# Patient Record
Sex: MALE | Race: WHITE | NOT HISPANIC OR LATINO | Employment: FULL TIME | URBAN - METROPOLITAN AREA
[De-identification: names, ages, dates, MRNs, and addresses within clinical notes are randomized per-mention and may not be internally consistent; named-entity substitution may affect disease eponyms.]

---

## 2017-01-10 ENCOUNTER — OFFICE VISIT (OUTPATIENT)
Dept: ORTHOPEDICS | Facility: CLINIC | Age: 47
End: 2017-01-10
Payer: COMMERCIAL

## 2017-01-10 VITALS
HEIGHT: 71 IN | SYSTOLIC BLOOD PRESSURE: 125 MMHG | BODY MASS INDEX: 35 KG/M2 | WEIGHT: 250 LBS | DIASTOLIC BLOOD PRESSURE: 85 MMHG

## 2017-01-10 DIAGNOSIS — M22.2X9 PATELLOFEMORAL PAIN SYNDROME, UNSPECIFIED LATERALITY: Primary | ICD-10-CM

## 2017-01-10 PROCEDURE — 99203 OFFICE O/P NEW LOW 30 MIN: CPT | Performed by: FAMILY MEDICINE

## 2017-01-10 NOTE — PROGRESS NOTES
"Alen Gibbs  :  1970  DOS: 1/10/2017  MRN: [id number]    Sports Medicine Clinic Visit    PCP: Nito Pruitt    Alen Gibbs is a 46 year old male who is seen as ER referral presenting with right knee pain.    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  Pain improved over last 1 week after resting from work.  Pain located over right medial, anterior kne, nonradiating.  Additional Features:  Positive: swelling.  Symptoms are better with Rest.  Symptoms are worse with: prolonged walking, kneeling.  Other evaluation and/or treatments so far consists of: Ice, Ibuprofen and Rest.  Recent imaging completed: X-rays completed 16.  Prior History of related problems: none    Social History: works as [employer]    Review of Systems  Musculoskeletal: as above  Remainder of review of systems is negative including constitutional, CV, pulmonary, GI, Skin and Neurologic except as noted in HPI or medical history.    Past Medical History   Diagnosis Date     Hyperlipidemia      Syncope and collapse      Past Surgical History   Procedure Laterality Date     Bone marrow aspiration only       Arthrotomy wrist Right 6/10/2016     Procedure: ARTHROTOMY WRIST;  Surgeon: Toni Ewing MD;  Location: [facility] OR       Objective  /85 mmHg  Ht 5' 11\" (1.803 m)  Wt 250 lb (113.399 kg)  BMI 34.88 kg/m2    General: healthy, alert and in no distress    HEENT: no scleral icterus or conjunctival erythema   Skin: no suspicious lesions or rash. No jaundice.   CV: regular rhythm by palpation, 2+ distal pulses, no pedal edema    Resp: normal respiratory effort without conversational dyspnea   Psych: normal mood and affect    Gait: nonantalgic, appropriate coordination and balance   Neuro: normal light touch sensory exam of the extremities. Motor strength as noted below     Right Knee exam    ROM:        Flexion 140 " degrees       Extension 0 degrees       Range of motion limited by minimal pain in terminal flexion    Inspection:       no visible ecchymosis       no effusion noted     Skin:       no visible deformities       well perfused       capillary refill brisk    Patellar Motion:        Crepitus noted in the patellofemoral joint, painless    Tender:        lateral patellar border minimal        medial joint line minimal    Non Tender:         remainder of knee area        medial patellar border        lateral joint line        along MCL        distal IT Band        infrapatellar tendon        tibial tubercle       pes anserine bursa    Special Tests:        neg (-) Chip       neg (-) Lachmans       neg (-) anterior drawer       neg (-) posterior drawer       neg (-) pivot shift       neg (-) varus at 0 deg and 30 deg       neg (-) valgus at 0 deg and 30 deg    Evaluation of ipsilateral kinetic chain       normal strength with hip extension and abduction, but somewhat deconditioned b/l       Decreased strength with single leg squat b/l, R>L        Radiology  RIGHT KNEE ONE TO TWO VIEWS  12/31/2016 9:04 PM       HISTORY:  Two weeks of atraumatic pain.     COMPARISON: None.     FINDINGS: Mild loss of joint space in the medial compartment. Mild  dorsal patellar spurring. No suprapatellar effusion. There is no acute  fracture. No dislocation.  There are no worrisome bony lesions.                                                                       IMPRESSION:  No acute osseous abnormality demonstrated.      Assessment:  1. Patellofemoral pain syndrome, unspecified laterality        Plan:  Discussed the assessment with the patient.  Follow up: 6-8 wks prn  Offered PT, signs of PFS, can order any time prn  cannot r/o very mild degenerative change, but this was a flare due to overuse  Apply HEP to both legs  Discussed how to work on hip and knee stabilization reviewed, training principles reviewed  Use of short 1-2 wk course  of NSAIDs reviewed, dosing and precautions reviewed if utilized  Otherwise try to use tylenol prn  RICE reviewed  Painfree activity ok, limit squatting, lunging, jumping, stairs  We discussed modified progressive pain-free activity as tolerated  Home handouts provided and supportive care reviewed  All questions were answered today  Contact us with additional questions or concerns  Signs and sx of concern reviewed      Asad Godfrey DO, MARYAM  Primary Care Sports Medicine  [facility]           Disclaimer: This note consists of symbols derived from keyboarding, dictation and/or voice recognition software. As a result, there may be errors in the script that have gone undetected. Please consider this when interpreting information found in this chart.

## 2017-01-10 NOTE — NURSING NOTE
"Initial /85 mmHg  Ht 5' 11\" (1.803 m)  Wt 250 lb (113.399 kg)  BMI 34.88 kg/m2 Estimated body mass index is 34.88 kg/(m^2) as calculated from the following:    Height as of this encounter: 5' 11\" (1.803 m).    Weight as of this encounter: 250 lb (113.399 kg). .    Marcelo Payan ATC  "

## 2017-02-05 ENCOUNTER — HOSPITAL ENCOUNTER (EMERGENCY)
Facility: CLINIC | Age: 47
Discharge: HOME | End: 2017-02-05
Attending: NURSE PRACTITIONER | Admitting: NURSE PRACTITIONER
Payer: COMMERCIAL

## 2017-02-05 ENCOUNTER — APPOINTMENT (OUTPATIENT)
Dept: GENERAL RADIOLOGY | Facility: CLINIC | Age: 47
End: 2017-02-05
Attending: NURSE PRACTITIONER
Payer: COMMERCIAL

## 2017-02-05 VITALS — OXYGEN SATURATION: 98 % | SYSTOLIC BLOOD PRESSURE: 136 MMHG | TEMPERATURE: 97.7 F | DIASTOLIC BLOOD PRESSURE: 76 MMHG

## 2017-02-05 DIAGNOSIS — L02.619 CELLULITIS AND ABSCESS OF FOOT EXCLUDING TOE: ICD-10-CM

## 2017-02-05 DIAGNOSIS — L03.119 CELLULITIS AND ABSCESS OF FOOT EXCLUDING TOE: ICD-10-CM

## 2017-02-05 PROCEDURE — 40000940 XR FOOT RT G/E 3 VW: Mod: RT

## 2017-02-05 PROCEDURE — 99213 OFFICE O/P EST LOW 20 MIN: CPT

## 2017-02-05 PROCEDURE — 99214 OFFICE O/P EST MOD 30 MIN: CPT | Performed by: NURSE PRACTITIONER

## 2017-02-05 RX ORDER — CEPHALEXIN 500 MG/1
500 CAPSULE ORAL 2 TIMES DAILY
Qty: 20 CAPSULE | Refills: 0 | Status: SHIPPED | OUTPATIENT
Start: 2017-02-05 | End: 2017-02-07

## 2017-02-05 ASSESSMENT — ENCOUNTER SYMPTOMS
JOINT SWELLING: 1
RESPIRATORY NEGATIVE: 1
WOUND: 0
GASTROINTESTINAL NEGATIVE: 1
ENDOCRINE NEGATIVE: 1
EYES NEGATIVE: 1
NEUROLOGICAL NEGATIVE: 1

## 2017-02-05 NOTE — ED AVS SNAPSHOT
[facility] Emergency Department    [address] [address]    Phone:  [phone]    Fax:  [phone]                                       Alen Gibbs   MRN: [id number]    Department:  [facility] Emergency Department   Date of Visit:  2/5/2017           Patient Information     Date Of Birth          1970        Your diagnoses for this visit were:     Cellulitis and abscess of foot excluding toe        You were seen by Yoshi Delatorre, MACY CNP.      Follow-up Information     Follow up with Nito Pruitt MD.    Specialty:  Family Practice    Why:  As needed, If symptoms worsen    Contact information:    [facility]  [address] [address]  [phone]          Discharge Instructions         Discharge Instructions for Cellulitis  You have been diagnosed with cellulitis. This is an infection in the deepest layer of the skin. In some cases, the infection also affects the muscle. Cellulitis is caused by bacteria. The bacteria can enter the body through broken skin. This can happen with a cut, scratch, animal bite, or an insect bite that has been scratched. You may have been treated in the hospital with antibiotics and fluids. You will likely be given a prescription for antibiotics to take at home. This sheet will help you take care of yourself at home.  Home Care  When you are home:    Take the prescribed antibiotic medication you are given as directed until it is gone. Take it even if you feel better. It treats the infection and stops it from returning. Not taking all of the medication can make future infections hard to treat.    Keep the infected area clean.    When possible, raise the infected area above the level of your heart. This helps keep swelling down.    Talk to your doctor if you are in pain. Ask what kind of over-the-counter medication you can take for pain.    Apply clean bandages as advised.    Take your temperature  once a day for a week.    Wash your hands often to prevent spreading the infection.  In the future, wash your hands before and after you touch cuts, scratches, or bandages. This will help prevent infection.   When to Call Your Doctor  Call your doctor immediately if you have any of the following:    Vomiting    Fever of100.4 F (38 C) or higher, or as directed by your health care provider    Shaking chills    Redness that gets worse in or around the infected area    Swelling of the infected area    Pain that gets worse in or around the infected area    Difficulty or pain when moving the joints above or below the infected area    Discharge or pus draining from the area     9021-0934 [employer]. [address] [place] [address]. All rights reserved. This information is not intended as a substitute for professional medical care. Always follow your healthcare professional's instructions.          24 Hour Appointment Hotline       To make an appointment at any [facility], call [phone] ([phone]). If you don't have a family doctor or clinic, we will help you find one. [facility] clinics are conveniently located to serve the needs of you and your family.             Review of your medicines      START taking        Dose / Directions Last dose taken    cephALEXin 500 MG capsule   Commonly known as:  KEFLEX   Dose:  500 mg   Quantity:  20 capsule        Take 1 capsule (500 mg) by mouth 2 times daily   Refills:  0          Our records show that you are taking the medicines listed below. If these are incorrect, please call your family doctor or clinic.        Dose / Directions Last dose taken    fluticasone 50 MCG/ACT spray   Commonly known as:  FLONASE   Dose:  2 spray   Quantity:  16 g        Spray 2 sprays into both nostrils daily   Refills:  11        hydrOXYzine 25 MG tablet   Commonly known as:  ATARAX   Dose:  25 mg   Quantity:  30 tablet        Take 1 tablet (25 mg) by mouth every  6 hours as needed for itching (and nausea)   Refills:  0        lisinopril 10 MG tablet   Commonly known as:  PRINIVIL/ZESTRIL   Dose:  10 mg   Quantity:  90 tablet        Take 1 tablet (10 mg) by mouth daily   Refills:  3        vitamin D 2000 UNITS tablet   Dose:  1 tablet        Take 1 tablet by mouth daily.   Refills:  0                Prescriptions were sent or printed at these locations (1 Prescription)                   [facility] [place], [place] - [address]   [address] [address]    Telephone:  [phone]   Fax:  [phone]   Hours:                  E-Prescribed (1 of 1)         cephALEXin (KEFLEX) 500 MG capsule                Procedures and tests performed during your visit     Foot  XR, G/E 3 views, right      Orders Needing Specimen Collection     None      Pending Results     Date and Time Order Name Status Description    2/5/2017 1919 Foot  XR, G/E 3 views, right Preliminary             Pending Culture Results     No orders found from 2/4/2017 to 2/6/2017.       Test Results from your hospital stay           2/5/2017  7:39 PM - Interface, Radiant Ib      Narrative     RIGHT FOOT THREE OR MORE VIEWS 2/5/2017 7:29 PM     COMPARISON: None.    HISTORY: Pain.    FINDINGS: There is a plantar calcaneal spur. The visualized bones and  joint spaces are within normal limits.        Impression     IMPRESSION: No evidence for fracture, dislocation or significant  degenerative change of the right foot.                 Thank you for choosing [facility]       Thank you for choosing [facility] for your care. Our goal is always to provide you with excellent care. Hearing back from our patients is one way we can continue to improve our services. Please take a few minutes to complete the written survey that you may receive in the mail after you visit with us. Thank you!        [employer] Information     [employer] lets you send messages to your doctor, view your test results, renew your  "prescriptions, schedule appointments and more. To sign up, go to [email or web address] . Click on \"Log in\" on the left side of the screen, which will take you to the Welcome page. Then click on \"Sign up Now\" on the right side of the page.     You will be asked to enter the access code listed below, as well as some personal information. Please follow the directions to create your username and password.     Your access code is: [email or web address]  Expires: 3/31/2017  9:49 PM     Your access code will  in 90 days. If you need help or a new code, please call your [facility] clinic or [phone].        Care EveryWhere ID     This is your Care EveryWhere ID. This could be used by other organizations to access your [facility] medical records  [id number]        After Visit Summary       This is your record. Keep this with you and show to your community pharmacist(s) and doctor(s) at your next visit.                  "

## 2017-02-05 NOTE — ED AVS SNAPSHOT
[facility] Emergency Department    [address] [address]    Phone:  [phone]    Fax:  [phone]                                       Alen Gibbs   MRN: [id number]    Department:  [facility] Emergency Department   Date of Visit:  2/5/2017           After Visit Summary Signature Page     I have received my discharge instructions, and my questions have been answered. I have discussed any challenges I see with this plan with the nurse or doctor.    ..........................................................................................................................................  Patient/Patient Representative Signature      ..........................................................................................................................................  Patient Representative Print Name and Relationship to Patient    ..................................................               ................................................  Date                                            Time    ..........................................................................................................................................  Reviewed by Signature/Title    ...................................................              ..............................................  Date                                                            Time

## 2017-02-06 NOTE — ED PROVIDER NOTES
History     Chief Complaint   Patient presents with     Foot Pain     x 2 wks      HPI  Alen Gibbs is a 46 year old male who presents with 2 weeks of gradual increase in foot pain in the right foot. He does not recall injuring the foot/ankle but has noticed worsening pain, redness and swelling over the dorsal 5th metacarpal/cuboidal aspect of the foot. He does not recall an insect bite. He does have a history of plantar fasciitis. He denies fever.     I have reviewed the Medications, Allergies, Past Medical and Surgical History, and Social History in the Epic system.    Review of Systems   HENT: Negative.    Eyes: Negative.    Respiratory: Negative.    Gastrointestinal: Negative.    Endocrine: Negative.    Genitourinary: Negative.    Musculoskeletal: Positive for joint swelling (top of foot) and gait problem.   Skin: Negative for rash (mild athletes foot to sole of right foot) and wound.   Neurological: Negative.    All other systems reviewed and are negative.      Physical Exam   BP: 136/76 mmHg  Heart Rate: 110  Temp: 97.7  F (36.5  C)  SpO2: 98 %  Physical Exam   Constitutional: He is oriented to person, place, and time. He appears well-developed and well-nourished. No distress.   Eyes: Conjunctivae and EOM are normal. Right eye exhibits no discharge. Left eye exhibits no discharge.   Pulmonary/Chest: Effort normal.   Musculoskeletal: He exhibits edema (mild edema to dorsal aspect of foot over the 5th metatarsal/3rd cuboid) and tenderness (tender to same area).   Neurological: He is alert and oriented to person, place, and time. No cranial nerve deficit.   Skin: Skin is warm. Rash (mild athletes foot to sole of foot) noted.       ED Course   Procedures             Labs Ordered and Resulted from Time of ED Arrival Up to the Time of Departure from the ED - No data to display    Assessments & Plan (with Medical Decision Making)   Likely cellulitis, possibly gout but this seems less likely    I have reviewed  the nursing notes.    I have reviewed the findings, diagnosis, plan and need for follow up with the patient.    Discharge Medication List as of 2/5/2017  7:49 PM      START taking these medications    Details   cephALEXin (KEFLEX) 500 MG capsule Take 1 capsule (500 mg) by mouth 2 times daily, Disp-20 capsule, R-0, E-Prescribe             Final diagnoses:   Cellulitis and abscess of foot excluding toe       2/5/2017   [facility] EMERGENCY DEPARTMENT      Yoshi Delatorre, MACY CNP  02/07/17 0741

## 2017-02-06 NOTE — DISCHARGE INSTRUCTIONS
Discharge Instructions for Cellulitis  You have been diagnosed with cellulitis. This is an infection in the deepest layer of the skin. In some cases, the infection also affects the muscle. Cellulitis is caused by bacteria. The bacteria can enter the body through broken skin. This can happen with a cut, scratch, animal bite, or an insect bite that has been scratched. You may have been treated in the hospital with antibiotics and fluids. You will likely be given a prescription for antibiotics to take at home. This sheet will help you take care of yourself at home.  Home Care  When you are home:    Take the prescribed antibiotic medication you are given as directed until it is gone. Take it even if you feel better. It treats the infection and stops it from returning. Not taking all of the medication can make future infections hard to treat.    Keep the infected area clean.    When possible, raise the infected area above the level of your heart. This helps keep swelling down.    Talk to your doctor if you are in pain. Ask what kind of over-the-counter medication you can take for pain.    Apply clean bandages as advised.    Take your temperature once a day for a week.    Wash your hands often to prevent spreading the infection.  In the future, wash your hands before and after you touch cuts, scratches, or bandages. This will help prevent infection.   When to Call Your Doctor  Call your doctor immediately if you have any of the following:    Vomiting    Fever of100.4 F (38 C) or higher, or as directed by your health care provider    Shaking chills    Redness that gets worse in or around the infected area    Swelling of the infected area    Pain that gets worse in or around the infected area    Difficulty or pain when moving the joints above or below the infected area    Discharge or pus draining from the area     4645-4826 [employer]. [address] [place] [address], [place] [address]. All rights reserved. This  information is not intended as a substitute for professional medical care. Always follow your healthcare professional's instructions.

## 2017-02-07 ENCOUNTER — OFFICE VISIT (OUTPATIENT)
Dept: FAMILY MEDICINE | Facility: CLINIC | Age: 47
End: 2017-02-07
Payer: COMMERCIAL

## 2017-02-07 VITALS
WEIGHT: 275.6 LBS | HEIGHT: 71 IN | OXYGEN SATURATION: 96 % | DIASTOLIC BLOOD PRESSURE: 83 MMHG | TEMPERATURE: 97.3 F | BODY MASS INDEX: 38.58 KG/M2 | HEART RATE: 98 BPM | SYSTOLIC BLOOD PRESSURE: 128 MMHG

## 2017-02-07 DIAGNOSIS — M10.071 ACUTE IDIOPATHIC GOUT OF RIGHT FOOT: Primary | ICD-10-CM

## 2017-02-07 PROCEDURE — 99214 OFFICE O/P EST MOD 30 MIN: CPT | Performed by: FAMILY MEDICINE

## 2017-02-07 RX ORDER — OXYCODONE AND ACETAMINOPHEN 5; 325 MG/1; MG/1
1-2 TABLET ORAL EVERY 4 HOURS PRN
Qty: 20 TABLET | Refills: 0 | Status: SHIPPED | OUTPATIENT
Start: 2017-02-07 | End: 2017-02-22

## 2017-02-07 RX ORDER — COLCHICINE 0.6 MG/1
TABLET ORAL
Qty: 30 TABLET | Refills: 0 | Status: SHIPPED
Start: 2017-02-07 | End: 2017-05-10

## 2017-02-07 NOTE — PATIENT INSTRUCTIONS
Thank you for choosing [facility].  You may be receiving a survey in the mail from Kieran George regarding your visit today.  Please take a few minutes to complete and return the survey to let us know how we are doing.      If you have questions or concerns, please contact us via [email or web address] or you can contact your care team at [phone].    Our Clinic hours are:  Monday 6:40 am  to 7:00 pm  Tuesday -Friday 6:40 am to 5:00 pm    The [facility] outpatient lab hours are:  Monday - Friday 6:10 am to 4:45 pm  Saturdays 7:00 am to 11:00 am  Appointments are required, call [phone]    If you have clinical questions after hours or would like to schedule an appointment,  call the clinic at [phone].  Gout    Gout or is an inflammation of a joint due to a build-up of gout crystals in the joint fluid. This occurs when there is an excess of uric acid (a normal waste product) in the body. Uric acid builds up in the body when the kidneys are unable to filter enough of it from the blood. This may occur with age. It is also associated with kidney disease. Gout occurs more often in persons with obesity, diabetes, hypertension, or high levels of fats in the blood. It may be run in families. Gout tends to come and go. A flare up of gout is called an attack. Drinking alcohol or eating certain foods (such as shellfish or foods with additives such as high-fructose corn syrup) may increase uric acid levels in the blood and cause a gout attack.  During a gout attack, the affected joint may become a hot, red, swollen and painful. If you have had one attack of gout, you are likely to have another. An attack of gout can be treated with medicine. If these attacks become frequent, a daily medicine may be prescribed to help the kidneys remove uric acid from the body.  Home care  During a gout attack:    Rest painful joints. If gout affects the joints of your foot or leg, you may want to use crutches for the first few days to keep  from bearing weight on the affected joint.    When sitting or lying down, raise the painful joint to a level higher than your heart.    Apply an ice pack (ice cubes in a plastic bag wrapped in a thin towel) over the injured area for 20 minutes every 1-2 hours the first day for pain relief. Continue this 3-4 times a day for swelling and pain.    Avoid alcohol and foods listed below (see Preventing attacks) during a gout attack. Drink extra fluid to help flush the uric acid through your kidneys.    If you were prescribed a medication to treat gout, take it as your healthcare provider has instructed. Don't skip doses.    Take anti-inflammatory medicine as directed.     If pain medicines have been prescribed, take them exactly as directed.    Preventing attacks    Minimize or avoid alcohol use. Excess alcohol intake can cause a gout attack.    Limit these foods and beverages:    Organ meats, such as kidneys and liver    Certain seafoods (anchovies, sardines, shrimp, scallops, herring, mackerel)    Wild game, meat extracts and meat gravies    Foods and beverages sweetened with high-fructose corn syrup, such as sodas    Eat a healthy diet including low-fat and nonfat dairy, whole grains, and vegetables.    If you are overweight, talk to your healthcare provider about a weight reduction plan. Avoid fasting or extreme low calorie diets (less than 900 calories per day). This will increase uric acid levels in the body.    If you have diabetes or high blood pressure, work with your doctor to manage these conditions.    Protect the joint from injury. Trauma can trigger a gout attack.  Follow-up care  Follow up with your healthcare provider or as advised.   When to seek medical advice  Call your healthcare provider if you have any of the following:    Fever over 100.4 F (38. C) with worsening joint pain    Increasing redness around the joint    Pain developing in another joint    Repeated vomiting, abdominal pain, or blood in  the vomit or stool (black or red color)    9919-8521 [employer]. [address] [place] [address], [place] [address]. All rights reserved. This information is not intended as a substitute for professional medical care. Always follow your healthcare professional's instructions.

## 2017-02-07 NOTE — NURSING NOTE
"Chief Complaint   Patient presents with     ER F/U     patient was seen in ER on 2/5/17 with DX of cellulitis in right foot; states pain is getting worse       Initial /83 mmHg  Pulse 98  Temp(Src) 97.3  F (36.3  C) (Tympanic)  Ht 5' 11\" (1.803 m)  Wt 275 lb 9.6 oz (125.011 kg)  BMI 38.46 kg/m2  SpO2 96% Estimated body mass index is 38.46 kg/(m^2) as calculated from the following:    Height as of this encounter: 5' 11\" (1.803 m).    Weight as of this encounter: 275 lb 9.6 oz (125.011 kg).  Medication Reconciliation: complete  "

## 2017-02-07 NOTE — PROGRESS NOTES
"  SUBJECTIVE:                                                    Alen Gibbs is a 46 year old male who presents to clinic today for the following health issues:      ED/UC Followup:    Facility:  [facility]  Date of visit: 2/5/17  Reason for visit: swelling, redness, pain in right foot  Current Status: pain has gotten worse, redness   Started Keflex Sunday for cellulitis and has seen no improvement in the redness or pain.  Not worsening either.  Has history of right foot injury in the past now with degenerative changes.  Ten years ago had an episode of either gout or plantar fascitis and was treated for gout at that time with rapid improvement.  No other acute gout flares.    Problem list and histories reviewed & adjusted, as indicated.  Additional history: as documented    Problem list, Medication list, Allergies, and Medical/Social/Surgical histories reviewed in [facility] and updated as appropriate.    ROS:  C: NEGATIVE for fever, chills, change in weight  CV: NEGATIVE for chest pain, palpitations or peripheral edema  MUSCULOSKELETAL: no other muscle/joint swelling    OBJECTIVE:                                                    /83 mmHg  Pulse 98  Temp(Src) 97.3  F (36.3  C) (Tympanic)  Ht 5' 11\" (1.803 m)  Wt 275 lb 9.6 oz (125.011 kg)  BMI 38.46 kg/m2  SpO2 96%  Body mass index is 38.46 kg/(m^2).  GENERAL: healthy, alert and no distress  MS: right foot most tender and red point is at the base of the 5th metatarsal.  Some pink on the top of the foot and mild swelling there and in toes.  Moderate effusion of the ankle joint, but not red or hot with only mildly decrease Range of motion of ankle.    Diagnostic Test Results:  none      ASSESSMENT/PLAN:                                                        BMI:   Estimated body mass index is 38.46 kg/(m^2) as calculated from the following:    Height as of this encounter: 5' 11\" (1.803 m).    Weight as of this encounter: 275 lb 9.6 oz (125.011 kg). "   Weight management plan: Discussed healthy diet and exercise guidelines and patient will follow up in 12 months in clinic to re-evaluate.      Alen was seen today for er f/u.    Diagnoses and all orders for this visit:    Acute idiopathic gout of right foot: of 5th metatarsal base, significant swelling in the right ankle joint, but not red or hot there.  -Plan to treat for gout with colchicine and pain medication, continue aleve  -ice, rest, discussed hydration and foods to avoid  -ok to stop Keflex as does not seem like cellulitis  -if not improving then see sports medication/ortho for joint aspiration  --discussed risks, benefits, and side effects of this new medication. Patient understands and is in agreement.    -     oxyCODONE-acetaminophen (PERCOCET) 5-325 MG per tablet; Take 1-2 tablets by mouth every 4 hours as needed for moderate to severe pain maximum 6 tablet(s) per day  -     colchicine (COLCRYS) 0.6 MG tablet; 2 tabs at the onset of flare, then 1 tab every 2 hrs until pain is better or diarrhea occurs, up to 10 doses. Wait 3 days until taking again          Axel Rocha MD  [facility]

## 2017-02-07 NOTE — MR AVS SNAPSHOT
After Visit Summary   2/7/2017    Alen Gibbs    MRN: [id number]           Patient Information     Date Of Birth          1970        Visit Information        Provider Department      2/7/2017 9:20 AM Axel Rocha MD [facility]        Today's Diagnoses     Acute idiopathic gout of right foot    -  1       Care Instructions          Thank you for choosing [facility].  You may be receiving a survey in the mail from [facility] regarding your visit today.  Please take a few minutes to complete and return the survey to let us know how we are doing.      If you have questions or concerns, please contact us via i-Ne[employer]os or you can contact your care team at [phone].    Our Clinic hours are:  Monday 6:40 am  to 7:00 pm  Tuesday -Friday 6:40 am to 5:00 pm    The [place] outpatient lab hours are:  Monday - Friday 6:10 am to 4:45 pm  Saturdays 7:00 am to 11:00 am  Appointments are required, call [phone]    If you have clinical questions after hours or would like to schedule an appointment,  call the clinic at [phone].  Gout    Gout or is an inflammation of a joint due to a build-up of gout crystals in the joint fluid. This occurs when there is an excess of uric acid (a normal waste product) in the body. Uric acid builds up in the body when the kidneys are unable to filter enough of it from the blood. This may occur with age. It is also associated with kidney disease. Gout occurs more often in persons with obesity, diabetes, hypertension, or high levels of fats in the blood. It may be run in families. Gout tends to come and go. A flare up of gout is called an attack. Drinking alcohol or eating certain foods (such as shellfish or foods with additives such as high-fructose corn syrup) may increase uric acid levels in the blood and cause a gout attack.  During a gout attack, the affected joint may become a hot, red, swollen and painful. If you have had one attack of  gout, you are likely to have another. An attack of gout can be treated with medicine. If these attacks become frequent, a daily medicine may be prescribed to help the kidneys remove uric acid from the body.  Home care  During a gout attack:    Rest painful joints. If gout affects the joints of your foot or leg, you may want to use crutches for the first few days to keep from bearing weight on the affected joint.    When sitting or lying down, raise the painful joint to a level higher than your heart.    Apply an ice pack (ice cubes in a plastic bag wrapped in a thin towel) over the injured area for 20 minutes every 1-2 hours the first day for pain relief. Continue this 3-4 times a day for swelling and pain.    Avoid alcohol and foods listed below (see Preventing attacks) during a gout attack. Drink extra fluid to help flush the uric acid through your kidneys.    If you were prescribed a medication to treat gout, take it as your healthcare provider has instructed. Don't skip doses.    Take anti-inflammatory medicine as directed.     If pain medicines have been prescribed, take them exactly as directed.    Preventing attacks    Minimize or avoid alcohol use. Excess alcohol intake can cause a gout attack.    Limit these foods and beverages:    Organ meats, such as kidneys and liver    Certain seafoods (anchovies, sardines, shrimp, scallops, herring, mackerel)    Wild game, meat extracts and meat gravies    Foods and beverages sweetened with high-fructose corn syrup, such as sodas    Eat a healthy diet including low-fat and nonfat dairy, whole grains, and vegetables.    If you are overweight, talk to your healthcare provider about a weight reduction plan. Avoid fasting or extreme low calorie diets (less than 900 calories per day). This will increase uric acid levels in the body.    If you have diabetes or high blood pressure, work with your doctor to manage these conditions.    Protect the joint from injury. Trauma can  "trigger a gout attack.  Follow-up care  Follow up with your healthcare provider or as advised.   When to seek medical advice  Call your healthcare provider if you have any of the following:    Fever over 100.4 F (38. C) with worsening joint pain    Increasing redness around the joint    Pain developing in another joint    Repeated vomiting, abdominal pain, or blood in the vomit or stool (black or red color)    1379-6346 [employer]. [address] [place] [address], [place] [address]. All rights reserved. This information is not intended as a substitute for professional medical care. Always follow your healthcare professional's instructions.              Follow-ups after your visit        Who to contact     If you have questions or need follow up information about today's clinic visit or your schedule please contact [facility] directly at [phone].  Normal or non-critical lab and imaging results will be communicated to you by [employer], letter or phone within 4 business days after the clinic has received the results. If you do not hear from us within 7 days, please contact the clinic through [employer] or phone. If you have a critical or abnormal lab result, we will notify you by phone as soon as possible.  Submit refill requests through Box[employer] or call your pharmacy and they will forward the refill request to us. Please allow 3 business days for your refill to be completed.          Additional Information About Your Visit        Boxbe Information     [employer]be lets you send messages to your doctor, view your test results, renew your prescriptions, schedule appointments and more. To sign up, go to [email or web address] . Click on \"Log in\" on the left side of the screen, which will take you to the Welcome page. Then click on \"Sign up Now\" on the right side of the page.     You will be asked to enter the access code listed below, as well as some personal information. Please follow the directions to " "create your username and password.     Your access code is: [email or web address]  Expires: 3/31/2017  9:49 PM     Your access code will  in 90 days. If you need help or a new code, please call your [place] clinic or [phone].        Care EveryWhere ID     This is your Care EveryWhere ID. This could be used by other organizations to access your [place] medical records  [id number]        Your Vitals Were     Pulse Temperature Height BMI (Body Mass Index) Pulse Oximetry       98 97.3  F (36.3  C) (Tympanic) 5' 11\" (1.803 m) 38.46 kg/m2 96%        Blood Pressure from Last 3 Encounters:   17 128/83   17 136/76   01/10/17 125/85    Weight from Last 3 Encounters:   17 275 lb 9.6 oz (125.011 kg)   01/10/17 250 lb (113.399 kg)   06/10/16 264 lb (119.75 kg)              Today, you had the following     No orders found for display         Today's Medication Changes          These changes are accurate as of: 17  9:44 AM.  If you have any questions, ask your nurse or doctor.               Start taking these medicines.        Dose/Directions    colchicine 0.6 MG tablet   Commonly known as:  COLCRYS   Used for:  Acute idiopathic gout of right foot   Started by:  Axel Rocha MD        2 tabs at the onset of flare, then 1 tab every 2 hrs until pain is better or diarrhea occurs, up to 10 doses. Wait 3 days until taking again   Quantity:  30 tablet   Refills:  0       oxyCODONE-acetaminophen 5-325 MG per tablet   Commonly known as:  PERCOCET   Used for:  Acute idiopathic gout of right foot   Started by:  Axel Rocha MD        Dose:  1-2 tablet   Take 1-2 tablets by mouth every 4 hours as needed for moderate to severe pain maximum 6 tablet(s) per day   Quantity:  20 tablet   Refills:  0            Where to get your medicines      These medications were sent to [facility] [address]  [address] [address]     Phone:  [phone]    - " colchicine 0.6 MG tablet      Some of these will need a paper prescription and others can be bought over the counter.  Ask your nurse if you have questions.     Bring a paper prescription for each of these medications    - oxyCODONE-acetaminophen 5-325 MG per tablet             Primary Care Provider Office Phone # Fax #    Nito Pruitt [phone]       [facility] [address] [address]        Thank you!     Thank you for choosing [facility]  for your care. Our goal is always to provide you with excellent care. Hearing back from our patients is one way we can continue to improve our services. Please take a few minutes to complete the written survey that you may receive in the mail after your visit with us. Thank you!             Your Updated Medication List - Protect others around you: Learn how to safely use, store and throw away your medicines at www.disposemymeds.org.          This list is accurate as of: 2/7/17  9:44 AM.  Always use your most recent med list.                   Brand Name Dispense Instructions for use    cephALEXin 500 MG capsule    KEFLEX    20 capsule    Take 1 capsule (500 mg) by mouth 2 times daily       colchicine 0.6 MG tablet    COLCRYS    30 tablet    2 tabs at the onset of flare, then 1 tab every 2 hrs until pain is better or diarrhea occurs, up to 10 doses. Wait 3 days until taking again       lisinopril 10 MG tablet    PRINIVIL/ZESTRIL    90 tablet    Take 1 tablet (10 mg) by mouth daily       oxyCODONE-acetaminophen 5-325 MG per tablet    PERCOCET    20 tablet    Take 1-2 tablets by mouth every 4 hours as needed for moderate to severe pain maximum 6 tablet(s) per day       vitamin D 2000 UNITS tablet      Take 1 tablet by mouth daily.

## 2017-02-22 ENCOUNTER — HOSPITAL ENCOUNTER (EMERGENCY)
Facility: CLINIC | Age: 47
Discharge: HOME | End: 2017-02-22
Attending: PHYSICIAN ASSISTANT | Admitting: PHYSICIAN ASSISTANT
Payer: COMMERCIAL

## 2017-02-22 VITALS — OXYGEN SATURATION: 95 % | DIASTOLIC BLOOD PRESSURE: 87 MMHG | TEMPERATURE: 98.4 F | SYSTOLIC BLOOD PRESSURE: 152 MMHG

## 2017-02-22 DIAGNOSIS — J02.0 STREP THROAT: ICD-10-CM

## 2017-02-22 LAB
INTERNAL QC OK POCT: YES
S PYO AG THROAT QL IA.RAPID: POSITIVE

## 2017-02-22 PROCEDURE — 99214 OFFICE O/P EST MOD 30 MIN: CPT | Performed by: PHYSICIAN ASSISTANT

## 2017-02-22 PROCEDURE — 99213 OFFICE O/P EST LOW 20 MIN: CPT

## 2017-02-22 PROCEDURE — 87880 STREP A ASSAY W/OPTIC: CPT | Performed by: PHYSICIAN ASSISTANT

## 2017-02-22 NOTE — ED AVS SNAPSHOT
[facility] Emergency Department    [address] [address]    Phone:  [phone]    Fax:  [phone]                                       Alen Gibbs   MRN: [id number]    Department:  [facility] Emergency Department   Date of Visit:  2/22/2017           Patient Information     Date Of Birth          1970        Your diagnoses for this visit were:     Strep throat        You were seen by Kaylene Reynoso PA-C.      Follow-up Information     Follow up with Nito Pruitt MD In 3 days.    Specialty:  Family Practice    Why:  if no improvement or sooner if new or worsening symptoms    Contact information:    [facility]  [address] [id number] [phone]          Discharge Instructions         Pharyngitis: Strep (Confirmed)     You have had a positive test for strep throat. Strep throat is a contagious illness. It is spread by coughing, kissing or by touching others after touching your mouth or nose. Symptoms include throat pain which is worse with swallowing, aching all over, headache and fever. It is treated with antibiotic medication. This should help you start to feel better within 1-2 days.  Home care    Rest at home. Drink plenty of fluids to avoid dehydration.    No work or school for the first 2 days of taking the antibiotics. After this time, you will not be contagious. You can then return to school or work if you are feeling better.     The antibiotic medication must be taken for the full 10 days, even if you feel better. This is very important to ensure the infection is treated. It is also important to prevent drug-resistent organisms from developing. If you were given an antibiotic shot, no more antibiotics are needed.    You may use acetaminophen (Tylenol) or ibuprofen (Motrin, Advil) to control pain or fever, unless another medicine was prescribed for this. (NOTE: If you have chronic liver or kidney disease or ever had a  stomach ulcer or GI bleeding, talk with your doctor before using these medicines.)    Throat lozenges or sprays (such as Chloraseptic) help reduce pain. Gargling with warm salt water will also reduce throat pain. Dissolve 1/2 teaspoon of salt in 1 glass of warm water. This may be useful just before meals.     Soft foods are okay. Avoid salty or spicy foods.  Follow-up care  Follow up with your healthcare provider or our staff if you are not improving over the next week.  When to seek medical advice  Call your healthcare provider right away if any of these occur:    Fever as directed by your doctor     New or worsening ear pain, sinus pain, or headache    Painful lumps in the back of neck    Stiff neck    Lymph nodes are getting larger or becoming soft in the middle    Inability to swallow liquids, excessive drooling, or inability to open mouth wide due to throat pain    Signs of dehydration (very dark urine or no urine, sunken eyes, dizziness)    Trouble breathing or noisy breathing    Muffled voice    New rash    2417-5272 [employer]. [address] [place] [address]. All rights reserved. This information is not intended as a substitute for professional medical care. Always follow your healthcare professional's instructions.          24 Hour Appointment Hotline       To make an appointment at any [facility] clinic, call [phone] ([phone]). If you don't have a family doctor or clinic, we will help you find one. [facility] clinics are conveniently located to serve the needs of you and your family.             Review of your medicines      START taking        Dose / Directions Last dose taken    amoxicillin-clavulanate 875-125 MG per tablet   Commonly known as:  AUGMENTIN   Dose:  1 tablet   Quantity:  20 tablet        Take 1 tablet by mouth 2 times daily for 10 days   Refills:  0          Our records show that you are taking the medicines listed below. If these are incorrect, please  call your family doctor or clinic.        Dose / Directions Last dose taken    colchicine 0.6 MG tablet   Commonly known as:  COLCRYS   Quantity:  30 tablet        2 tabs at the onset of flare, then 1 tab every 2 hrs until pain is better or diarrhea occurs, up to 10 doses. Wait 3 days until taking again   Refills:  0        KEFLEX PO        Refills:  0        lisinopril 10 MG tablet   Commonly known as:  PRINIVIL/ZESTRIL   Dose:  10 mg   Quantity:  90 tablet        Take 1 tablet (10 mg) by mouth daily   Refills:  3        vitamin D 2000 UNITS tablet   Dose:  1 tablet        Take 1 tablet by mouth daily.   Refills:  0                Prescriptions were sent or printed at these locations (1 Prescription)                   [facility] [place], [place] - [address]   [address] [address]    Telephone:  [phone]   Fax:  [phone]   Hours:                  E-Prescribed (1 of 1)         amoxicillin-clavulanate (AUGMENTIN) 875-125 MG per tablet                Procedures and tests performed during your visit     Rapid strep group A screen POCT      Orders Needing Specimen Collection     None      Pending Results     No orders found from 2/20/2017 to 2/23/2017.            Pending Culture Results     No orders found from 2/20/2017 to 2/23/2017.             Test Results from your hospital stay     2/22/2017  6:49 PM - Jennifer Boudreaux LPN      Component Results     Component Value Ref Range & Units Status    Rapid Strep A Screen POSITIVE neg Final    Internal QC OK Yes  Final                Thank you for choosing [facility]       Thank you for choosing [facility] for your care. Our goal is always to provide you with excellent care. Hearing back from our patients is one way we can continue to improve our services. Please take a few minutes to complete the written survey that you may receive in the mail after you visit with us. Thank you!        [employer] Information     [employer] lets you send  "messages to your doctor, view your test results, renew your prescriptions, schedule appointments and more. To sign up, go to [email or web address] . Click on \"Log in\" on the left side of the screen, which will take you to the Welcome page. Then click on \"Sign up Now\" on the right side of the page.     You will be asked to enter the access code listed below, as well as some personal information. Please follow the directions to create your username and password.     Your access code is: [email or web address]  Expires: 3/31/2017  9:49 PM     Your access code will  in 90 days. If you need help or a new code, please call your [place] clinic or [phone].        Care EveryWhere ID     This is your Care EveryWhere ID. This could be used by other organizations to access your [facility] medical records  [id number]        After Visit Summary       This is your record. Keep this with you and show to your community pharmacist(s) and doctor(s) at your next visit.                  "

## 2017-02-22 NOTE — ED AVS SNAPSHOT
[facility] Emergency Department    [address] [address]    Phone:  [phone]    Fax:  [phone]                                       Alen Gibbs   MRN: [id number]    Department:  [facility] Emergency Department   Date of Visit:  2/22/2017           After Visit Summary Signature Page     I have received my discharge instructions, and my questions have been answered. I have discussed any challenges I see with this plan with the nurse or doctor.    ..........................................................................................................................................  Patient/Patient Representative Signature      ..........................................................................................................................................  Patient Representative Print Name and Relationship to Patient    ..................................................               ................................................  Date                                            Time    ..........................................................................................................................................  Reviewed by Signature/Title    ...................................................              ..............................................  Date                                                            Time

## 2017-02-22 NOTE — LETTER
[facility] EMERGENCY DEPARTMENT  [address] [address]  Phone: [phone]  Fax: [phone]    February 22, 2017        Alen Gibbs  [address] [facility] [address]          To whom it may concern:    RE: Alensrini Guzmanr    Mr. Gibbs was evaluated in the  for an illness 2/22/17.  He is contagious for the next 24 hours and should not participate in activity during that time period.      Please contact me for questions or concerns.      Sincerely,      Kaylene Reynoso PA-C

## 2017-02-23 NOTE — ED PROVIDER NOTES
History     Chief Complaint   Patient presents with     Pharyngitis     sore throat for several days and getting worse, pt on keflex for diff issue.     HPI  Alen Gibbs is a 46 year old male with a chief complaint of sore throat for the last 8 days.  He usually complains of nasal congestion, sinus pressure, cough, shortness of breath and wheezing.  He has also had chills, myalgias.  He denies any objective fever, nausea, vomiting, diarrhea or abdominal pain.  Incidentally he states he was on 24-hour history of Keflex last week due to concerns over possible foot infection, however his medication was discontinued after 24 hours when he was diagnosed with gout by another provider.  He has been attempting to treat with Mucinex D without significant improvement.  He denies any prior history of asthma, COPD, other bleeding related disorders however states he has been diagnosed with pneumonia previously.     Past Medical History   Diagnosis Date     Hyperlipidemia      Syncope and collapse      Current Outpatient Prescriptions   Medication Sig Dispense Refill     Cephalexin (KEFLEX PO)        colchicine (COLCRYS) 0.6 MG tablet 2 tabs at the onset of flare, then 1 tab every 2 hrs until pain is better or diarrhea occurs, up to 10 doses. Wait 3 days until taking again 30 tablet 0     lisinopril (PRINIVIL,ZESTRIL) 10 MG tablet Take 1 tablet (10 mg) by mouth daily 90 tablet 3     Cholecalciferol (VITAMIN D) 2000 UNIT tablet Take 1 tablet by mouth daily.       Social History   Substance Use Topics     Smoking status: Never Smoker     Smokeless tobacco: Never Used     Alcohol use Yes      Comment: occ     I have reviewed the Medications, Allergies, Past Medical and Surgical History, and Social History in the Epic system.    Review of Systems  CONSTITUTIONAL:POSITIVE  for chills and myalgias and NEGATIVE  for objective fever  INTEGUMENTARY/SKIN: NEGATIVE for worrisome rashes, moles or lesions  EYES: NEGATIVE for vision  changes or irritation  ENT/MOUTH: POSITIVE for sore throat, nasal congestion and sinus pressure NEGATIVE for ear pain   RESP:POSITIVE for cough, shortness of breath and wheezing  GI: NEGATIVE for abdominal pain, diarrhea, nausea and vomiting  Physical Exam   /87  Temp 98.4  F (36.9  C) (Oral)  SpO2 95%  Physical Exam  GENERAL APPEARANCE: healthy, alert and no distress  EYES: EOMI,  PERRL, conjunctiva clear  HENT: ear canals and TM's normal.  Nasal mucosa edema.  There is some dried blood present in the left nares.  Pharynx erythematous, edematous without exudate, uvula midline.    NECK: supple bilateral tender tonsillar lymphadenopathy, left greater than right  RESP: He has crackles present at both lung bases.  No wheezing.  CV: tachycardia, regular rhythm, normal S1 S2, no murmur noted  SKIN: no suspicious lesions or rashes  ED Course     ED Course     Procedures        Critical Care time:  none            Results for orders placed or performed during the hospital encounter of 02/22/17   Rapid strep group A screen POCT   Result Value Ref Range    Rapid Strep A Screen POSITIVE neg    Internal QC OK Yes      Assessments & Plan (with Medical Decision Making)     I have reviewed the nursing notes.    I have reviewed the findings, diagnosis, plan and need for follow up with the patient.  Discharge Medication List as of 2/22/2017  7:05 PM      START taking these medications    Details   amoxicillin-clavulanate (AUGMENTIN) 875-125 MG per tablet Take 1 tablet by mouth 2 times daily for 10 days, Disp-20 tablet, R-0, E-Prescribe           Final diagnoses:   Strep throat     RST positive.  Patient will be initiated on antibiotics.  Given presence of nasal congestion and cough there is also likely overlapping viral infection.  I have low suspicion for pneumonia and as patient will be placed on antibiotics for alternate diagnosis will defer chest X-ray at this time.  Differential versus symptoms also include allergic  rhinitis, bacterial sinusitis.  There was no evidence of peritonsillar or retropharyngeal abscess.  He was instructed to  Follow up with PCP if no improvement in three days.  Worrisome reasons to seek care sooner discussed.      Disclaimer: This note consists of symbols derived from keyboarding, dictation, and/or voice recognition software. As a result, there may be errors in the script that have gone undetected.  Please consider this when interpreting information found in the chart.    2/22/2017   [facility] EMERGENCY DEPARTMENT     Kaylene Reynoso PA-C  02/22/17 [id number]

## 2017-02-23 NOTE — DISCHARGE INSTRUCTIONS
Pharyngitis: Strep (Confirmed)     You have had a positive test for strep throat. Strep throat is a contagious illness. It is spread by coughing, kissing or by touching others after touching your mouth or nose. Symptoms include throat pain which is worse with swallowing, aching all over, headache and fever. It is treated with antibiotic medication. This should help you start to feel better within 1-2 days.  Home care    Rest at home. Drink plenty of fluids to avoid dehydration.    No work or school for the first 2 days of taking the antibiotics. After this time, you will not be contagious. You can then return to school or work if you are feeling better.     The antibiotic medication must be taken for the full 10 days, even if you feel better. This is very important to ensure the infection is treated. It is also important to prevent drug-resistent organisms from developing. If you were given an antibiotic shot, no more antibiotics are needed.    You may use acetaminophen (Tylenol) or ibuprofen (Motrin, Advil) to control pain or fever, unless another medicine was prescribed for this. (NOTE: If you have chronic liver or kidney disease or ever had a stomach ulcer or GI bleeding, talk with your doctor before using these medicines.)    Throat lozenges or sprays (such as Chloraseptic) help reduce pain. Gargling with warm salt water will also reduce throat pain. Dissolve 1/2 teaspoon of salt in 1 glass of warm water. This may be useful just before meals.     Soft foods are okay. Avoid salty or spicy foods.  Follow-up care  Follow up with your healthcare provider or our staff if you are not improving over the next week.  When to seek medical advice  Call your healthcare provider right away if any of these occur:    Fever as directed by your doctor     New or worsening ear pain, sinus pain, or headache    Painful lumps in the back of neck    Stiff neck    Lymph nodes are getting larger or becoming soft in the  middle    Inability to swallow liquids, excessive drooling, or inability to open mouth wide due to throat pain    Signs of dehydration (very dark urine or no urine, sunken eyes, dizziness)    Trouble breathing or noisy breathing    Muffled voice    New rash    0366-7736 [employer]. [address] [place] [address]. All rights reserved. This information is not intended as a substitute for professional medical care. Always follow your healthcare professional's instructions.

## 2017-02-24 ENCOUNTER — TELEPHONE (OUTPATIENT)
Dept: NURSING | Facility: CLINIC | Age: 47
End: 2017-02-24

## 2017-02-25 ENCOUNTER — HOSPITAL ENCOUNTER (EMERGENCY)
Facility: CLINIC | Age: 47
Discharge: HOME | End: 2017-02-25
Attending: NURSE PRACTITIONER | Admitting: NURSE PRACTITIONER
Payer: COMMERCIAL

## 2017-02-25 VITALS
OXYGEN SATURATION: 98 % | DIASTOLIC BLOOD PRESSURE: 98 MMHG | HEART RATE: 90 BPM | SYSTOLIC BLOOD PRESSURE: 170 MMHG | RESPIRATION RATE: 20 BRPM | TEMPERATURE: 98.4 F

## 2017-02-25 DIAGNOSIS — M10.072 ACUTE IDIOPATHIC GOUT OF LEFT ANKLE: ICD-10-CM

## 2017-02-25 PROCEDURE — 99213 OFFICE O/P EST LOW 20 MIN: CPT

## 2017-02-25 PROCEDURE — 99213 OFFICE O/P EST LOW 20 MIN: CPT | Performed by: NURSE PRACTITIONER

## 2017-02-25 RX ORDER — INDOMETHACIN 50 MG/1
50 CAPSULE ORAL
Qty: 30 CAPSULE | Refills: 0 | Status: SHIPPED
Start: 2017-02-25 | End: 2017-03-08

## 2017-02-25 RX ORDER — OXYCODONE AND ACETAMINOPHEN 5; 325 MG/1; MG/1
1-2 TABLET ORAL EVERY 4 HOURS PRN
Qty: 20 TABLET | Refills: 0 | Status: SHIPPED | OUTPATIENT
Start: 2017-02-25 | End: 2017-05-10

## 2017-02-25 ASSESSMENT — ENCOUNTER SYMPTOMS
NEUROLOGICAL NEGATIVE: 1
CHILLS: 0
CARDIOVASCULAR NEGATIVE: 1
FEVER: 0
RESPIRATORY NEGATIVE: 1
ACTIVITY CHANGE: 0

## 2017-02-25 NOTE — ED PROVIDER NOTES
History     Chief Complaint   Patient presents with     Ankle Pain     possible gout, left ankle/foot     HPI  Alen Gibbs is a 46 year old male with history of hypertension who presents to urgent care for evaluation of acute onset of left ankle and foot pain.  Describes pain as severe and even light touch is unbearable.  Previous history of gout in right foot 2 weeks ago that he received immediate relief after starting colchicine.  Incidentally he is currently being treated for strep pharyngitis with Augmentin.      Patient Active Problem List   Diagnosis     CARDIOVASCULAR SCREENING; LDL GOAL LESS THAN 130     Tinea versicolor     Essential hypertension with goal blood pressure less than 130/80       No current facility-administered medications on file prior to encounter.   Current Outpatient Prescriptions on File Prior to Encounter:  amoxicillin-clavulanate (AUGMENTIN) 875-125 MG per tablet Take 1 tablet by mouth 2 times daily for 10 days   colchicine (COLCRYS) 0.6 MG tablet 2 tabs at the onset of flare, then 1 tab every 2 hrs until pain is better or diarrhea occurs, up to 10 doses. Wait 3 days until taking again   lisinopril (PRINIVIL,ZESTRIL) 10 MG tablet Take 1 tablet (10 mg) by mouth daily   Cholecalciferol (VITAMIN D) 2000 UNIT tablet Take 1 tablet by mouth daily.         I have reviewed the Medications, Allergies, Past Medical and Surgical History, and Social History in the Epic system.    Review of Systems   Constitutional: Negative for activity change, chills and fever.   HENT: Negative.    Respiratory: Negative.    Cardiovascular: Negative.    Genitourinary: Negative.    Musculoskeletal:        Painful to weightbear on left foot. Pain with light touching of his left foot. Left ankle swelling.   Neurological: Negative.        Physical Exam   BP: (!) 170/98  Pulse: 90  Temp: 98.4  F (36.9  C)  Resp: 20  SpO2: 98 %  Physical Exam   Constitutional: He appears well-developed and well-nourished. He  appears distressed.   Cardiovascular: Normal rate, regular rhythm and normal heart sounds.    Musculoskeletal:        Left ankle: He exhibits swelling. Tenderness. Lateral malleolus tenderness found.        Left foot: There is tenderness.        Feet:        ED Course     ED Course     Procedures             Labs Ordered and Resulted from Time of ED Arrival Up to the Time of Departure from the ED - No data to display    Assessments & Plan (with Medical Decision Making)   Alen Gibbs is a 46 year old male with history of hypertension who presents to urgent care for evaluation of acute onset of left ankle and foot pain.  Describes pain as severe and even light touch is unbearable.  Previous history of gout in right foot 2 weeks ago that he received immediate relief after starting colchicine.  Incidentally he is currently being treated for strep pharyngitis with Augmentin. VSS. Afebrile. History and exam findings are consistent with gouty attack.  Low concern for septic joint given he is feeling well (improved since starting treatment for strep), appears well, and afebrile.  We discussed colchicine vs indomethacin and I would recommend indomethacin since it is recommended as first line treatment over colchicine. Colchicine worked for him but it is of concern that he had another attack in less than 2 weeks in another joint. I also discussed interactions with indomethacin and ACE inhibitors with the pharmacy.  It is reasonable to take this short term and he BP has been stable other than today when he is having severe pain. He is consistent in taking his BP medication.   Patient was sent with a prescription for indomethacin and Percocet.  He was instructed to follow-up with his primary care provider to discuss recommendations for starting allopurinol after this episode, given this is his 2nd attack in less than a year.  Patient is agreeable to the plan.    I have reviewed the nursing notes.    I have reviewed the  findings, diagnosis, plan and need for follow up with the patient.    Discharge Medication List as of 2/25/2017  5:08 PM      START taking these medications    Details   indomethacin (INDOCIN) 50 MG capsule Take 1 capsule (50 mg) by mouth 3 times daily (with meals) for 10 days Start at 50mg three times a day for 3 days, then decrease to 25 mg three times a day for 7 day., Disp-30 capsule, R-0, Fax      oxyCODONE-acetaminophen (PERCOCET) 5-325 MG per tablet Take 1-2 tablets by mouth every 4 hours as needed for moderate to severe pain, Disp-20 tablet, R-0, Local Print             Final diagnoses:   Acute idiopathic gout of left ankle       2/25/2017   [facility] EMERGENCY DEPARTMENT     Claudia, MACY Quispe CNP  02/25/17 8861

## 2017-02-25 NOTE — DISCHARGE INSTRUCTIONS
Treating Gout Attacks  Gout is a disease that affects the joints. Left untreated, it can lead to painful foot deformity and even kidney problems. But, by treating gout early, you can relieve pain and help prevent future problems. Gout can usually be treated with medication and proper diet. In severe cases, surgery may be needed.  Gout attacks are painful and often happen more than once. Taking medications may reduce pain and prevent attacks in the future. There are also some things you can do at home to relieve symptoms.    Medications for Gout  Your health care provider may prescribe a daily medication to reduce levels of uric acid. This may help prevent gout attacks. Other medications can help relieve pain and swelling during an attack. Be sure to take your medication as directed.  What You Can Do  Below are some things you can do at home to relieve gout symptoms. Your health care provider may have other tips.    Rest the painful joint as much as you can.    Raise the painful joint so it is at a level higher than your heart.  How Can I Prevent Gout?  With a little effort, you may be able to prevent gout attacks in the future. Here are some things you can do:    Avoid alcohol and foods that trigger gout.    Avoid foods high in purines    Certain meats (red meat, processed meat, turkey)    Organ meats (kidney, liver, sweetbread)    Shellfish (lobster, crab, shrimp, scallop, mussel)    Certain fish (anchovy, sardine, herring, mackerel)    Take any medications prescribed by your health care provider.    Lose weight if you need to.    Control blood pressure and cholesterol.    Drink plenty of water to help flush uric acid from your body.    1181-6572 [employer]. [address] [place] [address], [place] [address]. All rights reserved. This information is not intended as a substitute for professional medical care. Always follow your healthcare professional's instructions.

## 2017-02-25 NOTE — TELEPHONE ENCOUNTER
Call Type: Triage Call    Presenting Problem: On 2/5/17 pt was seen in ED for cellulitis and  abcess of his right foot.  He was then seen in clinic and instead of  the celllulits was diagnosed with gout in the right foot.  Then on  2/22/17 he was diagnosed with strep throat and today his left foot  became sore similar to what happened on 2/5/17 to his right foot, he  tried the colcrys but it is not helping with the pain.  Pt does not  want to go to ER this evening states he will wait until tomorrow and  go to [facility].  Triage Note:  Guideline Title: Foot Non-Injury  Recommended Disposition: See ED Immediately  Original Inclination: Would have called clinic  Override Disposition:  Intended Action: Patient does not know  Physician Contacted: No  New onset of unbearable pain within last 24 hours ?  YES  Gradual onset or worsening numbness/tingling ? NO  New onset of severe pain AND change in sensation (numb, tingling, or no feeling),  change in color (pale or blue), feels cool to touch compared to other extremity.  ? NO  Any foot problems AND has diabetes ? NO  New unexplained weakness/paralysis, change in sensation (numbness or tingling) or  inability to purposely move, especially when one side of body is involved  occurring now or within last 4 hours ? NO  Physician Instructions:  Care Advice: Protect the patient from falling or other harm.  Avoid moving affected part.  Protect from any injury.  DO NOT attempt to place any weight on the affected extremity until  evaluated by provider.  Write down provider's name. List or place the following in a bag for  transport with the patient: current prescription and/or nonprescription  medications  alternative treatments, therapies and medications  and street drugs.

## 2017-02-25 NOTE — ED AVS SNAPSHOT
[facility] Emergency Department    [address] [address]    Phone:  [phone]    Fax:  [phone]                                       Alen Gibbs   MRN: [id number]    Department:  [facility] Emergency Department   Date of Visit:  2/25/2017           Patient Information     Date Of Birth          1970        Your diagnoses for this visit were:     Acute idiopathic gout of left ankle        You were seen by Melanie Taylor APRN CNP.      Follow-up Information     Follow up with Nito Pruitt MD In 1 week.    Specialty:  Family Practice    Contact information:    [facility]  [address] [place] [id number] [phone]          Discharge Instructions         Treating Gout Attacks  Gout is a disease that affects the joints. Left untreated, it can lead to painful foot deformity and even kidney problems. But, by treating gout early, you can relieve pain and help prevent future problems. Gout can usually be treated with medication and proper diet. In severe cases, surgery may be needed.  Gout attacks are painful and often happen more than once. Taking medications may reduce pain and prevent attacks in the future. There are also some things you can do at home to relieve symptoms.    Medications for Gout  Your health care provider may prescribe a daily medication to reduce levels of uric acid. This may help prevent gout attacks. Other medications can help relieve pain and swelling during an attack. Be sure to take your medication as directed.  What You Can Do  Below are some things you can do at home to relieve gout symptoms. Your health care provider may have other tips.    Rest the painful joint as much as you can.    Raise the painful joint so it is at a level higher than your heart.  How Can I Prevent Gout?  With a little effort, you may be able to prevent gout attacks in the future. Here are some things you can do:    Avoid alcohol and  foods that trigger gout.    Avoid foods high in purines    Certain meats (red meat, processed meat, turkey)    Organ meats (kidney, liver, sweetbread)    Shellfish (lobster, crab, shrimp, scallop, mussel)    Certain fish (anchovy, sardine, herring, mackerel)    Take any medications prescribed by your health care provider.    Lose weight if you need to.    Control blood pressure and cholesterol.    Drink plenty of water to help flush uric acid from your body.    7690-2313 [employer]. [address] [place] [address]. All rights reserved. This information is not intended as a substitute for professional medical care. Always follow your healthcare professional's instructions.          24 Hour Appointment Hotline       To make an appointment at any [facility], call [phone] ([phone]). If you don't have a family doctor or clinic, we will help you find one. [facility] clinics are conveniently located to serve the needs of you and your family.             Review of your medicines      START taking        Dose / Directions Last dose taken    indomethacin 50 MG capsule   Commonly known as:  INDOCIN   Dose:  50 mg   Quantity:  30 capsule        Take 1 capsule (50 mg) by mouth 3 times daily (with meals) for 10 days Start at 50mg three times a day for 3 days, then decrease to 25 mg three times a day for 7 day.   Refills:  0        oxyCODONE-acetaminophen 5-325 MG per tablet   Commonly known as:  PERCOCET   Dose:  1-2 tablet   Quantity:  20 tablet        Take 1-2 tablets by mouth every 4 hours as needed for moderate to severe pain   Refills:  0          Our records show that you are taking the medicines listed below. If these are incorrect, please call your family doctor or clinic.        Dose / Directions Last dose taken    amoxicillin-clavulanate 875-125 MG per tablet   Commonly known as:  AUGMENTIN   Dose:  1 tablet   Quantity:  20 tablet        Take 1 tablet by mouth 2 times daily for  "10 days   Refills:  0        colchicine 0.6 MG tablet   Commonly known as:  COLCRYS   Quantity:  30 tablet        2 tabs at the onset of flare, then 1 tab every 2 hrs until pain is better or diarrhea occurs, up to 10 doses. Wait 3 days until taking again   Refills:  0        lisinopril 10 MG tablet   Commonly known as:  PRINIVIL/ZESTRIL   Dose:  10 mg   Quantity:  90 tablet        Take 1 tablet (10 mg) by mouth daily   Refills:  3        vitamin D 2000 UNITS tablet   Dose:  1 tablet        Take 1 tablet by mouth daily.   Refills:  0                Prescriptions were sent or printed at these locations (2 Prescriptions)                   [facility] [place], [place] - [address]   [address] [address]    Telephone:  [phone]   Fax:  [phone]   Hours:                  Faxed (1 of 2)         indomethacin (INDOCIN) 50 MG capsule                 Printed at Department/Unit printer (1 of 2)         oxyCODONE-acetaminophen (PERCOCET) 5-325 MG per tablet                Orders Needing Specimen Collection     None      Pending Results     No orders found from 2/23/2017 to 2/26/2017.            Pending Culture Results     No orders found from 2/23/2017 to 2/26/2017.             Test Results from your hospital stay            Thank you for choosing [facility]       Thank you for choosing [facility] for your care. Our goal is always to provide you with excellent care. Hearing back from our patients is one way we can continue to improve our services. Please take a few minutes to complete the written survey that you may receive in the mail after you visit with us. Thank you!        [employer] Information     [employer] lets you send messages to your doctor, view your test results, renew your prescriptions, schedule appointments and more. To sign up, go to [email or web address] Click on \"Log in\" on the left side of the screen, which will take you to the Welcome page. Then click on \"Sign up Now\" " on the right side of the page.     You will be asked to enter the access code listed below, as well as some personal information. Please follow the directions to create your username and password.     Your access code is: [email or web address]  Expires: 3/31/2017  9:49 PM     Your access code will  in 90 days. If you need help or a new code, please call your [facility] clinic or [phone].        Care EveryWhere ID     This is your Care EveryWhere ID. This could be used by other organizations to access your [facility] medical records  [id number]        After Visit Summary       This is your record. Keep this with you and show to your community pharmacist(s) and doctor(s) at your next visit.

## 2017-02-25 NOTE — ED AVS SNAPSHOT
[facility] Emergency Department    [address] [address]    Phone:  [phone]    Fax:  [phone]                                       Alen Gibbs   MRN: [id number]    Department:  [facility] Emergency Department   Date of Visit:  2/25/2017           After Visit Summary Signature Page     I have received my discharge instructions, and my questions have been answered. I have discussed any challenges I see with this plan with the nurse or doctor.    ..........................................................................................................................................  Patient/Patient Representative Signature      ..........................................................................................................................................  Patient Representative Print Name and Relationship to Patient    ..................................................               ................................................  Date                                            Time    ..........................................................................................................................................  Reviewed by Signature/Title    ...................................................              ..............................................  Date                                                            Time

## 2017-03-08 ENCOUNTER — TELEPHONE (OUTPATIENT)
Dept: FAMILY MEDICINE | Facility: CLINIC | Age: 47
End: 2017-03-08

## 2017-03-08 DIAGNOSIS — M10.9 ACUTE GOUTY ARTHRITIS: Primary | ICD-10-CM

## 2017-03-08 RX ORDER — INDOMETHACIN 50 MG/1
50 CAPSULE ORAL
Qty: 30 CAPSULE | Refills: 0 | Status: SHIPPED
Start: 2017-03-08 | End: 2017-05-10

## 2017-03-08 NOTE — TELEPHONE ENCOUNTER
Please notify prescription sent to pharmacy for the Indocin.   He should not start Allopurinol until several weeks after the gout symptoms are resolved.   He should follow up in clinic to discuss the use of these medications, and I can give him refills.   Nito Pruitt

## 2017-03-08 NOTE — TELEPHONE ENCOUNTER
"Notified him , \"ok, thanks, \"  He understands the indocin is at the pharmacy and he is to be seen to discuss further meds for prevention and/ or refills.   Shawanda Land RNC    "

## 2017-03-08 NOTE — TELEPHONE ENCOUNTER
Reason for call:  Patient reporting a symptom    Symptom or request: Pt was treated for gout, by Dr. Rocha, a few weeks ago and the pain got better.  However, recently it is coming back again.  Pt is wanting a a refill of the gout med and possibly be put on Allopurinol daily to prevent future flare-ups - [facility]    Duration (how long have symptoms been present): ongoing    Have you been treated for this before? Yes    Additional comments:     Phone Number patient can be reached at:  Home number on file [phone] (home)    Best Time:  any    Can we leave a detailed message on this number:  YES    Call taken on 3/8/2017 at 10:28 AM by Denia Walter

## 2017-03-08 NOTE — TELEPHONE ENCOUNTER
Dr Rocha is not in today.     Per Urgent care note 2/25/17:   Assessments & Plan (with Medical Decision Making)   Alen Gibbs is a 46 year old male with history of hypertension who presents to urgent care for evaluation of acute onset of left ankle and foot pain. Describes pain as severe and even light touch is unbearable. Previous history of gout in right foot 2 weeks ago that he received immediate relief after starting colchicine. Incidentally he is currently being treated for strep pharyngitis with Augmentin. VSS. Afebrile. History and exam findings are consistent with gouty attack. Low concern for septic joint given he is feeling well (improved since starting treatment for strep), appears well, and afebrile. We discussed colchicine vs indomethacin and I would recommend indomethacin since it is recommended as first line treatment over colchicine. Colchicine worked for him but it is of concern that he had another attack in less than 2 weeks in another joint. I also discussed interactions with indomethacin and ACE inhibitors with the pharmacy. It is reasonable to take this short term and he BP has been stable other than today when he is having severe pain. He is consistent in taking his BP medication. Patient was sent with a prescription for indomethacin and Percocet.He was instructed to follow-up with his primary care provider to discuss recommendations for starting allopurinol after this episode, given this is his 2nd attack in less than a year. Patient is agreeable to the plan.    Patient now calling with gout flare again,   What to recommend?   Shawanda Land RNC

## 2017-05-10 ENCOUNTER — OFFICE VISIT (OUTPATIENT)
Dept: FAMILY MEDICINE | Facility: CLINIC | Age: 47
End: 2017-05-10
Payer: COMMERCIAL

## 2017-05-10 VITALS
TEMPERATURE: 98.3 F | HEART RATE: 71 BPM | WEIGHT: 270 LBS | SYSTOLIC BLOOD PRESSURE: 122 MMHG | BODY MASS INDEX: 37.8 KG/M2 | HEIGHT: 71 IN | DIASTOLIC BLOOD PRESSURE: 74 MMHG

## 2017-05-10 DIAGNOSIS — M10.9 ACUTE GOUTY ARTHRITIS: Primary | ICD-10-CM

## 2017-05-10 LAB — URATE SERPL-MCNC: 8 MG/DL (ref 3.5–7.2)

## 2017-05-10 PROCEDURE — 99213 OFFICE O/P EST LOW 20 MIN: CPT | Performed by: FAMILY MEDICINE

## 2017-05-10 PROCEDURE — 84550 ASSAY OF BLOOD/URIC ACID: CPT | Performed by: FAMILY MEDICINE

## 2017-05-10 PROCEDURE — 36415 COLL VENOUS BLD VENIPUNCTURE: CPT | Performed by: FAMILY MEDICINE

## 2017-05-10 RX ORDER — ALLOPURINOL 300 MG/1
300 TABLET ORAL DAILY
Qty: 30 TABLET | Refills: 11 | Status: SHIPPED | OUTPATIENT
Start: 2017-05-10 | End: 2018-07-10

## 2017-05-10 RX ORDER — INDOMETHACIN 50 MG/1
50 CAPSULE ORAL
Qty: 42 CAPSULE | Refills: 5 | Status: SHIPPED | OUTPATIENT
Start: 2017-05-10 | End: 2017-12-18 | Stop reason: ALTCHOICE

## 2017-05-10 NOTE — MR AVS SNAPSHOT
After Visit Summary   5/10/2017    Alen Gibbs    MRN: [id number]           Patient Information     Date Of Birth          1970        Visit Information        Provider Department      5/10/2017 11:20 AM Nito Pruitt MD [facility]        Today's Diagnoses     Acute gouty arthritis    -  1      Care Instructions          Thank you for choosing [facility].  You may be receiving a survey in the mail from Kier[employer] Dudle[employer]avia regarding your visit today.  Please take a few minutes to complete and return the survey to let us know how we are doing.      If you have questions or concerns, please contact us via [employer] or you can contact your care team at [phone].    Our Clinic hours are:  Monday 6:40 am  to 7:00 pm  Tuesday -Friday 6:40 am to 5:00 pm    The [place] outpatient lab hours are:  Monday - Friday 6:10 am to 4:45 pm  Saturdays 7:00 am to 11:00 am  Appointments are required, call [phone]    If you have clinical questions after hours or would like to schedule an appointment,  call the clinic at [phone].      (M10.9) Acute gouty arthritis  (primary encounter diagnosis)  Comment:   Plan: Uric acid, allopurinol (ZYLOPRIM) 300 MG         tablet, indomethacin (INDOCIN) 50 MG capsule        We discussed the factors including the lower protein diet, at 2 ounces daily. Stay well hydrated and the urine should be dilute.   Do the lab today for the uric and annually. The last in 2010 was 9.1, very high. The lower the better and 3.0 is very low.   For the inflammation use Indomethacin at 50 mg with non spicy food, three times daily til better. When better you may switch to Advil.   Take only one NSAID at a time. Ice may be used but avoid heat. 3-4 weeks after all the symptoms are gone, then start Allopurinol to prevent the   Production of the uric acid in the body. This will be a lifelong medication. Recheck in the clinic annually if doing well.         Follow-ups after  "your visit        Who to contact     If you have questions or need follow up information about today's clinic visit or your schedule please contact [facility] directly at [phone].  Normal or non-critical lab and imaging results will be communicated to you by MyChart, letter or phone within 4 business days after the clinic has received the results. If you do not hear from us within 7 days, please contact the clinic through MyChart or phone. If you have a critical or abnormal lab result, we will notify you by phone as soon as possible.  Submit refill requests through [employer] or call your pharmacy and they will forward the refill request to us. Please allow 3 business days for your refill to be completed.          Additional Information About Your Visit        [facility] Information     Pick1 lets you send messages to your doctor, view your test results, renew your prescriptions, schedule appointments and more. To sign up, go to [email or web address] . Click on \"Log in\" on the left side of the screen, which will take you to the Welcome page. Then click on \"Sign up Now\" on the right side of the page.     You will be asked to enter the access code listed below, as well as some personal information. Please follow the directions to create your username and password.     Your access code is: [email or web address]  Expires: 2017 12:36 PM     Your access code will  in 90 days. If you need help or a new code, please call your [place] clinic or [phone].        Care EveryWhere ID     This is your Care EveryWhere ID. This could be used by other organizations to access your [place] medical records  [id number]        Your Vitals Were     Pulse Temperature Height BMI (Body Mass Index)          71 98.3  F (36.8  C) (Tympanic) 5' 11\" (1.803 m) 37.66 kg/m2         Blood Pressure from Last 3 Encounters:   05/10/17 122/74   17 (!) 170/98   17 152/87    Weight from Last 3 Encounters:   05/10/17 " 270 lb (122.5 kg)   02/07/17 275 lb 9.6 oz (125 kg)   01/10/17 250 lb (113.4 kg)              We Performed the Following     Uric acid          Today's Medication Changes          These changes are accurate as of: 5/10/17 12:36 PM.  If you have any questions, ask your nurse or doctor.               Start taking these medicines.        Dose/Directions    allopurinol 300 MG tablet   Commonly known as:  ZYLOPRIM   Used for:  Acute gouty arthritis   Started by:  Nito Pruitt MD        Dose:  300 mg   Take 1 tablet (300 mg) by mouth daily   Quantity:  30 tablet   Refills:  11       indomethacin 50 MG capsule   Commonly known as:  INDOCIN   Used for:  Acute gouty arthritis   Started by:  Nito Pruitt MD        Dose:  50 mg   Take 1 capsule (50 mg) by mouth 3 times daily (with meals)   Quantity:  42 capsule   Refills:  5            Where to get your medicines      These medications were sent to [facility] [address]  [address] [address]     Phone:  [phone]     allopurinol 300 MG tablet    indomethacin 50 MG capsule                Primary Care Provider Office Phone # Fax #    Nito Pruitt [phone]       [facility] [address] [address]        Thank you!     Thank you for choosing [facility]  for your care. Our goal is always to provide you with excellent care. Hearing back from our patients is one way we can continue to improve our services. Please take a few minutes to complete the written survey that you may receive in the mail after your visit with us. Thank you!             Your Updated Medication List - Protect others around you: Learn how to safely use, store and throw away your medicines at www.disposemymeds.org.          This list is accurate as of: 5/10/17 12:37 PM.  Always use your most recent med list.                   Brand Name Dispense Instructions for use     allopurinol 300 MG tablet    ZYLOPRIM    30 tablet    Take 1 tablet (300 mg) by mouth daily       indomethacin 50 MG capsule    INDOCIN    42 capsule    Take 1 capsule (50 mg) by mouth 3 times daily (with meals)       lisinopril 10 MG tablet    PRINIVIL/ZESTRIL    90 tablet    Take 1 tablet (10 mg) by mouth daily       vitamin D 2000 UNITS tablet      Take 1 tablet by mouth daily.

## 2017-05-10 NOTE — PROGRESS NOTES
"  SUBJECTIVE:                                                    Alen Gibbs is a 46 year old male who presents to clinic today for the following health issues:      Concern - Gout     Onset: Sunday (3 days ago)    Description:   Pt states that the pain in his left foot began last Sunday and has progressively gotten worse. He is concerned it is gout.    Intensity: 6-7/10    Progression of Symptoms:  worsening and constant    Accompanying Signs & Symptoms:  Swelling, Tenderness, pain and redness.       Previous history of similar problem:   Yes, previously diagnosed with Gout in the same location.    Precipitating factors:   Worsened by: Walking    Alleviating factors:  Improved by: None       Therapies Tried and outcome: Naproxen helped minimally.        Current Outpatient Prescriptions:      lisinopril (PRINIVIL,ZESTRIL) 10 MG tablet, Take 1 tablet (10 mg) by mouth daily, Disp: 90 tablet, Rfl: 3     Cholecalciferol (VITAMIN D) 2000 UNIT tablet, Take 1 tablet by mouth daily., Disp: , Rfl:     Patient Active Problem List   Diagnosis     CARDIOVASCULAR SCREENING; LDL GOAL LESS THAN 130     Tinea versicolor     Essential hypertension with goal blood pressure less than 130/80     Blood pressure 122/74, pulse 71, temperature 98.3  F (36.8  C), temperature source Tympanic, height 5' 11\" (1.803 m), weight 270 lb (122.5 kg).    Exam:  GENERAL APPEARANCE: healthy, alert and no distress  MS: arthritis of the left midfoot. The toes and MTP joints, and the ankle and heel are normal.   SKIN: no suspicious lesions or rashes  PSYCH: mentation appears normal and affect normal/bright    (M10.9) Acute gouty arthritis  (primary encounter diagnosis)  Comment:   Plan: Uric acid, allopurinol (ZYLOPRIM) 300 MG         tablet, indomethacin (INDOCIN) 50 MG capsule        We discussed the factors including the lower protein diet, at 2 ounces daily. Stay well hydrated and the urine should be dilute.   Do the lab today for the uric and " annually. The last in 2010 was 9.1, very high. The lower the better and 3.0 is very low.   For the inflammation use Indomethacin at 50 mg with non spicy food, three times daily til better. When better you may switch to Advil.   Take only one NSAID at a time. Ice may be used but avoid heat. 3-4 weeks after all the symptoms are gone, then start Allopurinol to prevent the   Production of the uric acid in the body. This will be a lifelong medication. Recheck in the clinic annually if doing well.     Nito Pruitt

## 2017-05-10 NOTE — NURSING NOTE
"Chief Complaint   Patient presents with     Gout       Initial /74 (BP Location: Left arm, Patient Position: Chair, Cuff Size: Adult Large)  Pulse 71  Temp 98.3  F (36.8  C) (Tympanic)  Ht 5' 11\" (1.803 m)  Wt 270 lb (122.5 kg)  BMI 37.66 kg/m2 Estimated body mass index is 37.66 kg/(m^2) as calculated from the following:    Height as of this encounter: 5' 11\" (1.803 m).    Weight as of this encounter: 270 lb (122.5 kg).  Medication Reconciliation: complete    "

## 2017-05-10 NOTE — PATIENT INSTRUCTIONS
Thank you for choosing [facility].  You may be receiving a survey in the mail from Kieran George regarding your visit today.  Please take a few minutes to complete and return the survey to let us know how we are doing.      If you have questions or concerns, please contact us via [employer] or you can contact your care team at [phone].    Our Clinic hours are:  Monday 6:40 am  to 7:00 pm  Tuesday -Friday 6:40 am to 5:00 pm    The [place] outpatient lab hours are:  Monday - Friday 6:10 am to 4:45 pm  Saturdays 7:00 am to 11:00 am  Appointments are required, call [phone]    If you have clinical questions after hours or would like to schedule an appointment,  call the clinic at [phone].      (M10.9) Acute gouty arthritis  (primary encounter diagnosis)  Comment:   Plan: Uric acid, allopurinol (ZYLOPRIM) 300 MG         tablet, indomethacin (INDOCIN) 50 MG capsule        We discussed the factors including the lower protein diet, at 2 ounces daily. Stay well hydrated and the urine should be dilute.   Do the lab today for the uric and annually. The last in 2010 was 9.1, very high. The lower the better and 3.0 is very low.   For the inflammation use Indomethacin at 50 mg with non spicy food, three times daily til better. When better you may switch to Advil.   Take only one NSAID at a time. Ice may be used but avoid heat. 3-4 weeks after all the symptoms are gone, then start Allopurinol to prevent the   Production of the uric acid in the body. This will be a lifelong medication. Recheck in the clinic annually if doing well.

## 2017-05-15 DIAGNOSIS — B36.0 TINEA VERSICOLOR: ICD-10-CM

## 2017-05-15 NOTE — TELEPHONE ENCOUNTER
Reason for Call:  Other prescription    Detailed comments: pt is calling regarding medication diflucan, no other information was given at this time     Phone Number Patient can be reached at: Home number on file [phone] (home)    Best Time: any     Can we leave a detailed message on this number? YES    Call taken on 5/15/2017 at 7:52 AM by Jocelyn Guido

## 2017-05-16 NOTE — TELEPHONE ENCOUNTER
S-(situation): Patient wanting refill of diflucan.    B-(background): Patient has taken in the past for Tinea versicolor.  Patient states that he has had this in the past and his current rash is the same.      A-(assessment): Patient asking for refill of diflucan.  Patient states that he was told by PCP to call if needing refills.    R-(recommendations): Order pended as last prescribed.    Ashley Diallo RN

## 2017-05-17 RX ORDER — FLUCONAZOLE 150 MG/1
150 TABLET ORAL DAILY
Qty: 5 TABLET | Refills: 5 | Status: SHIPPED | OUTPATIENT
Start: 2017-05-17 | End: 2018-07-12

## 2017-08-02 DIAGNOSIS — I10 ESSENTIAL HYPERTENSION WITH GOAL BLOOD PRESSURE LESS THAN 130/80: ICD-10-CM

## 2017-08-02 NOTE — TELEPHONE ENCOUNTER
Lisinopril      Last Written Prescription Date: 05/20/16  Last Fill Quantity: 90, # refills: 3  Last Office Visit with G, P or [facility] prescribing provider: 05/10/17       Potassium   Date Value Ref Range Status   05/20/2016 4.0 3.4 - 5.3 mmol/L Final     Creatinine   Date Value Ref Range Status   05/20/2016 1.09 0.66 - 1.25 mg/dL Final     BP Readings from Last 3 Encounters:   05/10/17 122/74   02/25/17 (!) 170/98   02/22/17 152/87

## 2017-08-07 RX ORDER — LISINOPRIL 10 MG/1
TABLET ORAL
Qty: 90 TABLET | Refills: 3 | Status: SHIPPED | OUTPATIENT
Start: 2017-08-07 | End: 2018-10-17

## 2017-12-18 ENCOUNTER — OFFICE VISIT (OUTPATIENT)
Dept: FAMILY MEDICINE | Facility: CLINIC | Age: 47
End: 2017-12-18
Payer: COMMERCIAL

## 2017-12-18 VITALS
DIASTOLIC BLOOD PRESSURE: 79 MMHG | HEIGHT: 71 IN | TEMPERATURE: 97.9 F | SYSTOLIC BLOOD PRESSURE: 137 MMHG | WEIGHT: 269 LBS | HEART RATE: 98 BPM | BODY MASS INDEX: 37.66 KG/M2

## 2017-12-18 DIAGNOSIS — G89.29 CHRONIC PAIN OF RIGHT KNEE: Primary | ICD-10-CM

## 2017-12-18 DIAGNOSIS — M25.561 CHRONIC PAIN OF RIGHT KNEE: Primary | ICD-10-CM

## 2017-12-18 PROCEDURE — 99213 OFFICE O/P EST LOW 20 MIN: CPT | Performed by: FAMILY MEDICINE

## 2017-12-18 NOTE — PATIENT INSTRUCTIONS
Thank you for choosing [facility].  You may be receiving a survey in the mail from Kieran George regarding your visit today.  Please take a few minutes to complete and return the survey to let us know how we are doing.      If you have questions or concerns, please contact us via [employer] or you can contact your care team at [phone].    Our Clinic hours are:  Monday 6:40 am  to 7:00 pm  Tuesday -Friday 6:40 am to 5:00 pm    The [place] outpatient lab hours are:  Monday - Friday 6:10 am to 4:45 pm  Saturdays 7:00 am to 11:00 am  Appointments are required, call [phone]    If you have clinical questions after hours or would like to schedule an appointment,  call the clinic at [phone].    (M25.561,  G89.29) Chronic pain of right knee  (primary encounter diagnosis)  Comment:   Plan: MR Knee Right w/o Contrast        We discussed the modification of activities and avoid repetitive squatting and kneeling and kicking. Use ice and advil and Tylenol as needed.   The MRI of the right knee without contrast is ordered and go there now or call [phone]. We will call the results. If these are normal and the symptoms   Then a referral to Ortho would be done.

## 2017-12-18 NOTE — MR AVS SNAPSHOT
After Visit Summary   12/18/2017    Alen Gibbs    MRN: [id number]           Patient Information     Date Of Birth          1970        Visit Information        Provider Department      12/18/2017 1:40 PM Nito Pruitt MD [facility]        Today's Diagnoses     Chronic pain of right knee    -  1      Care Instructions          Thank you for choosing [facility].  You may be receiving a survey in the mail from [facility] regarding your visit today.  Please take a few minutes to complete and return the survey to let us know how we are doing.      If you have questions or concerns, please contact us via [employer] or you can contact your care team at [phone].    Our Clinic hours are:  Monday 6:40 am  to 7:00 pm  Tuesday -Friday 6:40 am to 5:00 pm    The [place] outpatient lab hours are:  Monday - Friday 6:10 am to 4:45 pm  Saturdays 7:00 am to 11:00 am  Appointments are required, call [phone]    If you have clinical questions after hours or would like to schedule an appointment,  call the clinic at [phone].    (M25.561,  G89.29) Chronic pain of right knee  (primary encounter diagnosis)  Comment:   Plan: MR Knee Right w/o Contrast        We discussed the modification of activities and avoid repetitive squatting and kneeling and kicking. Use ice and advil and Tylenol as needed.   The MRI of the right knee without contrast is ordered and go there now or call [phone]. We will call the results. If these are normal and the symptoms   Then a referral to Ortho would be done.             Follow-ups after your visit        Future tests that were ordered for you today     Open Future Orders        Priority Expected Expires Ordered    MR Knee Right w/o Contrast Routine  12/18/2018 12/18/2017            Who to contact     If you have questions or need follow up information about today's clinic visit or your schedule please contact [facility] directly at  "[phone].  Normal or non-critical lab and imaging results will be communicated to you by MyChart, letter or phone within 4 business days after the clinic has received the results. If you do not hear from us within 7 days, please contact the clinic through [employer] or phone. If you have a critical or abnormal lab result, we will notify you by phone as soon as possible.  Submit refill requests through [email or web address] or call your pharmacy and they will forward the refill request to us. Please allow 3 business days for your refill to be completed.          Additional Information About Your Visit        [employer] Information     [email or web address] lets you send messages to your doctor, view your test results, renew your prescriptions, schedule appointments and more. To sign up, go to [email or web address] . Click on \"Log in\" on the left side of the screen, which will take you to the Welcome page. Then click on \"Sign up Now\" on the right side of the page.     You will be asked to enter the access code listed below, as well as some personal information. Please follow the directions to create your username and password.     Your access code is: [email or web address]  Expires: 3/18/2018  2:12 PM     Your access code will  in 90 days. If you need help or a new code, please call your [place] clinic or [phone].        Care EveryWhere ID     This is your Care EveryWhere ID. This could be used by other organizations to access your [place] medical records  [id number]        Your Vitals Were     Pulse Temperature Height BMI (Body Mass Index)          98 97.9  F (36.6  C) (Tympanic) 5' 11\" (1.803 m) 37.52 kg/m2         Blood Pressure from Last 3 Encounters:   17 137/79   05/10/17 122/74   17 (!) 170/98    Weight from Last 3 Encounters:   17 269 lb (122 kg)   05/10/17 270 lb (122.5 kg)   17 275 lb 9.6 oz (125 kg)                 Today's Medication Changes          These changes are accurate as of: 17  2:12 PM.  " If you have any questions, ask your nurse or doctor.               Stop taking these medicines if you haven't already. Please contact your care team if you have questions.     indomethacin 50 MG capsule   Commonly known as:  INDOCIN   Stopped by:  Nito Pruitt MD                    Primary Care Provider Office Phone # Fax #    Nito Pruitt [phone] [address] [address]        Equal Access to Services     ROMANA RODRIGES : Hadii aad ku hadasho Soomaali, waaxda luqadaha, qaybta kaalmada adeegyada, waxay idiin hayaan adeeg kharash la'parker . So [facility] [phone].    ATENCIÓN: Si habla español, tiene a garcia disposición servicios gratuitos de asistencia lingüística. Llame al [phone].    We comply with applicable federal civil rights laws and [place] laws. We do not discriminate on the basis of race, color, national origin, age, disability, sex, sexual orientation, or gender identity.            Thank you!     Thank you for choosing [facility]  for your care. Our goal is always to provide you with excellent care. Hearing back from our patients is one way we can continue to improve our services. Please take a few minutes to complete the written survey that you may receive in the mail after your visit with us. Thank you!             Your Updated Medication List - Protect others around you: Learn how to safely use, store and throw away your medicines at www.disposemymeds.org.          This list is accurate as of: 12/18/17  2:12 PM.  Always use your most recent med list.                   Brand Name Dispense Instructions for use Diagnosis    allopurinol 300 MG tablet    ZYLOPRIM    30 tablet    Take 1 tablet (300 mg) by mouth daily    Acute gouty arthritis       fluconazole 150 MG tablet    DIFLUCAN    5 tablet    Take 1 tablet (150 mg) by mouth daily    Tinea versicolor       lisinopril 10 MG tablet    PRINIVIL/ZESTRIL    90 tablet    TAKE ONE TABLET  BY MOUTH EVERY DAY    Essential hypertension with goal blood pressure less than 130/80       vitamin D 2000 UNITS tablet      Take 1 tablet by mouth daily.

## 2017-12-18 NOTE — PROGRESS NOTES
"  SUBJECTIVE:   Alen Gibbs is a 46 year old male who presents to clinic today for the following health issues:      Musculoskeletal problem/pain      Duration: Worse the past few months.    Description  Location: Right knee pain.    Intensity:  Moderate; more than 50% of the time. It can hurt with just sitting.     Driving long distances in a car worsens it.     Accompanying signs and symptoms: numbness in the right middle toe.     History  Previous similar problem: no   Previous evaluation:  none    Precipitating or alleviating factors:  Trauma or overuse: YES- Kayaking and fell on a rock.   Aggravating factors include: sitting, walking and bending, and squatting.     Therapies tried and outcome: Ibuprofen as needed-not helping at times.      Current Outpatient Prescriptions:      lisinopril (PRINIVIL/ZESTRIL) 10 MG tablet, TAKE ONE TABLET BY MOUTH EVERY DAY, Disp: 90 tablet, Rfl: 3     fluconazole (DIFLUCAN) 150 MG tablet, Take 1 tablet (150 mg) by mouth daily, Disp: 5 tablet, Rfl: 5     allopurinol (ZYLOPRIM) 300 MG tablet, Take 1 tablet (300 mg) by mouth daily, Disp: 30 tablet, Rfl: 11     Cholecalciferol (VITAMIN D) 2000 UNIT tablet, Take 1 tablet by mouth daily., Disp: , Rfl:     Patient Active Problem List   Diagnosis     CARDIOVASCULAR SCREENING; LDL GOAL LESS THAN 130     Tinea versicolor     Essential hypertension with goal blood pressure less than 130/80       Blood pressure 137/79, pulse 98, temperature 97.9  F (36.6  C), temperature source Tympanic, height 5' 11\" (1.803 m), weight 269 lb (122 kg).    Exam:  GENERAL APPEARANCE: healthy, alert and no distress  MS: tender to palpation mildly on the right medial femoral condyle and on the right tibial tubercle. The rest of the knees are not tender and there are no effusions.   The ROM is full bilaterally and he can squat. No popliteal tenderness or redness of swelling. Apley's test and the collateral and cruciate ligaments are intact.   SKIN: no " suspicious lesions or rashes  PSYCH: mentation appears normal and affect normal/bright      (M25.561,  G89.29) Chronic pain of right knee  (primary encounter diagnosis)  Comment:   Plan: MR Knee Right w/o Contrast        We discussed the modification of activities and avoid repetitive squatting and kneeling and kicking. Use ice and advil and Tylenol as needed.   The MRI of the right knee without contrast is ordered and go there now or call [phone]. We will call the results. If these are normal and the symptoms   Then a referral to Ortho would be done.     Nito Pruitt

## 2017-12-18 NOTE — NURSING NOTE
"Chief Complaint   Patient presents with     Knee Pain     RIght side knee pain, worse over the past few months.       Initial /79  Pulse 98  Temp 97.9  F (36.6  C) (Tympanic)  Ht 5' 11\" (1.803 m)  Wt 269 lb (122 kg)  BMI 37.52 kg/m2 Estimated body mass index is 37.52 kg/(m^2) as calculated from the following:    Height as of this encounter: 5' 11\" (1.803 m).    Weight as of this encounter: 269 lb (122 kg).  Medication Reconciliation: complete  "

## 2017-12-20 ENCOUNTER — HOSPITAL ENCOUNTER (OUTPATIENT)
Dept: MRI IMAGING | Facility: CLINIC | Age: 47
Discharge: HOME | End: 2017-12-20
Attending: FAMILY MEDICINE | Admitting: FAMILY MEDICINE
Payer: COMMERCIAL

## 2017-12-20 DIAGNOSIS — M25.561 CHRONIC PAIN OF RIGHT KNEE: ICD-10-CM

## 2017-12-20 DIAGNOSIS — G89.29 CHRONIC PAIN OF RIGHT KNEE: ICD-10-CM

## 2017-12-20 PROCEDURE — 73721 MRI JNT OF LWR EXTRE W/O DYE: CPT | Mod: RT

## 2018-01-08 ENCOUNTER — OFFICE VISIT (OUTPATIENT)
Dept: FAMILY MEDICINE | Facility: CLINIC | Age: 48
End: 2018-01-08
Payer: COMMERCIAL

## 2018-01-08 VITALS
TEMPERATURE: 97.5 F | HEIGHT: 71 IN | DIASTOLIC BLOOD PRESSURE: 82 MMHG | SYSTOLIC BLOOD PRESSURE: 110 MMHG | WEIGHT: 274 LBS | BODY MASS INDEX: 38.36 KG/M2 | HEART RATE: 73 BPM

## 2018-01-08 DIAGNOSIS — M22.41 CHONDROMALACIA OF PATELLA, RIGHT: Primary | ICD-10-CM

## 2018-01-08 PROCEDURE — 99213 OFFICE O/P EST LOW 20 MIN: CPT | Performed by: FAMILY MEDICINE

## 2018-01-08 NOTE — PROGRESS NOTES
"  SUBJECTIVE:   Alen Gibbs is a 47 year old male who presents to clinic today for the following health issues:      KNEE PAIN:  Here for a follow up on right knee pain.  He was seen in clinic on 12-18-17.  Had a MRI done on 12-20-17.  Here to discuss about having an injection done.    CLINIC PLAN FROM THE 12-18-17 VISIT IS COPIED BELOW:  Plan: MR Knee Right w/o Contrast        We discussed the modification of activities and avoid repetitive squatting and kneeling and kicking. Use ice and advil and Tylenol as needed.   The MRI of the right knee without contrast is ordered and go there now or call [phone]. We will call the results. If these are normal and the symptoms   Then a referral to Ortho would be done.     The MRI of the knee on 12-26-17 showed: IMPRESSION: Moderate to marked chondromalacia patella with mild  chondromalacia elsewhere in the knee as described above.         Current Outpatient Prescriptions:      lisinopril (PRINIVIL/ZESTRIL) 10 MG tablet, TAKE ONE TABLET BY MOUTH EVERY DAY, Disp: 90 tablet, Rfl: 3     fluconazole (DIFLUCAN) 150 MG tablet, Take 1 tablet (150 mg) by mouth daily, Disp: 5 tablet, Rfl: 5     allopurinol (ZYLOPRIM) 300 MG tablet, Take 1 tablet (300 mg) by mouth daily, Disp: 30 tablet, Rfl: 11     Cholecalciferol (VITAMIN D) 2000 UNIT tablet, Take 1 tablet by mouth daily., Disp: , Rfl:     Patient Active Problem List   Diagnosis     CARDIOVASCULAR SCREENING; LDL GOAL LESS THAN 130     Tinea versicolor     Essential hypertension with goal blood pressure less than 130/80       Blood pressure 110/82, pulse 73, temperature 97.5  F (36.4  C), temperature source Tympanic, height 5' 11\" (1.803 m), weight 274 lb (124.3 kg).    Exam:  GENERAL APPEARANCE: healthy, alert and no distress  MS: tender to palpation on the right patella to compression.   PSYCH: mentation appears normal and affect normal/bright      (M22.41) Chondromalacia of patella, right  (primary encounter diagnosis)  Comment: "   Plan: ORTHO  REFERRAL        We discussed the condition and to minimize the squatting and kneeling. Use the knee pads.   Do the straight leg raising exercises as discussed. Use the ice and Tylenol and   Advil as discussed. If not better then consider the Ortho appt.     Nito Pruitt

## 2018-01-08 NOTE — NURSING NOTE
"Chief Complaint   Patient presents with     Knee Pain     Follow up on knee pain.       Initial /82  Pulse 73  Temp 97.5  F (36.4  C) (Tympanic)  Ht 5' 11\" (1.803 m)  Wt 274 lb (124.3 kg)  BMI 38.22 kg/m2 Estimated body mass index is 38.22 kg/(m^2) as calculated from the following:    Height as of this encounter: 5' 11\" (1.803 m).    Weight as of this encounter: 274 lb (124.3 kg).  Medication Reconciliation: complete  "

## 2018-01-08 NOTE — PATIENT INSTRUCTIONS
Thank you for choosing [facility].  You may be receiving a survey in the mail from Kieran George regarding your visit today.  Please take a few minutes to complete and return the survey to let us know how we are doing.      If you have questions or concerns, please contact us via [facility] or you can contact your care team at [phone].    Our Clinic hours are:  Monday 6:40 am  to 7:00 pm  Tuesday -Friday 6:40 am to 5:00 pm    The [place] outpatient lab hours are:  Monday - Friday 6:10 am to 4:45 pm  Saturdays 7:00 am to 11:00 am  Appointments are required, call [phone]    If you have clinical questions after hours or would like to schedule an appointment,  call the clinic at [phone].    (M22.41) Chondromalacia of patella, right  (primary encounter diagnosis)  Comment:   Plan: ORTHO  REFERRAL        We discussed the condition and to minimize the squatting and kneeling. Use the knee pads.   Do the straight leg raising exercises as discussed. Use the ice and Tylenol and   Advil as discussed. If not better then consider the Ortho appt.

## 2018-01-08 NOTE — MR AVS SNAPSHOT
After Visit Summary   1/8/2018    Alen Gibbs    MRN: [id number]           Patient Information     Date Of Birth          1970        Visit Information        Provider Department      1/8/2018 6:40 AM Nito Pruitt MD [facility]        Today's Diagnoses     Chondromalacia of patella, right    -  1      Care Instructions          Thank you for choosing [facility]  You may be receiving a survey in the mail from Kieran Hypecaltavia regarding your visit today.  Please take a few minutes to complete and return the survey to let us know how we are doing.      If you have questions or concerns, please contact us via [facility] or you can contact your care team at [phone].    Our Clinic hours are:  Monday 6:40 am  to 7:00 pm  Tuesday -Friday 6:40 am to 5:00 pm    The [place] outpatient lab hours are:  Monday - Friday 6:10 am to 4:45 pm  Saturdays 7:00 am to 11:00 am  Appointments are required, call [phone]    If you have clinical questions after hours or would like to schedule an appointment,  call the clinic at [phone].    (M22.41) Chondromalacia of patella, right  (primary encounter diagnosis)  Comment:   Plan: ORTHO  REFERRAL        We discussed the condition and to minimize the squatting and kneeling. Use the knee pads.   Do the straight leg raising exercises as discussed. Use the ice and Tylenol and   Advil as discussed. If not better then consider the Ortho appt.             Follow-ups after your visit        Additional Services     ORTHO  REFERRAL       [facility] is referring you to the Orthopedic  Services at [facility].       The  Representative will assist you in the coordination of your Orthopedic and Musculoskeletal Care as prescribed by your physician.    The  Representative will call you within 1 business day to help schedule your appointment, or you may contact the  "[facility] Representative at:    All areas ~ [phone]     Type of Referral : Surgical / Specialist       Timeframe requested: Routine    Coverage of these services is subject to the terms and limitations of your health insurance plan.  Please call member services at your health plan with any benefit or coverage questions.      If X-rays, CT or MRI's have been performed, please contact the facility where they were done to arrange for , prior to your scheduled appointment.  Please bring this referral request to your appointment and present it to your specialist.                  Who to contact     If you have questions or need follow up information about today's clinic visit or your schedule please contact [facility] directly at [phone].  Normal or non-critical lab and imaging results will be communicated to you by [employer], letter or phone within 4 business days after the clinic has received the results. If you do not hear from us within 7 days, please contact the clinic through [employer] or phone. If you have a critical or abnormal lab result, we will notify you by phone as soon as possible.  Submit refill requests through [employer] or call your pharmacy and they will forward the refill request to us. Please allow 3 business days for your refill to be completed.          Additional Information About Your Visit        [employer] Information     [employer] lets you send messages to your doctor, view your test results, renew your prescriptions, schedule appointments and more. To sign up, go to [email or web address] . Click on \"Log in\" on the left side of the screen, which will take you to the Welcome page. Then click on \"Sign up Now\" on the right side of the page.     You will be asked to enter the access code listed below, as well as some personal information. Please follow the directions to create your username and password.     Your access code is: [email or web address]  Expires: 3/18/2018  2:12 PM   " "  Your access code will  in 90 days. If you need help or a new code, please call your [facility] clinic or [phone].        Care EveryWhere ID     This is your Care EveryWhere ID. This could be used by other organizations to access your [facility] medical records  [id number]        Your Vitals Were     Pulse Temperature Height BMI (Body Mass Index)          73 97.5  F (36.4  C) (Tympanic) 5' 11\" (1.803 m) 38.22 kg/m2         Blood Pressure from Last 3 Encounters:   18 110/82   17 137/79   05/10/17 122/74    Weight from Last 3 Encounters:   18 274 lb (124.3 kg)   17 269 lb (122 kg)   05/10/17 270 lb (122.5 kg)              We Performed the Following     ORTHO  REFERRAL        Primary Care Provider Office Phone # Fax #    Nito Pruitt [phone] [address] [facility] [address]        Equal Access to Services     [facility] : Hadii tyler ku hadasho Soomaali, waaxda luqadaha, qaybta kaalmada adeegyajoyce, von talbert . So [facility] [phone].    ATENCIÓN: Si habla español, tiene a garcia disposición servicios gratuitos de asistencia lingüística. Llame al [phone].    We comply with applicable federal civil rights laws and [place] laws. We do not discriminate on the basis of race, color, national origin, age, disability, sex, sexual orientation, or gender identity.            Thank you!     Thank you for choosing [facility]  for your care. Our goal is always to provide you with excellent care. Hearing back from our patients is one way we can continue to improve our services. Please take a few minutes to complete the written survey that you may receive in the mail after your visit with us. Thank you!             Your Updated Medication List - Protect others around you: Learn how to safely use, store and throw away your medicines at www.disposemymeds.org.          This list is accurate as of: 18  7:19 " AM.  Always use your most recent med list.                   Brand Name Dispense Instructions for use Diagnosis    allopurinol 300 MG tablet    ZYLOPRIM    30 tablet    Take 1 tablet (300 mg) by mouth daily    Acute gouty arthritis       fluconazole 150 MG tablet    DIFLUCAN    5 tablet    Take 1 tablet (150 mg) by mouth daily    Tinea versicolor       lisinopril 10 MG tablet    PRINIVIL/ZESTRIL    90 tablet    TAKE ONE TABLET BY MOUTH EVERY DAY    Essential hypertension with goal blood pressure less than 130/80       vitamin D 2000 UNITS tablet      Take 1 tablet by mouth daily.

## 2018-07-10 ENCOUNTER — TELEPHONE (OUTPATIENT)
Dept: FAMILY MEDICINE | Facility: CLINIC | Age: 48
End: 2018-07-10

## 2018-07-10 DIAGNOSIS — M10.9 ACUTE GOUTY ARTHRITIS: ICD-10-CM

## 2018-07-10 DIAGNOSIS — B36.0 TINEA VERSICOLOR: ICD-10-CM

## 2018-07-11 NOTE — TELEPHONE ENCOUNTER
"Requested Prescriptions   Pending Prescriptions Disp Refills     allopurinol (ZYLOPRIM) 300 MG tablet [Pharmacy Med Name: ALLOPURINOL 300MG TABS]  Last Written Prescription Date:  05/10/2017  Last Fill Quantity: 30,  # refills: 11   Last office visit: 1/8/2018 with prescribing provider:  yadiel   Future Office Visit:     30 tablet 11     Sig: TAKE ONE TABLET BY MOUTH EVERY DAY    Gout Agents Protocol Failed    7/10/2018  2:48 PM       Failed - CBC on file in past 12 months    Recent Labs   Lab Test  07/16/15   0902   WBC  7.8   RBC  5.25   HGB  15.2   HCT  45.5   PLT  179       For [facility] ONLY: [id number] = WBC, [id number] = RBC         Failed - ALT on file in past 12 months    Recent Labs   Lab Test  07/16/15   0902   ALT  33            Failed - Has Uric Acid on file in past 12 months and value is less than 6    Recent Labs   Lab Test  05/10/17   1239   URIC  8.0*     If level is 6mg/dL or greater, ok to refill one time and refer to provider.          Failed - Normal serum creatinine on file in the past 12 months    Recent Labs   Lab Test  05/20/16   1159   CR  1.09            Passed - Recent (12 mo) or future (30 days) visit within the authorizing provider's specialty    Patient had office visit in the last 12 months or has a visit in the next 30 days with authorizing provider or within the authorizing provider's specialty.  See \"Patient Info\" tab in inbasket, or \"Choose Columns\" in Meds & Orders section of the refill encounter.           Passed - Patient is age 18 or older        Carlos ALDRICH (R)    "

## 2018-07-12 RX ORDER — ALLOPURINOL 300 MG/1
TABLET ORAL
Qty: 30 TABLET | Refills: 1 | Status: SHIPPED | OUTPATIENT
Start: 2018-07-12 | End: 2018-10-10

## 2018-07-12 RX ORDER — FLUCONAZOLE 150 MG/1
150 TABLET ORAL DAILY
Qty: 5 TABLET | Refills: 0 | Status: SHIPPED | OUTPATIENT
Start: 2018-07-12 | End: 2018-07-27

## 2018-07-12 NOTE — TELEPHONE ENCOUNTER
Pt notified of the allopurinol refill then he requested fluconazole.     He reports he takes this only PRN, usually takes for a week to and this is effective for the rash.    fluconazole (DIFLUCAN) 150 MG tablet 5 tablet 5 5/17/2017  No   Sig - Route: Take 1 tablet (150 mg) by mouth daily - Oral   Class: E-Prescribe   Order: [id number]   E-Prescribing Status: Receipt confirmed by pharmacy (5/17/2017  9:39 AM CDT)       Hellen RICE RN

## 2018-07-12 NOTE — TELEPHONE ENCOUNTER
Ordered 5 days treatment with Diflucan, if no improvement he needs to follow up.     MACY Valadez CNP, covering for Dr. Pruitt

## 2018-07-12 NOTE — TELEPHONE ENCOUNTER
Refilled  Follow up with Dr. Pruitt for lab work and med recheck in 1-2 months     Paola Mondragon, MACY NICOLAS, covering for Dr. Pruitt

## 2018-07-12 NOTE — TELEPHONE ENCOUNTER
Routing refill request to provider for review/approval because:  Labs not current:  See below    Hellen RICE RN

## 2018-07-13 NOTE — TELEPHONE ENCOUNTER
CSS-ok to deliver message below from Paola.     Left message for patient to return call to clinic.     Aminata Mitchell RN

## 2018-07-16 NOTE — TELEPHONE ENCOUNTER
Patient notified of prescriptions sent to pharmacy and recommendations to follow up in clinic if no improvement after taking Diflucan  Patient verbalized understanding and agreed with provider's plan    Gabbi DAVISON Rn

## 2018-07-27 ENCOUNTER — OFFICE VISIT (OUTPATIENT)
Dept: FAMILY MEDICINE | Facility: CLINIC | Age: 48
End: 2018-07-27
Payer: COMMERCIAL

## 2018-07-27 ENCOUNTER — APPOINTMENT (OUTPATIENT)
Dept: CT IMAGING | Facility: CLINIC | Age: 48
End: 2018-07-27
Attending: FAMILY MEDICINE
Payer: COMMERCIAL

## 2018-07-27 ENCOUNTER — HOSPITAL ENCOUNTER (EMERGENCY)
Facility: CLINIC | Age: 48
Discharge: HOME | End: 2018-07-27
Attending: FAMILY MEDICINE | Admitting: FAMILY MEDICINE
Payer: COMMERCIAL

## 2018-07-27 VITALS
DIASTOLIC BLOOD PRESSURE: 88 MMHG | SYSTOLIC BLOOD PRESSURE: 133 MMHG | RESPIRATION RATE: 13 BRPM | TEMPERATURE: 97.9 F | OXYGEN SATURATION: 94 %

## 2018-07-27 VITALS — OXYGEN SATURATION: 96 %

## 2018-07-27 DIAGNOSIS — R53.1 WEAKNESS: Primary | ICD-10-CM

## 2018-07-27 DIAGNOSIS — R42 DIZZINESS: ICD-10-CM

## 2018-07-27 DIAGNOSIS — J01.90 ACUTE NON-RECURRENT SINUSITIS, UNSPECIFIED LOCATION: ICD-10-CM

## 2018-07-27 DIAGNOSIS — E86.0 DEHYDRATION: ICD-10-CM

## 2018-07-27 DIAGNOSIS — R55 NEAR SYNCOPE: ICD-10-CM

## 2018-07-27 DIAGNOSIS — R51.9 SEVERE HEADACHE: ICD-10-CM

## 2018-07-27 LAB
ANION GAP SERPL CALCULATED.3IONS-SCNC: 5 MMOL/L (ref 3–14)
BASOPHILS # BLD AUTO: 0.1 10E9/L (ref 0–0.2)
BASOPHILS NFR BLD AUTO: 1.1 %
BUN SERPL-MCNC: 13 MG/DL (ref 7–30)
CALCIUM SERPL-MCNC: 9 MG/DL (ref 8.5–10.1)
CHLORIDE SERPL-SCNC: 110 MMOL/L (ref 94–109)
CO2 SERPL-SCNC: 24 MMOL/L (ref 20–32)
COHGB MFR BLD: 1.5 % (ref 0–2)
CREAT SERPL-MCNC: 0.97 MG/DL (ref 0.66–1.25)
CRP SERPL-MCNC: 19.9 MG/L (ref 0–8)
DIFFERENTIAL METHOD BLD: NORMAL
EOSINOPHIL # BLD AUTO: 0.1 10E9/L (ref 0–0.7)
EOSINOPHIL NFR BLD AUTO: 2.7 %
ERYTHROCYTE [DISTWIDTH] IN BLOOD BY AUTOMATED COUNT: 12.8 % (ref 10–15)
GFR SERPL CREATININE-BSD FRML MDRD: 82 ML/MIN/1.7M2
GLUCOSE SERPL-MCNC: 111 MG/DL (ref 70–99)
HCT VFR BLD AUTO: 48.6 % (ref 40–53)
HGB BLD-MCNC: 15.9 G/DL (ref 13.3–17.7)
IMM GRANULOCYTES # BLD: 0 10E9/L (ref 0–0.4)
IMM GRANULOCYTES NFR BLD: 0.2 %
LYMPHOCYTES # BLD AUTO: 1.5 10E9/L (ref 0.8–5.3)
LYMPHOCYTES NFR BLD AUTO: 31.9 %
MCH RBC QN AUTO: 29.1 PG (ref 26.5–33)
MCHC RBC AUTO-ENTMCNC: 32.7 G/DL (ref 31.5–36.5)
MCV RBC AUTO: 89 FL (ref 78–100)
MONOCYTES # BLD AUTO: 0.6 10E9/L (ref 0–1.3)
MONOCYTES NFR BLD AUTO: 11.8 %
NEUTROPHILS # BLD AUTO: 2.5 10E9/L (ref 1.6–8.3)
NEUTROPHILS NFR BLD AUTO: 52.3 %
NRBC # BLD AUTO: 0 10*3/UL
NRBC BLD AUTO-RTO: 0 /100
PLATELET # BLD AUTO: 198 10E9/L (ref 150–450)
POTASSIUM SERPL-SCNC: 4.3 MMOL/L (ref 3.4–5.3)
RBC # BLD AUTO: 5.47 10E12/L (ref 4.4–5.9)
SODIUM SERPL-SCNC: 139 MMOL/L (ref 133–144)
TROPONIN I SERPL-MCNC: <0.015 UG/L (ref 0–0.04)
WBC # BLD AUTO: 4.7 10E9/L (ref 4–11)

## 2018-07-27 PROCEDURE — 99285 EMERGENCY DEPT VISIT HI MDM: CPT | Mod: Z6 | Performed by: FAMILY MEDICINE

## 2018-07-27 PROCEDURE — 70450 CT HEAD/BRAIN W/O DYE: CPT

## 2018-07-27 PROCEDURE — 82375 ASSAY CARBOXYHB QUANT: CPT | Performed by: FAMILY MEDICINE

## 2018-07-27 PROCEDURE — 96376 TX/PRO/DX INJ SAME DRUG ADON: CPT | Performed by: FAMILY MEDICINE

## 2018-07-27 PROCEDURE — 80048 BASIC METABOLIC PNL TOTAL CA: CPT | Performed by: FAMILY MEDICINE

## 2018-07-27 PROCEDURE — 99213 OFFICE O/P EST LOW 20 MIN: CPT | Performed by: NURSE PRACTITIONER

## 2018-07-27 PROCEDURE — 85025 COMPLETE CBC W/AUTO DIFF WBC: CPT | Performed by: FAMILY MEDICINE

## 2018-07-27 PROCEDURE — 96374 THER/PROPH/DIAG INJ IV PUSH: CPT | Performed by: FAMILY MEDICINE

## 2018-07-27 PROCEDURE — 96361 HYDRATE IV INFUSION ADD-ON: CPT | Performed by: FAMILY MEDICINE

## 2018-07-27 PROCEDURE — 86140 C-REACTIVE PROTEIN: CPT | Performed by: FAMILY MEDICINE

## 2018-07-27 PROCEDURE — 99285 EMERGENCY DEPT VISIT HI MDM: CPT | Mod: 25 | Performed by: FAMILY MEDICINE

## 2018-07-27 PROCEDURE — 25000128 H RX IP 250 OP 636: Performed by: FAMILY MEDICINE

## 2018-07-27 PROCEDURE — 96375 TX/PRO/DX INJ NEW DRUG ADDON: CPT | Performed by: FAMILY MEDICINE

## 2018-07-27 PROCEDURE — 84484 ASSAY OF TROPONIN QUANT: CPT | Performed by: FAMILY MEDICINE

## 2018-07-27 RX ORDER — HYDROMORPHONE HYDROCHLORIDE 1 MG/ML
0.5 INJECTION, SOLUTION INTRAMUSCULAR; INTRAVENOUS; SUBCUTANEOUS
Status: DISCONTINUED | OUTPATIENT
Start: 2018-07-27 | End: 2018-07-27 | Stop reason: HOSPADM

## 2018-07-27 RX ORDER — SODIUM CHLORIDE, SODIUM LACTATE, POTASSIUM CHLORIDE, CALCIUM CHLORIDE 600; 310; 30; 20 MG/100ML; MG/100ML; MG/100ML; MG/100ML
1000 INJECTION, SOLUTION INTRAVENOUS CONTINUOUS
Status: DISCONTINUED | OUTPATIENT
Start: 2018-07-27 | End: 2018-07-27 | Stop reason: HOSPADM

## 2018-07-27 RX ORDER — LEVOFLOXACIN 500 MG/1
500 TABLET, FILM COATED ORAL DAILY
Qty: 14 TABLET | Refills: 0 | Status: SHIPPED | OUTPATIENT
Start: 2018-07-27 | End: 2019-02-22

## 2018-07-27 RX ORDER — DIPHENHYDRAMINE HYDROCHLORIDE 50 MG/ML
25 INJECTION INTRAMUSCULAR; INTRAVENOUS ONCE
Status: COMPLETED | OUTPATIENT
Start: 2018-07-27 | End: 2018-07-27

## 2018-07-27 RX ORDER — ONDANSETRON 2 MG/ML
4 INJECTION INTRAMUSCULAR; INTRAVENOUS
Status: COMPLETED | OUTPATIENT
Start: 2018-07-27 | End: 2018-07-27

## 2018-07-27 RX ADMIN — Medication 0.5 MG: at 14:20

## 2018-07-27 RX ADMIN — DIPHENHYDRAMINE HYDROCHLORIDE 25 MG: 50 INJECTION, SOLUTION INTRAMUSCULAR; INTRAVENOUS at 13:00

## 2018-07-27 RX ADMIN — Medication 0.5 MG: at 12:58

## 2018-07-27 RX ADMIN — ONDANSETRON 4 MG: 2 INJECTION INTRAMUSCULAR; INTRAVENOUS at 13:02

## 2018-07-27 RX ADMIN — SODIUM CHLORIDE, POTASSIUM CHLORIDE, SODIUM LACTATE AND CALCIUM CHLORIDE 1000 ML: 600; 310; 30; 20 INJECTION, SOLUTION INTRAVENOUS at 12:57

## 2018-07-27 NOTE — ED AVS SNAPSHOT
[facility] Emergency Department    [address] [address]    Phone:  [phone]    Fax:  [phone]                                       Alen Gibbs   MRN: [id number]    Department:  [facility] Emergency Department   Date of Visit:  7/27/2018           Patient Information     Date Of Birth          1970        Your diagnoses for this visit were:     Acute non-recurrent sinusitis, unspecified location     Dehydration     Near syncope        You were seen by Alen Burgess MD.      Follow-up Information     Schedule an appointment as soon as possible for a visit with Nito Pruitt MD.    Specialty:  Family Practice    Why:  As needed, If symptoms worsen    Contact information:    [address] [facility] [address]  [phone]          Discharge Instructions         Dehydration (Adult)  Dehydration occurs when your body loses too much fluid. This may be the result of prolonged vomiting or diarrhea, excessive sweating, or a high fever. It may also happen if you don t drink enough fluid when you re sick or out in the heat. Misuse of diuretics (water pills) can also be a cause.  Symptoms include thirst and decreased urine output. You may also feel dizzy, weak, fatigued, or very drowsy. The diet described below is usually enough to treat dehydration. In some cases, you may need medicine.  Home care    Drink at least 12 8-ounce glasses of fluid every day to resolve the dehydration. Fluid may include water; orange juice; lemonade; apple, grape, or cranberry juice; clear fruit drinks; electrolyte replacement and sports drinks; and teas and coffee without caffeine. Don't drink alcohol. If you have been diagnosed with a kidney disease, ask your doctor how much and what types of fluids you should drink to prevent dehydration. If you have kidney disease, fluid can build up in the body. This can be dangerous to your health.    If you have a fever, muscle aches, or a  headache as a result of a cold or flu, you may take acetaminophen or ibuprofen, unless another medicine was prescribed. If you have chronic liver or kidney disease, or have ever had a stomach ulcer or gastrointestinal bleeding, talk with your healthcare provider before using these medicines. Don't take aspirin if you are younger than 18 and have a fever. Aspirin raises the chance for severe liver injury.  Follow-up care  Follow up with your healthcare provider, or as advised.  When to seek medical advice  Call your healthcare provider right away if any of these occur:    Continued vomiting    Frequent diarrhea (more than 5 times a day); blood (red or black color) or mucus in diarrhea    Blood in vomit or stool    Swollen abdomen or increasing abdominal pain    Weakness, dizziness, or fainting    Unusual drowsiness or confusion    Reduced urine output or extreme thirst    Fever of 100.4 F (38 C) or higher  Date Last Reviewed: 5/1/2017 2000-2017 [employer]. [address] [place] [address]. All rights reserved. This information is not intended as a substitute for professional medical care. Always follow your healthcare professional's instructions.          Acute Bacterial Rhinosinusitis (ABRS)    Acute bacterial rhinosinusitis (ABRS) is an infection of your nasal cavity and sinuses. It s caused by bacteria. Acute means that you ve had symptoms for less than 4 weeks, but possibly up to 12 weeks.  Understanding your sinuses  The nasal cavity is the large air-filled space behind your nose. The sinuses are a group of spaces formed by the bones of your face. They connect with your nasal cavity. ABRS causes the tissue lining these spaces to become inflamed. Mucus may not drain normally. This leads to facial pain and other symptoms.  What causes ABRS?  ABRS most often follows an upper respiratory infection caused by a virus. Bacteria then infect the lining of your nasal cavity and sinuses. But you  can also get ABRS if you have:    Nasal allergies    Long-term nasal swelling and congestion not caused by allergies    Blockage in the nose  Symptoms of ABRS  The symptoms of ABRS may be different for each person and include:    Nasal congestion or blockage    Pain or pressure in the face    Thick, colored drainage from the nose  Other symptoms may include:    Runny nose    Fluid draining from the nose down the throat (postnasal drip)    Headache    Cough    Pain    Fever  Diagnosing ABRS  ABRS may be diagnosed if you ve had an upper respiratory infection like a cold and cough for 10 or more days without improvement or with worsening symptoms. Your healthcare provider will ask about your symptoms and your medical history. The provider will check your vital signs, including your temperature. You ll have a physical exam. The healthcare provider will check your ears, nose, and throat. You likely won t need any tests. If ABRS comes back, you may have a culture or other tests.  Treatment for ABRS  Treatment may include:    Antibiotic medicine. This is for symptoms that last for at least 10 to 14 days.    Nasal corticosteroid medicine. Drops or spray used in the nose can lessen swelling and congestion.    Over-the-counter pain medicine. This is to lessen sinus pain and pressure.    Nasal decongestant medicine. Spray or drops may help to lessen congestion. Do not use them for more than a few days.    Salt wash (saline irrigation). This can help to loosen mucus.  Possible complications of ABRS  ABRS may come back or become long-term (chronic). In rare cases, ABRS may cause complications such as:     Inflamed tissue around the brain and spinal cord (meningitis)    Inflamed tissue around the eyes (orbital cellulitis)    Inflamed bones around the sinuses (osteitis)  These problems may need to be treated in a hospital with intravenous (IV) antibiotic medicine or surgery.  When to call the healthcare provider  Call your  healthcare provider if you have any of the following:    Symptoms that don t get better, or get worse    Symptoms that don t get better after 3 to 5 days on antibiotics    Trouble seeing    Swelling around your eyes    Confusion or trouble staying awake   Date Last Reviewed: 5/1/2017 2000-2017 [employer] [address] [place] [address]. All rights reserved. This information is not intended as a substitute for professional medical care. Always follow your healthcare professional's instructions.          Possible Causes of Dizziness or Fainting    Dizziness and fainting can have many causes. Below are some examples of possible causes your healthcare provider will look to rule out.  Benign paroxysmal positional vertigo (BPPV)  BPPV results when calcium crystals inside the inner ear shift into the wrong position. BPPV causes episodes of vertigo (a spinning sensation). Episodes most often happen when the head is moved in a certain way. This is more common in people 65 and older.   Infection or inflammation  The semicircular canals of the ear may become infected or inflamed. In this case, they can send the wrong balance signals. This can cause vertigo.  Meniere disease  Meniere disease happens when there is too much fluid in the semicircular canals. This can cause vertigo. It also can cause hearing problems and buzzing or ringing in the ears (called tinnitus). You may also have a feeling of pressure or fullness in the ear.  Syncope  Syncope is fainting that happens when the brain doesn t get enough oxygen-rich blood. It can be caused by low heart rate or low blood pressure. This is called vasovagal syncope. It can also be caused by sitting or standing up too quickly. This is called orthostatic hypotension. Syncope may also be due to a heart valve problem, an abnormal heart rhythm, or other heart problems. Dizziness can also happen from stroke, hemorrhage in the brain, or other problems in the  brain. Your healthcare provider may do certain tests to rule out these conditions.  Other causes  Other causes include:    Medicines. Certain medicines can cause dizziness and even fainting. In some cases, stopping a medicine too quickly can lead to withdrawal symptoms, including dizziness and fainting.    Anxiety. Being anxious can lead to breathing changes, such as hyperventilation. These can lead to dizziness and fainting.  Additional causes for dizziness and fainting also exist. Talk to your healthcare provider for more information.     Date Last Reviewed: 10/6/2015    [id number] [employer]. [address] [place] [address]. All rights reserved. This information is not intended as a substitute for professional medical care. Always follow your healthcare professional's instructions.      Push fluids, rest.  Levaquin ×14 days.  Return if not improving or worse.      24 Hour Appointment Hotline       To make an appointment at any [facility], call [phone] ([phone]). If you don't have a family doctor or clinic, we will help you find one. [facility] are conveniently located to serve the needs of you and your family.             Review of your medicines      START taking        Dose / Directions Last dose taken    levofloxacin 500 MG tablet   Commonly known as:  LEVAQUIN   Dose:  500 mg   Quantity:  14 tablet        Take 1 tablet (500 mg) by mouth daily for 14 days   Refills:  0          Our records show that you are taking the medicines listed below. If these are incorrect, please call your family doctor or clinic.        Dose / Directions Last dose taken    allopurinol 300 MG tablet   Commonly known as:  ZYLOPRIM   Quantity:  30 tablet        TAKE ONE TABLET BY MOUTH EVERY DAY   Refills:  1        IBUPROFEN PO   Dose:  800 mg        Take 800 mg by mouth every 8 hours as needed for moderate pain   Refills:  0        lisinopril 10 MG tablet   Commonly known as:   PRINIVIL/ZESTRIL   Quantity:  90 tablet        TAKE ONE TABLET BY MOUTH EVERY DAY   Refills:  3        TYLENOL PO   Dose:  1300 mg        Take 1,300 mg by mouth every 8 hours as needed for mild pain or fever   Refills:  0        vitamin D 2000 units tablet   Dose:  1 tablet        Take 1 tablet by mouth daily.   Refills:  0                Prescriptions were sent or printed at these locations (1 Prescription)                   [place], [place] - [address] [facility]   [address] [facility] [address]    Telephone:  [phone]   Fax:  [phone]   Hours:                  E-Prescribed (1 of 1)         levofloxacin (LEVAQUIN) 500 MG tablet                Procedures and tests performed during your visit     Basic metabolic panel    CBC with platelets differential    CRP inflammation    CT Head w/o Contrast    Carbon monoxide    EKG 12 lead    Troponin I      Orders Needing Specimen Collection     None      Pending Results     No orders found from 7/25/2018 to 7/28/2018.            Pending Culture Results     No orders found from 7/25/2018 to 7/28/2018.            Pending Results Instructions     If you had any lab results that were not finalized at the time of your Discharge, you can call the ED Lab Result RN at [phone]. You will be contacted by this team for any positive Lab results or changes in treatment. The nurses are available 7 days a week from 10A to 6:30P.  You can leave a message 24 hours per day and they will return your call.        Test Results From Your Hospital Stay        7/27/2018 12:44 PM      Component Results     Component Value Ref Range & Units Status    WBC 4.7 4.0 - 11.0 10e9/L Final    RBC Count 5.47 4.4 - 5.9 10e12/L Final    Hemoglobin 15.9 13.3 - 17.7 g/dL Final    Hematocrit 48.6 40.0 - 53.0 % Final    MCV 89 78 - 100 fl Final    MCH 29.1 26.5 - 33.0 pg Final    MCHC 32.7 31.5 - 36.5 g/dL Final    RDW 12.8 10.0 - 15.0 % Final    Platelet Count 198 150 - 450  10e9/L Final    Diff Method Automated Method  Final    % Neutrophils 52.3 % Final    % Lymphocytes 31.9 % Final    % Monocytes 11.8 % Final    % Eosinophils 2.7 % Final    % Basophils 1.1 % Final    % Immature Granulocytes 0.2 % Final    Nucleated RBCs 0 0 /100 Final    Absolute Neutrophil 2.5 1.6 - 8.3 10e9/L Final    Absolute Lymphocytes 1.5 0.8 - 5.3 10e9/L Final    Absolute Monocytes 0.6 0.0 - 1.3 10e9/L Final    Absolute Eosinophils 0.1 0.0 - 0.7 10e9/L Final    Absolute Basophils 0.1 0.0 - 0.2 10e9/L Final    Abs Immature Granulocytes 0.0 0 - 0.4 10e9/L Final    Absolute Nucleated RBC 0.0  Final         7/27/2018  1:01 PM      Component Results     Component Value Ref Range & Units Status    Sodium 139 133 - 144 mmol/L Final    Potassium 4.3 3.4 - 5.3 mmol/L Final    Chloride 110 (H) 94 - 109 mmol/L Final    Carbon Dioxide 24 20 - 32 mmol/L Final    Anion Gap 5 3 - 14 mmol/L Final    Glucose 111 (H) 70 - 99 mg/dL Final    Urea Nitrogen 13 7 - 30 mg/dL Final    Creatinine 0.97 0.66 - 1.25 mg/dL Final    GFR Estimate 82 >60 mL/min/1.7m2 Final    Non  GFR Calc    GFR Estimate If Black >90 >60 mL/min/1.7m2 Final    African American GFR Calc    Calcium 9.0 8.5 - 10.1 mg/dL Final         7/27/2018  1:01 PM      Component Results     Component Value Ref Range & Units Status    Troponin I ES <0.015 0.000 - 0.045 ug/L Final    The 99th percentile for upper reference range is 0.045 ug/L.  Troponin values   in the range of 0.045 - 0.120 ug/L may be associated with risks of adverse   clinical events.           7/27/2018  3:49 PM      Narrative     CT OF THE HEAD WITHOUT CONTRAST 7/27/2018 12:55 PM     COMPARISON: Brain MR 2/6/2011    HISTORY: Headache.     TECHNIQUE: Axial CT images of the head from the skull base to the  vertex were acquired without IV contrast.    FINDINGS: The ventricles and basal cisterns are within normal limits  in configuration. There is no midline shift. There are no  extra-axial  fluid collections. Gray-white differentiation is well maintained.    No intracranial hemorrhage, mass or recent infarct.    The visualized paranasal sinuses are well-aerated. There is no  mastoiditis. There are no fractures of the visualized bones.        Impression     IMPRESSION: Normal head CT.      Radiation dose for this scan was reduced using automated exposure  control, adjustment of the mA and/or kV according to patient size, or  iterative reconstruction technique.    MUNDO GREGORY MD         7/27/2018  2:28 PM      Component Results     Component Value Ref Range & Units Status    Carbon Monoxide 1.5 0 - 2 % Final         7/27/2018  1:02 PM      Component Results     Component Value Ref Range & Units Status    CRP Inflammation 19.9 (H) 0.0 - 8.0 mg/L Final                Thank you for choosing [facility]       Thank you for choosing [facility] for your care. Our goal is always to provide you with excellent care. Hearing back from our patients is one way we can continue to improve our services. Please take a few minutes to complete the written survey that you may receive in the mail after you visit with us. Thank you!        Care EveryWhere ID     This is your Care EveryWhere ID. This could be used by other organizations to access your [facility] medical records  [id number]        Equal Access to Services     ROMANA RODRIGES : Hadeugenie Cruz, yane jones, renee law, von talbert . So [facility] [phone].    ATENCIÓN: Si habla español, tiene a garcia disposición servicios gratuitos de asistencia lingüística. Llame al [phone].    We comply with applicable federal civil rights laws and [place] laws. We do not discriminate on the basis of race, color, national origin, age, disability, sex, sexual orientation, or gender identity.            After Visit Summary       This is your record. Keep this with you and show to your community  pharmacist(s) and doctor(s) at your next visit.

## 2018-07-27 NOTE — ED PROVIDER NOTES
"  History     Chief Complaint   Patient presents with     Generalized Weakness     near syncope-nausea-getting worse over 1 week   burch     HPI  Alen Gibbs is a 47 year old male, past medical history is significant for chondromalacia patellae right side, hypertension, tinea versicolor, hyperlipidemia, history of syncope and collapse, presents to the emergency department with concerns of near syncope episode associated with nausea and headache.  The patient was originally seen in clinic and transferred to the emergency department with concerns of the worst headache of life and worsening since arrival in clinic with 1 week of URI type symptoms including congestion, cough of greenish colored sputum with associated postnasal drip facial fullness and pressure.  The patient originally thought he probably had a sinus infection and was not eating and drinking well for the course of this week because of it.  Upon arrival in clinic he became near syncopal, nauseated, lightheaded and the headache worsened to the \"worst headache of life\".  There was no stress at onset no coughing, sneezing, defecation, i.e. no Valsalva type maneuver triggered the onset of the headache.      Problem List:    Patient Active Problem List    Diagnosis Date Noted     Chondromalacia of patella, right 01/08/2018     Priority: Medium     Essential hypertension with goal blood pressure less than 130/80 05/20/2016     Priority: Medium     Tinea versicolor 01/15/2016     Priority: Medium     CARDIOVASCULAR SCREENING; LDL GOAL LESS THAN 130 07/21/2015     Priority: Medium        Past Medical History:    Past Medical History:   Diagnosis Date     Hyperlipidemia      Syncope and collapse        Past Surgical History:    Past Surgical History:   Procedure Laterality Date     ARTHROTOMY WRIST Right 6/10/2016    Procedure: ARTHROTOMY WRIST;  Surgeon: Toni Ewing MD;  Location: [facility] OR     BONE MARROW ASPIRATION ONLY         Family History:    Family " History   Problem Relation Age of Onset     Breast Cancer Mother      Cardiovascular Father      CHF     Diabetes Father 66     Type II     Alzheimer Disease Maternal Grandmother      Respiratory Paternal Grandfather      emphysema     Blood Disease Brother 4     leukemia     Diabetes Daughter      type I       Social History:  Marital Status:   [2]  Social History   Substance Use Topics     Smoking status: Never Smoker     Smokeless tobacco: Never Used     Alcohol use Yes      Comment: occ        Medications:      Acetaminophen (TYLENOL PO)   allopurinol (ZYLOPRIM) 300 MG tablet   Cholecalciferol (VITAMIN D) 2000 UNIT tablet   IBUPROFEN PO   levofloxacin (LEVAQUIN) 500 MG tablet   lisinopril (PRINIVIL/ZESTRIL) 10 MG tablet         Review of Systems   All other systems reviewed and are negative.      Physical Exam   BP: (!) 128/100  Heart Rate: 64  Temp: 97.9  F (36.6  C)  Resp: 12  SpO2: 97 %      Physical Exam   Constitutional: He is oriented to person, place, and time. He appears well-developed and well-nourished.   HENT:   Head: Normocephalic and atraumatic.   Right Ear: External ear normal.   Left Ear: External ear normal.   Nose: Nose normal.   Mouth/Throat: Oropharynx is clear and moist.   Tenderness to palpation over the ethmoid/sphenoid area as well as left maxillary prominence.   Eyes: Conjunctivae and EOM are normal. Pupils are equal, round, and reactive to light.   Neck: Normal range of motion. Neck supple.   Cardiovascular: Normal rate, regular rhythm, normal heart sounds and intact distal pulses.    Pulmonary/Chest: Effort normal and breath sounds normal.   Abdominal: Soft. Bowel sounds are normal.   Musculoskeletal: Normal range of motion.   Neurological: He is alert and oriented to person, place, and time.   Skin: Skin is warm and dry.   Psychiatric: He has a normal mood and affect. His behavior is normal.   Nursing note and vitals reviewed.      ED Course     ED Course     Procedures              Results for orders placed or performed during the hospital encounter of 07/27/18   CT Head w/o Contrast    Narrative    CT OF THE HEAD WITHOUT CONTRAST 7/27/2018 12:55 PM     COMPARISON: Brain MR 2/6/2011    HISTORY: Headache.     TECHNIQUE: Axial CT images of the head from the skull base to the  vertex were acquired without IV contrast.    FINDINGS: The ventricles and basal cisterns are within normal limits  in configuration. There is no midline shift. There are no extra-axial  fluid collections. Gray-white differentiation is well maintained.    No intracranial hemorrhage, mass or recent infarct.    The visualized paranasal sinuses are well-aerated. There is no  mastoiditis. There are no fractures of the visualized bones.      Impression    IMPRESSION: Normal head CT.      Radiation dose for this scan was reduced using automated exposure  control, adjustment of the mA and/or kV according to patient size, or  iterative reconstruction technique.    MUNDO GREGORY MD     Labs Ordered and Resulted from Time of ED Arrival Up to the Time of Departure from the ED   BASIC METABOLIC PANEL - Abnormal; Notable for the following:        Result Value    Chloride 110 (*)     Glucose 111 (*)     All other components within normal limits   CRP INFLAMMATION - Abnormal; Notable for the following:     CRP Inflammation 19.9 (*)     All other components within normal limits   CBC WITH PLATELETS DIFFERENTIAL   TROPONIN I   CARBON MONOXIDE         Critical Care time:  none               No results found for this or any previous visit (from the past 24 hour(s)).    Medications   lactated ringers BOLUS 1,000 mL (0 mLs Intravenous Stopped 7/27/18 1354)   diphenhydrAMINE (BENADRYL) injection 25 mg (25 mg Intravenous Given 7/27/18 1300)   ondansetron (ZOFRAN) injection 4 mg (4 mg Intravenous Given 7/27/18 1302)       Assessments & Plan (with Medical Decision Making)   47-year-old male past medical history reviewed as above who presents  "with concerns of generalized weakness and a near syncopal episode while in clinic today with preceding 1 week of URI type symptoms including facial fullness congestion postnasal drip and greenish colored sputum with coughing and with blowing of nostrils for this time..  The patient admits to poor oral intake of fluids and feeling generally run down throughout the course of the week.  When he nearly passed out in his physician's office they sent him over to the emergency department.  He did not actually fall or injure himself.  It is noteworthy that while the patient describes the \"worst headache of life\" this came on gradually over the course of the week and was in the context of URI type symptoms and specifically not in the context of a Valsalva type maneuver.  Differential diagnostic considerations were reviewed with the patient prior to proceeding with workup which did include head CT which was read as normal per radiology.  CBC was unremarkable cardiac troponin, monoxide were both negative CRP was mildly elevated 19.9 BMP notable only for chloride of 110 and glucose of 111.  Otherwise normal.  The patient was treated with IV hydration and pain medication as detailed in the emergency medication record.  He experience satisfactory relief or at least improvement of his symptoms and requested disposition to home.  He was able to eat and drink prior to discharge.  In light of his preceding URI type sinusitis type symptoms over the preceding 1 week we discussed antibiotic therapy which was prescribed for him.  Repeatedly stressed the importance of good hydration and rest of the current context.  All questions were answered disposition to home.    Disclaimer: This note consists of symbols derived from keyboarding, dictation and/or voice recognition software. As a result, there may be errors in the script that have gone undetected. Please consider this when interpreting information found in this chart.      I have " reviewed the nursing notes.    I have reviewed the findings, diagnosis, plan and need for follow up with the patient.          Discharge Medication List as of 7/27/2018  4:19 PM      START taking these medications    Details   levofloxacin (LEVAQUIN) 500 MG tablet Take 1 tablet (500 mg) by mouth daily for 14 days, Disp-14 tablet, R-0, E-Prescribe             Final diagnoses:   Acute non-recurrent sinusitis, unspecified location   Dehydration   Near syncope       7/27/2018   [facility] EMERGENCY DEPARTMENT     Alen Burgess MD  07/29/18 5170

## 2018-07-27 NOTE — DISCHARGE INSTRUCTIONS
Dehydration (Adult)  Dehydration occurs when your body loses too much fluid. This may be the result of prolonged vomiting or diarrhea, excessive sweating, or a high fever. It may also happen if you don t drink enough fluid when you re sick or out in the heat. Misuse of diuretics (water pills) can also be a cause.  Symptoms include thirst and decreased urine output. You may also feel dizzy, weak, fatigued, or very drowsy. The diet described below is usually enough to treat dehydration. In some cases, you may need medicine.  Home care    Drink at least 12 8-ounce glasses of fluid every day to resolve the dehydration. Fluid may include water; orange juice; lemonade; apple, grape, or cranberry juice; clear fruit drinks; electrolyte replacement and sports drinks; and teas and coffee without caffeine. Don't drink alcohol. If you have been diagnosed with a kidney disease, ask your doctor how much and what types of fluids you should drink to prevent dehydration. If you have kidney disease, fluid can build up in the body. This can be dangerous to your health.    If you have a fever, muscle aches, or a headache as a result of a cold or flu, you may take acetaminophen or ibuprofen, unless another medicine was prescribed. If you have chronic liver or kidney disease, or have ever had a stomach ulcer or gastrointestinal bleeding, talk with your healthcare provider before using these medicines. Don't take aspirin if you are younger than 18 and have a fever. Aspirin raises the chance for severe liver injury.  Follow-up care  Follow up with your healthcare provider, or as advised.  When to seek medical advice  Call your healthcare provider right away if any of these occur:    Continued vomiting    Frequent diarrhea (more than 5 times a day); blood (red or black color) or mucus in diarrhea    Blood in vomit or stool    Swollen abdomen or increasing abdominal pain    Weakness, dizziness, or fainting    Unusual drowsiness or  confusion    Reduced urine output or extreme thirst    Fever of 100.4 F (38 C) or higher  Date Last Reviewed: 5/1/2017 2000-2017 [employer]. [address] [place] [address], [place] [address]. All rights reserved. This information is not intended as a substitute for professional medical care. Always follow your healthcare professional's instructions.          Acute Bacterial Rhinosinusitis (ABRS)    Acute bacterial rhinosinusitis (ABRS) is an infection of your nasal cavity and sinuses. It s caused by bacteria. Acute means that you ve had symptoms for less than 4 weeks, but possibly up to 12 weeks.  Understanding your sinuses  The nasal cavity is the large air-filled space behind your nose. The sinuses are a group of spaces formed by the bones of your face. They connect with your nasal cavity. ABRS causes the tissue lining these spaces to become inflamed. Mucus may not drain normally. This leads to facial pain and other symptoms.  What causes ABRS?  ABRS most often follows an upper respiratory infection caused by a virus. Bacteria then infect the lining of your nasal cavity and sinuses. But you can also get ABRS if you have:    Nasal allergies    Long-term nasal swelling and congestion not caused by allergies    Blockage in the nose  Symptoms of ABRS  The symptoms of ABRS may be different for each person and include:    Nasal congestion or blockage    Pain or pressure in the face    Thick, colored drainage from the nose  Other symptoms may include:    Runny nose    Fluid draining from the nose down the throat (postnasal drip)    Headache    Cough    Pain    Fever  Diagnosing ABRS  ABRS may be diagnosed if you ve had an upper respiratory infection like a cold and cough for 10 or more days without improvement or with worsening symptoms. Your healthcare provider will ask about your symptoms and your medical history. The provider will check your vital signs, including your temperature. You ll have a physical  exam. The healthcare provider will check your ears, nose, and throat. You likely won t need any tests. If ABRS comes back, you may have a culture or other tests.  Treatment for ABRS  Treatment may include:    Antibiotic medicine. This is for symptoms that last for at least 10 to 14 days.    Nasal corticosteroid medicine. Drops or spray used in the nose can lessen swelling and congestion.    Over-the-counter pain medicine. This is to lessen sinus pain and pressure.    Nasal decongestant medicine. Spray or drops may help to lessen congestion. Do not use them for more than a few days.    Salt wash (saline irrigation). This can help to loosen mucus.  Possible complications of ABRS  ABRS may come back or become long-term (chronic). In rare cases, ABRS may cause complications such as:     Inflamed tissue around the brain and spinal cord (meningitis)    Inflamed tissue around the eyes (orbital cellulitis)    Inflamed bones around the sinuses (osteitis)  These problems may need to be treated in a hospital with intravenous (IV) antibiotic medicine or surgery.  When to call the healthcare provider  Call your healthcare provider if you have any of the following:    Symptoms that don t get better, or get worse    Symptoms that don t get better after 3 to 5 days on antibiotics    Trouble seeing    Swelling around your eyes    Confusion or trouble staying awake   Date Last Reviewed: 5/1/2017 2000-2017 [employer]. [address] [place] [address], [place] [address]. All rights reserved. This information is not intended as a substitute for professional medical care. Always follow your healthcare professional's instructions.          Possible Causes of Dizziness or Fainting    Dizziness and fainting can have many causes. Below are some examples of possible causes your healthcare provider will look to rule out.  Benign paroxysmal positional vertigo (BPPV)  BPPV results when calcium crystals inside the inner ear shift into the  wrong position. BPPV causes episodes of vertigo (a spinning sensation). Episodes most often happen when the head is moved in a certain way. This is more common in people 65 and older.   Infection or inflammation  The semicircular canals of the ear may become infected or inflamed. In this case, they can send the wrong balance signals. This can cause vertigo.  Meniere disease  Meniere disease happens when there is too much fluid in the semicircular canals. This can cause vertigo. It also can cause hearing problems and buzzing or ringing in the ears (called tinnitus). You may also have a feeling of pressure or fullness in the ear.  Syncope  Syncope is fainting that happens when the brain doesn t get enough oxygen-rich blood. It can be caused by low heart rate or low blood pressure. This is called vasovagal syncope. It can also be caused by sitting or standing up too quickly. This is called orthostatic hypotension. Syncope may also be due to a heart valve problem, an abnormal heart rhythm, or other heart problems. Dizziness can also happen from stroke, hemorrhage in the brain, or other problems in the brain. Your healthcare provider may do certain tests to rule out these conditions.  Other causes  Other causes include:    Medicines. Certain medicines can cause dizziness and even fainting. In some cases, stopping a medicine too quickly can lead to withdrawal symptoms, including dizziness and fainting.    Anxiety. Being anxious can lead to breathing changes, such as hyperventilation. These can lead to dizziness and fainting.  Additional causes for dizziness and fainting also exist. Talk to your healthcare provider for more information.     Date Last Reviewed: 10/6/2015    5354-6572 [employer]. [address] [place] [address], [place] [address]. All rights reserved. This information is not intended as a substitute for professional medical care. Always follow your healthcare professional's instructions.      Push  fluids, rest.  Levaquin ×14 days.  Return if not improving or worse.

## 2018-07-27 NOTE — ED NOTES
"Pt was evaluated in the clinic and sent to ED.  Ongoing HA for one week, nausea no emesis, pale, diaphoretic in triage denies chest pain. Feels \" like I'm going to pass out \" labs sent  "

## 2018-07-27 NOTE — MR AVS SNAPSHOT
After Visit Summary   7/27/2018    Alen Gibbs    MRN: [id number]           Patient Information     Date Of Birth          1970        Visit Information        Provider Department      7/27/2018 11:20 AM Crystal Gutierrez APRN CNP [facility]        Today's Diagnoses     Weakness    -  1    Dizziness        Severe headache           Follow-ups after your visit        Who to contact     If you have questions or need follow up information about today's clinic visit or your schedule please contact [facility] directly at [phone].  Normal or non-critical lab and imaging results will be communicated to you by MyChart, letter or phone within 4 business days after the clinic has received the results. If you do not hear from us within 7 days, please contact the clinic through MyChart or phone. If you have a critical or abnormal lab result, we will notify you by phone as soon as possible.  Submit refill requests through [employer] or call your pharmacy and they will forward the refill request to us. Please allow 3 business days for your refill to be completed.          Additional Information About Your Visit        Care EveryWhere ID     This is your Care EveryWhere ID. This could be used by other organizations to access your [place] medical records  [id number]        Your Vitals Were     Pulse Oximetry                   96%            Blood Pressure from Last 3 Encounters:   07/27/18 (P) 132/78   01/08/18 110/82   12/18/17 137/79    Weight from Last 3 Encounters:   07/27/18 (P) 267 lb 9.6 oz (121.4 kg)   01/08/18 274 lb (124.3 kg)   12/18/17 269 lb (122 kg)              Today, you had the following     No orders found for display       Primary Care Provider Office Phone # Fax #    Nito Pruitt [phone] [address] [facility] [address]        Equal Access to Services     ROMANA RODRIGES : yane Sandoval  renee jonesmajoyce cisnerosvon dobbs. So [facility] [phone].    ATENCIÓN: Si irina bell, tiene a garcia disposición servicios gratuitos de asistencia lingüística. Delores al [phone].    We comply with applicable federal civil rights laws and [place] laws. We do not discriminate on the basis of race, color, national origin, age, disability, sex, sexual orientation, or gender identity.            Thank you!     Thank you for choosing [facility]  for your care. Our goal is always to provide you with excellent care. Hearing back from our patients is one way we can continue to improve our services. Please take a few minutes to complete the written survey that you may receive in the mail after your visit with us. Thank you!             Your Updated Medication List - Protect others around you: Learn how to safely use, store and throw away your medicines at www.disposemymeds.org.          This list is accurate as of 7/27/18 12:25 PM.  Always use your most recent med list.                   Brand Name Dispense Instructions for use Diagnosis    allopurinol 300 MG tablet    ZYLOPRIM    30 tablet    TAKE ONE TABLET BY MOUTH EVERY DAY    Acute gouty arthritis       fluconazole 150 MG tablet    DIFLUCAN    5 tablet    Take 1 tablet (150 mg) by mouth daily    Tinea versicolor       lisinopril 10 MG tablet    PRINIVIL/ZESTRIL    90 tablet    TAKE ONE TABLET BY MOUTH EVERY DAY    Essential hypertension with goal blood pressure less than 130/80       vitamin D 2000 units tablet      Take 1 tablet by mouth daily.

## 2018-07-27 NOTE — ED AVS SNAPSHOT
[facility] Emergency Department    [address] [facility] [address]    Phone:  [phone]    Fax:  [phone]                                       Alen Gibbs   MRN: [id number]    Department:  [facility] Emergency Department   Date of Visit:  7/27/2018           After Visit Summary Signature Page     I have received my discharge instructions, and my questions have been answered. I have discussed any challenges I see with this plan with the nurse or doctor.    ..........................................................................................................................................  Patient/Patient Representative Signature      ..........................................................................................................................................  Patient Representative Print Name and Relationship to Patient    ..................................................               ................................................  Date                                            Time    ..........................................................................................................................................  Reviewed by Signature/Title    ...................................................              ..............................................  Date                                                            Time

## 2018-07-27 NOTE — PROGRESS NOTES
"  SUBJECTIVE:   Alen Gibbs is a 47 year old male who presents to clinic today for the following health issues:      ENT Symptoms             Symptoms: cc Present Absent Comment   Fever/Chills   x    Fatigue  x     Muscle Aches   x    Eye Irritation  x     Sneezing  x     Nasal Ace/Drg  x     Sinus Pressure/Pain x x     Loss of smell  x     Dental pain  x  Left side pain    Sore Throat   x    Swollen Glands   x    Ear Pain/Fullness  x     Cough  x     Wheeze  x     Chest Pain   x    Shortness of breath   x    Rash   x    Other x   headache     Symptom duration:  07/22/18   Symptom severity:  severe with the headache, missed work once this week   Treatments tried:  ibuprofen, mucinex   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  However once he got here, he began feeling worse.  Doesn't often get headaches - this is the worst headache of his life - pain is severe.  Feels very weak and dizzy.  Feels like he is going to pass out.      Problem list and histories reviewed & adjusted, as indicated.  Additional history: as documented      Reviewed and updated as needed this visit by clinical staff       Reviewed and updated as needed this visit by Provider         ROS:  Constitutional, HEENT, cardiovascular, pulmonary, gi and gu systems are negative, except as otherwise noted.    OBJECTIVE:     BP (P) 132/78  Pulse (P) 86  Temp (P) 97.1  F (36.2  C) (Tympanic)  Ht (P) 5' 11\" (1.803 m)  Wt (P) 267 lb 9.6 oz (121.4 kg)  SpO2 96%  BMI (P) 37.32 kg/m2  Body mass index is 37.32 kg/(m^2) (pended).  GENERAL: Alert, mild distress.  Patient has eyes closed, talking slow, diaphoretic.        ASSESSMENT/PLAN:       ICD-10-CM    1. Weakness R53.1    2. Dizziness R42    3. Severe headache R51      Patient initially made appointment today to be evaluated for a sinus infection.  However once he got here, he was c/o worst HA of his " life, dizziness, weakness and feelings of impending syncope.  He appears ill, diaphoretic, speech is slow, can't open eyes due to the pain.  Sent to ER for evaluation. Report given to RN.    The risks, benefits and treatment options of prescribed medications or other treatments have been discussed with the patient. The patient verbalized their understanding and should call or follow up if no improvement or if they develop further problems.    [facility]

## 2018-08-18 ENCOUNTER — HOSPITAL ENCOUNTER (EMERGENCY)
Facility: CLINIC | Age: 48
Discharge: HOME | End: 2018-08-18
Attending: PHYSICIAN ASSISTANT | Admitting: PHYSICIAN ASSISTANT
Payer: COMMERCIAL

## 2018-08-18 VITALS
SYSTOLIC BLOOD PRESSURE: 163 MMHG | HEIGHT: 71 IN | RESPIRATION RATE: 16 BRPM | DIASTOLIC BLOOD PRESSURE: 79 MMHG | OXYGEN SATURATION: 95 % | TEMPERATURE: 98.3 F | HEART RATE: 103 BPM

## 2018-08-18 DIAGNOSIS — S61.012A THUMB LACERATION, LEFT, INITIAL ENCOUNTER: Primary | ICD-10-CM

## 2018-08-18 PROCEDURE — 99213 OFFICE O/P EST LOW 20 MIN: CPT | Mod: 25 | Performed by: PHYSICIAN ASSISTANT

## 2018-08-18 PROCEDURE — 12001 RPR S/N/AX/GEN/TRNK 2.5CM/<: CPT

## 2018-08-18 PROCEDURE — 12001 RPR S/N/AX/GEN/TRNK 2.5CM/<: CPT | Performed by: PHYSICIAN ASSISTANT

## 2018-08-18 PROCEDURE — G0463 HOSPITAL OUTPT CLINIC VISIT: HCPCS | Mod: 25

## 2018-08-18 NOTE — ED AVS SNAPSHOT
[facility] Emergency Department    [address] [facility] [address]    Phone:  [phone]    Fax:  [phone]                                       Alen Gibbs   MRN: [id number]    Department:  [facility] Emergency Department   Date of Visit:  8/18/2018           Patient Information     Date Of Birth          1970        Your diagnoses for this visit were:     Thumb laceration, left, initial encounter        You were seen by Joie Cartwright PA-C.      Follow-up Information     Follow up with Nito Pruitt MD. Call in 1 week.    Specialty:  Family Practice    Why:  For suture removal    Contact information:    [address] [place] [address] [id number] [phone]          Follow up with [facility] Emergency Department.    Specialty:  EMERGENCY MEDICINE    Why:  As needed, If symptoms worsen    Contact information:    [address] [place] [id number] [phone]    Additional information:    The medical center is located at   [address] [place] [address] (between [facility] and   [facility] in [place], four miles north   of [place]).      24 Hour Appointment Hotline       To make an appointment at any [place] clinic, call [phone] ([phone]). If you don't have a family doctor or clinic, we will help you find one. [place] clinics are conveniently located to serve the needs of you and your family.             Review of your medicines      Our records show that you are taking the medicines listed below. If these are incorrect, please call your family doctor or clinic.        Dose / Directions Last dose taken    allopurinol 300 MG tablet   Commonly known as:  ZYLOPRIM   Quantity:  30 tablet        TAKE ONE TABLET BY MOUTH EVERY DAY   Refills:  1        IBUPROFEN PO   Dose:  800 mg        Take 800 mg by mouth every 8 hours as needed for moderate pain   Refills:  0        lisinopril 10 MG tablet   Commonly known as:  PRINIVIL/ZESTRIL   Quantity:  90 tablet         TAKE ONE TABLET BY MOUTH EVERY DAY   Refills:  3        TYLENOL PO   Dose:  1300 mg        Take 1,300 mg by mouth every 8 hours as needed for mild pain or fever   Refills:  0        vitamin D 2000 units tablet   Dose:  1 tablet        Take 1 tablet by mouth daily.   Refills:  0                Orders Needing Specimen Collection     None      Pending Results     No orders found from 8/16/2018 to 8/19/2018.            Pending Culture Results     No orders found from 8/16/2018 to 8/19/2018.            Pending Results Instructions     If you had any lab results that were not finalized at the time of your Discharge, you can call the ED Lab Result RN at [phone]. You will be contacted by this team for any positive Lab results or changes in treatment. The nurses are available 7 days a week from 10A to 6:30P.  You can leave a message 24 hours per day and they will return your call.        Test Results From Your Hospital Stay               Thank you for choosing [facility]       Thank you for choosing [facility] for your care. Our goal is always to provide you with excellent care. Hearing back from our patients is one way we can continue to improve our services. Please take a few minutes to complete the written survey that you may receive in the mail after you visit with us. Thank you!        Care EveryWhere ID     This is your Care EveryWhere ID. This could be used by other organizations to access your [facility] medical records  [id number]        Equal Access to Services     ROMANA RODRIGES AH: Jessica Cruz, waniida luqadaha, qaybta kaaljaney law, von delcid. So [facility] [phone].    ATENCIÓN: Si habla español, tiene a garcia disposición servicios gratuitos de asistencia lingüística. Llame al [phone].    We comply with applicable federal civil rights laws and [place] laws. We do not discriminate on the basis of race, color, national origin, age, disability, sex, sexual  orientation, or gender identity.            After Visit Summary       This is your record. Keep this with you and show to your community pharmacist(s) and doctor(s) at your next visit.

## 2018-08-18 NOTE — ED AVS SNAPSHOT
[facility] Emergency Department    [address] [facility] [address]    Phone:  [phone]    Fax:  [phone]                                       Alen Gibbs   MRN: [id number]    Department:  [facility] Emergency Department   Date of Visit:  8/18/2018           After Visit Summary Signature Page     I have received my discharge instructions, and my questions have been answered. I have discussed any challenges I see with this plan with the nurse or doctor.    ..........................................................................................................................................  Patient/Patient Representative Signature      ..........................................................................................................................................  Patient Representative Print Name and Relationship to Patient    ..................................................               ................................................  Date                                            Time    ..........................................................................................................................................  Reviewed by Signature/Title    ...................................................              ..............................................  Date                                                            Time

## 2018-08-19 ASSESSMENT — ENCOUNTER SYMPTOMS
NEUROLOGICAL NEGATIVE: 1
CONSTITUTIONAL NEGATIVE: 1
WOUND: 1
MUSCULOSKELETAL NEGATIVE: 1

## 2018-08-19 NOTE — ED PROVIDER NOTES
History     Chief Complaint   Patient presents with     Laceration     HPI  Alen Gibbs is a 47 year old male who presents with complaints of left thumb laceration today.  Pt states he was preparing dinner when he accidentally cut his left thumb with a knife.  This occurred just prior to arrival.  Bleeding is controlled.  He is moving his thumb without difficulties.  Last Tetanus was in 2010.      Problem List:    Patient Active Problem List    Diagnosis Date Noted     Chondromalacia of patella, right 01/08/2018     Priority: Medium     Essential hypertension with goal blood pressure less than 130/80 05/20/2016     Priority: Medium     Tinea versicolor 01/15/2016     Priority: Medium     CARDIOVASCULAR SCREENING; LDL GOAL LESS THAN 130 07/21/2015     Priority: Medium        Past Medical History:    Past Medical History:   Diagnosis Date     Hyperlipidemia      Syncope and collapse        Past Surgical History:    Past Surgical History:   Procedure Laterality Date     ARTHROTOMY WRIST Right 6/10/2016    Procedure: ARTHROTOMY WRIST;  Surgeon: Toni Ewing MD;  Location: [facility] OR     BONE MARROW ASPIRATION ONLY         Family History:    Family History   Problem Relation Age of Onset     Breast Cancer Mother      Cardiovascular Father      CHF     Diabetes Father 66     Type II     Alzheimer Disease Maternal Grandmother      Respiratory Paternal Grandfather      emphysema     Blood Disease Brother 4     leukemia     Diabetes Daughter      type I       Social History:  Marital Status:   [2]  Social History   Substance Use Topics     Smoking status: Never Smoker     Smokeless tobacco: Never Used     Alcohol use Yes      Comment: occ        Medications:      Acetaminophen (TYLENOL PO)   allopurinol (ZYLOPRIM) 300 MG tablet   Cholecalciferol (VITAMIN D) 2000 UNIT tablet   IBUPROFEN PO   lisinopril (PRINIVIL/ZESTRIL) 10 MG tablet         Review of Systems   Constitutional: Negative.    Musculoskeletal:  "Negative.    Skin: Positive for wound.   Neurological: Negative.    All other systems reviewed and are negative.      Physical Exam   BP: 163/79  Pulse: 103  Temp: 98.3  F (36.8  C)  Resp: 16  Height: 180.3 cm (5' 11\")  SpO2: 95 %      Physical Exam   Constitutional: He appears well-developed and well-nourished. No distress.   HENT:   Head: Normocephalic and atraumatic.   Cardiovascular: Intact distal pulses.    Pulmonary/Chest: Effort normal.   Musculoskeletal:        Left hand: He exhibits laceration. He exhibits normal range of motion, no tenderness, normal capillary refill, no deformity and no swelling. Normal sensation noted. Normal strength noted.   1 cm linear laceration to distal left thumb.  No tendon involvement.  Full active and passive ROM of thumb at all joints.  Full strength.  Sensation intact.   Neurological: He is alert. He has normal strength. No sensory deficit.   Skin: Skin is warm and dry.       ED Course     ED Course     Laceration repair  Date/Time: 8/18/2018 8:54 PM  Performed by: SONU SORIANO  Authorized by: SONU SORIANO   Consent: Verbal consent obtained.  Risks and benefits: risks, benefits and alternatives were discussed  Consent given by: patient  Patient understanding: patient states understanding of the procedure being performed  Patient identity confirmed: verbally with patient  Body area: upper extremity  Location details: left thumb  Laceration length: 1 cm  Foreign bodies: no foreign bodies  Tendon involvement: none  Nerve involvement: none  Anesthesia: local infiltration    Anesthesia:  Local Anesthetic: lidocaine 1% without epinephrine  Preparation: Patient was prepped and draped in the usual sterile fashion.  Irrigation solution: saline  Irrigation method: syringe  Amount of cleaning: standard  Debridement: none  Degree of undermining: none  Skin closure: 5-0 nylon  Number of sutures: 2  Technique: simple  Approximation: close  Approximation difficulty: " simple  Dressing: antibiotic ointment  Patient tolerance: Patient tolerated the procedure well with no immediate complications          No results found for this or any previous visit (from the past 24 hour(s)).    Medications - No data to display    Assessments & Plan (with Medical Decision Making)     Pt is a 47 year old male who presents with complaints of left thumb laceration today.  Pt states he was preparing dinner when he accidentally cut his left thumb with a knife.  This occurred just prior to arrival.  Tetanus is up-to-date.  Pt is afebrile on arrival.  Exam as above.  Laceration was cleaned and repaired (see above procedure note).  Return precautions were reviewed.  Hand-outs were provided.    Patient was instructed to follow-up with PCP in 7-10 days for suture removal and for continued care and management or sooner if new or worsening symptoms.  He is to return to the ED for persistent and/or worsening symptoms.  Patient expressed understanding of the diagnosis and plan and was discharged home in good condition.    I have reviewed the nursing notes.    I have reviewed the findings, diagnosis, plan and need for follow up with the patient.    Discharge Medication List as of 8/18/2018  8:41 PM          Final diagnoses:   Thumb laceration, left, initial encounter       8/18/2018   [facility] EMERGENCY DEPARTMENT     Joie Cartwright PA-C  08/19/18 [id number]

## 2018-10-10 DIAGNOSIS — M10.9 ACUTE GOUTY ARTHRITIS: ICD-10-CM

## 2018-10-10 RX ORDER — ALLOPURINOL 300 MG/1
TABLET ORAL
Qty: 30 TABLET | Refills: 1 | Status: SHIPPED | OUTPATIENT
Start: 2018-10-10 | End: 2019-01-18

## 2018-10-10 NOTE — TELEPHONE ENCOUNTER
"Requested Prescriptions   Pending Prescriptions Disp Refills     allopurinol (ZYLOPRIM) 300 MG tablet [Pharmacy Med Name: ALLOPURINOL 300MG TABS] 30 tablet 1     Sig: TAKE ONE TABLET BY MOUTH EVERY DAY    Gout Agents Protocol Failed    10/10/2018  4:28 PM       Failed - ALT on file in past 12 months    Recent Labs   Lab Test  07/16/15   0902   ALT  33            Failed - Has Uric Acid on file in past 12 months and value is less than 6    Recent Labs   Lab Test  05/10/17   1239   URIC  8.0*     If level is 6mg/dL or greater, ok to refill one time and refer to provider.          Passed - CBC on file in past 12 months    Recent Labs   Lab Test  07/27/18   1220   WBC  4.7   RBC  5.47   HGB  15.9   HCT  48.6   PLT  198       For [facility] ONLY: NBBB540 = WBC, IOFU574 = RBC         Passed - Recent (12 mo) or future (30 days) visit within the authorizing provider's specialty    Patient had office visit in the last 12 months or has a visit in the next 30 days with authorizing provider or within the authorizing provider's specialty.  See \"Patient Info\" tab in inbasket, or \"Choose Columns\" in Meds & Orders section of the refill encounter.           Passed - Patient is age 18 or older       Passed - Normal serum creatinine on file in the past 12 months    Recent Labs   Lab Test  07/27/18   1220   CR  0.97             Routing refill request to provider for review/approval because:  Labs not current:  See above.   Shawanda Land RNC          "

## 2018-10-17 ENCOUNTER — TELEPHONE (OUTPATIENT)
Dept: FAMILY MEDICINE | Facility: CLINIC | Age: 48
End: 2018-10-17

## 2018-10-17 DIAGNOSIS — I10 ESSENTIAL HYPERTENSION WITH GOAL BLOOD PRESSURE LESS THAN 130/80: ICD-10-CM

## 2018-10-17 RX ORDER — LISINOPRIL 10 MG/1
TABLET ORAL
Qty: 90 TABLET | Refills: 3 | Status: SHIPPED | OUTPATIENT
Start: 2018-10-17 | End: 2019-03-01

## 2018-10-17 NOTE — TELEPHONE ENCOUNTER
Please notify prescription sent to pharmacy.  Can he do an RN BP visit in the clinic?   He only failed when he was in the ED here in August.   Nito Pruitt

## 2018-10-17 NOTE — TELEPHONE ENCOUNTER
"Requested Prescriptions   Pending Prescriptions Disp Refills     lisinopril (PRINIVIL/ZESTRIL) 10 MG tablet [Pharmacy Med Name: LISINOPRIL 10MG TABS]  Last Written Prescription Date:  08/07/2017  Last Fill Quantity: 90,  # refills: 3   Last office visit: 7/27/2018 with prescribing provider:  mouna   Future Office Visit:     90 tablet 3     Sig: TAKE ONE TABLET BY MOUTH EVERY DAY    ACE Inhibitors (Including Combos) Protocol Failed    10/17/2018 12:30 PM       Failed - Blood pressure under 140/90 in past 12 months    BP Readings from Last 3 Encounters:   08/18/18 163/79   07/27/18 133/88   07/27/18 (P) 132/78                Passed - Recent (12 mo) or future (30 days) visit within the authorizing provider's specialty    Patient had office visit in the last 12 months or has a visit in the next 30 days with authorizing provider or within the authorizing provider's specialty.  See \"Patient Info\" tab in inbasket, or \"Choose Columns\" in Meds & Orders section of the refill encounter.             Passed - Patient is age 18 or older       Passed - Normal serum creatinine on file in past 12 months    Recent Labs   Lab Test  07/27/18   1220   CR  0.97            Passed - Normal serum potassium on file in past 12 months    Recent Labs   Lab Test  07/27/18   1220   POTASSIUM  4.3             Carlos Martins RT (R)    "

## 2018-10-17 NOTE — TELEPHONE ENCOUNTER
Routing refill request to provider for review/approval because:  Last BP not at goal.     BP Readings from Last 3 Encounters:   08/18/18 163/79   07/27/18 133/88   07/27/18 (P) 132/78      Aminata PATEL RN

## 2018-10-18 NOTE — TELEPHONE ENCOUNTER
Message left for patient to return call to clinic.  CSS - ok to deliver message below.    Cari DAVISON RN

## 2018-12-13 ENCOUNTER — OFFICE VISIT (OUTPATIENT)
Dept: OTOLARYNGOLOGY | Facility: CLINIC | Age: 48
End: 2018-12-13
Payer: COMMERCIAL

## 2018-12-13 ENCOUNTER — OFFICE VISIT (OUTPATIENT)
Dept: AUDIOLOGY | Facility: CLINIC | Age: 48
End: 2018-12-13
Payer: COMMERCIAL

## 2018-12-13 VITALS
DIASTOLIC BLOOD PRESSURE: 76 MMHG | BODY MASS INDEX: 37.8 KG/M2 | HEIGHT: 71 IN | SYSTOLIC BLOOD PRESSURE: 131 MMHG | WEIGHT: 270 LBS | HEART RATE: 78 BPM | TEMPERATURE: 97.8 F

## 2018-12-13 DIAGNOSIS — H90.3 SENSORINEURAL HEARING LOSS, BILATERAL: Primary | ICD-10-CM

## 2018-12-13 DIAGNOSIS — H93.13 TINNITUS, BILATERAL: Primary | ICD-10-CM

## 2018-12-13 PROCEDURE — 99203 OFFICE O/P NEW LOW 30 MIN: CPT | Performed by: OTOLARYNGOLOGY

## 2018-12-13 PROCEDURE — 92552 PURE TONE AUDIOMETRY AIR: CPT | Performed by: AUDIOLOGIST

## 2018-12-13 PROCEDURE — 92567 TYMPANOMETRY: CPT | Performed by: AUDIOLOGIST

## 2018-12-13 PROCEDURE — 92556 SPEECH AUDIOMETRY COMPLETE: CPT | Performed by: AUDIOLOGIST

## 2018-12-13 PROCEDURE — 99207 ZZC NO CHARGE LOS: CPT | Performed by: AUDIOLOGIST

## 2018-12-13 ASSESSMENT — MIFFLIN-ST. JEOR: SCORE: 2116.84

## 2018-12-13 ASSESSMENT — PAIN SCALES - GENERAL: PAINLEVEL: NO PAIN (0)

## 2018-12-13 NOTE — NURSING NOTE
"Initial /76 (BP Location: Right arm, Patient Position: Sitting, Cuff Size: Adult Regular)   Pulse 78   Temp 97.8  F (36.6  C) (Oral)   Ht 1.803 m (5' 11\")   Wt 122.5 kg (270 lb)   BMI 37.66 kg/m   Estimated body mass index is 37.66 kg/m  as calculated from the following:    Height as of this encounter: 1.803 m (5' 11\").    Weight as of this encounter: 122.5 kg (270 lb). .    Dayan Watt CMA    "

## 2018-12-13 NOTE — PROGRESS NOTES
AUDIOLOGY REPORT:    Patient was referred from ENT by Dr. Gallardo for audiology evaluation. Patient reports that his wife is concerned that he is not hearing well. This has been a concern for the past 1-2 years. Patient reports occasional bilateral tinnitus. He denies otalgia and aural fullness.     Testing:    Otoscopy:   Otoscopic exam indicates ears are clear of cerumen bilaterally     Tympanograms:    RIGHT: normal eardrum mobility     LEFT:   normal eardrum mobility    Thresholds:   Pure Tone Thresholds assessed using conventional audiometry with good reliability from 250-8000 Hz bilaterally using circumaural headphones     RIGHT:  normal hearing sensitivity at all frequencies tested     LEFT:    normal hearing sensitivity at all frequencies tested     Speech Reception Threshold:    RIGHT: 5 dB HL    LEFT:   5 dB HL  Results are in agreement with pure tone average.     Word Recognition Score:     RIGHT: 100% at 50 dB HL using NU-6 recorded word list.    LEFT:   100% at 50 dB HL using NU-6 recorded word list.    Discussed results with the patient.     Patient was returned to ENT for follow up.     Barbara Posadas, CCC-A  Licensed Audiologist #[id number]  12/13/2018

## 2018-12-13 NOTE — LETTER
12/13/2018         RE: Alen Gibbs  [address] [facility] [id number]        Dear Colleague,    Thank you for referring your patient, Alen Gibbs, to the [facility]. Please see a copy of my visit note below.    History of Present Illness - Alen Gibbs is a 47 year old male with complaints of hearing loss. Patient reports his significant other believes he is not hearing well. The patient himself has not noticed any hearing loss, but was concerned about tinnitus. Patient was in the , and does typically wear hearing protection while shooting firearms.     Past Medical History -   Patient Active Problem List   Diagnosis     CARDIOVASCULAR SCREENING; LDL GOAL LESS THAN 130     Tinea versicolor     Essential hypertension with goal blood pressure less than 130/80     Chondromalacia of patella, right       Current Medications -   Current Outpatient Medications:      Acetaminophen (TYLENOL PO), Take 1,300 mg by mouth every 8 hours as needed for mild pain or fever, Disp: , Rfl:      Cholecalciferol (VITAMIN D) 2000 UNIT tablet, Take 1 tablet by mouth daily., Disp: , Rfl:      lisinopril (PRINIVIL/ZESTRIL) 10 MG tablet, TAKE ONE TABLET BY MOUTH EVERY DAY, Disp: 90 tablet, Rfl: 3     allopurinol (ZYLOPRIM) 300 MG tablet, TAKE ONE TABLET BY MOUTH EVERY DAY (Patient not taking: Reported on 12/13/2018), Disp: 30 tablet, Rfl: 1     IBUPROFEN PO, Take 800 mg by mouth every 8 hours as needed for moderate pain, Disp: , Rfl:     Allergies -   Allergies   Allergen Reactions     Norco [Hydrocodone-Acetaminophen] Nausea     Simvastatin Other (See Comments)     fasciatus       Social History -   Social History     Socioeconomic History     Marital status:      Spouse name: Not on file     Number of children: Not on file     Years of education: Not on file     Highest education level: Not on file   Social Needs     Financial resource strain: Not on file     Food insecurity - worry: Not on  "file     Food insecurity - inability: Not on file     Transportation needs - medical: Not on file     Transportation needs - non-medical: Not on file   Occupational History     Not on file   Tobacco Use     Smoking status: Never Smoker     Smokeless tobacco: Never Used   Substance and Sexual Activity     Alcohol use: Yes     Comment: occ     Drug use: No     Sexual activity: Yes     Partners: Female   Other Topics Concern     Parent/sibling w/ CABG, MI or angioplasty before 65F 55M? No   Social History Narrative     Not on file       Family History -   Family History   Problem Relation Age of Onset     Breast Cancer Mother      Cardiovascular Father         CHF     Diabetes Father 66        Type II     Alzheimer Disease Maternal Grandmother      Respiratory Paternal Grandfather         emphysema     Blood Disease Brother 4        leukemia     Diabetes Daughter         type I       Review of Systems - As per HPI and PMHx, otherwise 7 system review of the head and neck negative. 10+ system review negative.    Physical Exam  /76 (BP Location: Right arm, Patient Position: Sitting, Cuff Size: Adult Regular)   Pulse 78   Temp 97.8  F (36.6  C) (Oral)   Ht 1.803 m (5' 11\")   Wt 122.5 kg (270 lb)   BMI 37.66 kg/m     General - The patient is well nourished and well developed, and appears to have good nutritional status.  Alert and oriented to person and place, answers questions and cooperates with examination appropriately.   Head and Face - Normocephalic and atraumatic, with no gross asymmetry noted of the contour of the facial features.  The facial nerve is intact, with strong symmetric movements.  Voice and Breathing - The patient was breathing comfortably without the use of accessory muscles. There was no wheezing, stridor, or stertor.  The patients voice was clear and strong, and had appropriate pitch and quality.  Ears - Bilateral pinna and EACs with normal appearing overlying skin. Tympanic membrane intact " with good mobility on pneumatic otoscopy bilaterally. Bony landmarks of the ossicular chain are normal. The tympanic membranes are normal in appearance. No retraction, perforation, or masses.  No fluid or purulence was seen in the external canal or the middle ear.   Eyes - Extraocular movements intact.  Sclera were not icteric or injected, conjunctiva were pink and moist.  Mouth - Examination of the oral cavity showed pink, healthy oral mucosa. No lesions or ulcerations noted.  The tongue was mobile and midline, and the dentition were in good condition.    Throat - The walls of the oropharynx were smooth, pink, moist, symmetric, and had no lesions or ulcerations.  The tonsillar pillars and soft palate were symmetric.  The uvula was midline on elevation.   Neck - Normal midline excursion of the laryngotracheal complex during swallowing.  Full range of motion on passive movement.  Palpation of the occipital, submental, submandibular, internal jugular chain, and supraclavicular nodes did not demonstrate any abnormal lymph nodes or masses.  The carotid pulse was palpable bilaterally.  Palpation of the thyroid was soft and smooth, with no nodules or goiter appreciated.  The trachea was mobile and midline.  Nose - External contour is symmetric, no gross deflection or scars.  Nasal mucosa is pink and moist with no abnormal mucus.  The septum was midline and non-obstructive, turbinates of normal size and position.  No polyps, masses, or purulence noted on examination.      Audiogram 12/13/2018 Normal     Assessment - Alen Gibbs is a 47 year old male with normal hearing. I advise the patient I do not see any signs of hearing loss at this time. I counseled him on hearing protection when around loud noise. Follow up as needed.        Dr. Olive Gallardo MD  Otolaryngology  [facility] [place]    This document serves as a record of the services and decisions personally performed and made by Dr. Olive Gallardo MD. It was  created on his behalf by Crystal Ramierz, a trained medical scribe. The creation of this document is based the provider's statements to the medical scribe.    Mark Ramirez 9:50 AM 12/13/2018     The information in this document, created by a scribe for me, accurately reflects the services I personally performed and the decisions made by me. I have reviewed and approved this document for accuracy.         Again, thank you for allowing me to participate in the care of your patient.        Sincerely,        Olive Gallardo MD

## 2018-12-13 NOTE — PROGRESS NOTES
History of Present Illness - Alen Gibbs is a 47 year old male with complaints of hearing loss. Patient reports his significant other believes he is not hearing well. The patient himself has not noticed any hearing loss, but was concerned about tinnitus. Patient was in the , and does typically wear hearing protection while shooting firearms.     Past Medical History -   Patient Active Problem List   Diagnosis     CARDIOVASCULAR SCREENING; LDL GOAL LESS THAN 130     Tinea versicolor     Essential hypertension with goal blood pressure less than 130/80     Chondromalacia of patella, right       Current Medications -   Current Outpatient Medications:      Acetaminophen (TYLENOL PO), Take 1,300 mg by mouth every 8 hours as needed for mild pain or fever, Disp: , Rfl:      Cholecalciferol (VITAMIN D) 2000 UNIT tablet, Take 1 tablet by mouth daily., Disp: , Rfl:      lisinopril (PRINIVIL/ZESTRIL) 10 MG tablet, TAKE ONE TABLET BY MOUTH EVERY DAY, Disp: 90 tablet, Rfl: 3     allopurinol (ZYLOPRIM) 300 MG tablet, TAKE ONE TABLET BY MOUTH EVERY DAY (Patient not taking: Reported on 12/13/2018), Disp: 30 tablet, Rfl: 1     IBUPROFEN PO, Take 800 mg by mouth every 8 hours as needed for moderate pain, Disp: , Rfl:     Allergies -   Allergies   Allergen Reactions     Norco [Hydrocodone-Acetaminophen] Nausea     Simvastatin Other (See Comments)     fasciatus       Social History -   Social History     Socioeconomic History     Marital status:      Spouse name: Not on file     Number of children: Not on file     Years of education: Not on file     Highest education level: Not on file   Social Needs     Financial resource strain: Not on file     Food insecurity - worry: Not on file     Food insecurity - inability: Not on file     Transportation needs - medical: Not on file     Transportation needs - non-medical: Not on file   Occupational History     Not on file   Tobacco Use     Smoking status: Never Smoker      "Smokeless tobacco: Never Used   Substance and Sexual Activity     Alcohol use: Yes     Comment: occ     Drug use: No     Sexual activity: Yes     Partners: Female   Other Topics Concern     Parent/sibling w/ CABG, MI or angioplasty before 65F 55M? No   Social History Narrative     Not on file       Family History -   Family History   Problem Relation Age of Onset     Breast Cancer Mother      Cardiovascular Father         CHF     Diabetes Father 66        Type II     Alzheimer Disease Maternal Grandmother      Respiratory Paternal Grandfather         emphysema     Blood Disease Brother 4        leukemia     Diabetes Daughter         type I       Review of Systems - As per HPI and PMHx, otherwise 7 system review of the head and neck negative. 10+ system review negative.    Physical Exam  /76 (BP Location: Right arm, Patient Position: Sitting, Cuff Size: Adult Regular)   Pulse 78   Temp 97.8  F (36.6  C) (Oral)   Ht 1.803 m (5' 11\")   Wt 122.5 kg (270 lb)   BMI 37.66 kg/m    General - The patient is well nourished and well developed, and appears to have good nutritional status.  Alert and oriented to person and place, answers questions and cooperates with examination appropriately.   Head and Face - Normocephalic and atraumatic, with no gross asymmetry noted of the contour of the facial features.  The facial nerve is intact, with strong symmetric movements.  Voice and Breathing - The patient was breathing comfortably without the use of accessory muscles. There was no wheezing, stridor, or stertor.  The patients voice was clear and strong, and had appropriate pitch and quality.  Ears - Bilateral pinna and EACs with normal appearing overlying skin. Tympanic membrane intact with good mobility on pneumatic otoscopy bilaterally. Bony landmarks of the ossicular chain are normal. The tympanic membranes are normal in appearance. No retraction, perforation, or masses.  No fluid or purulence was seen in the external " canal or the middle ear.   Eyes - Extraocular movements intact.  Sclera were not icteric or injected, conjunctiva were pink and moist.  Mouth - Examination of the oral cavity showed pink, healthy oral mucosa. No lesions or ulcerations noted.  The tongue was mobile and midline, and the dentition were in good condition.    Throat - The walls of the oropharynx were smooth, pink, moist, symmetric, and had no lesions or ulcerations.  The tonsillar pillars and soft palate were symmetric.  The uvula was midline on elevation.   Neck - Normal midline excursion of the laryngotracheal complex during swallowing.  Full range of motion on passive movement.  Palpation of the occipital, submental, submandibular, internal jugular chain, and supraclavicular nodes did not demonstrate any abnormal lymph nodes or masses.  The carotid pulse was palpable bilaterally.  Palpation of the thyroid was soft and smooth, with no nodules or goiter appreciated.  The trachea was mobile and midline.  Nose - External contour is symmetric, no gross deflection or scars.  Nasal mucosa is pink and moist with no abnormal mucus.  The septum was midline and non-obstructive, turbinates of normal size and position.  No polyps, masses, or purulence noted on examination.      Audiogram 12/13/2018 Normal     Assessment - Alen Gibbs is a 47 year old male with normal hearing. I advise the patient I do not see any signs of hearing loss at this time. I counseled him on hearing protection when around loud noise. Follow up as needed.        Dr. Olive Gallardo MD  Otolaryngology  [facility]    This document serves as a record of the services and decisions personally performed and made by Dr. Olive Gallardo MD. It was created on his behalf by Crystal Ramirez, a trained medical scribe. The creation of this document is based the provider's statements to the medical scribe.    Mark Ramirez 9:50 AM 12/13/2018     The information in this document,  created by a scribe for me, accurately reflects the services I personally performed and the decisions made by me. I have reviewed and approved this document for accuracy.

## 2019-01-18 ENCOUNTER — TELEPHONE (OUTPATIENT)
Dept: FAMILY MEDICINE | Facility: CLINIC | Age: 49
End: 2019-01-18

## 2019-01-18 DIAGNOSIS — M10.9 ACUTE GOUTY ARTHRITIS: ICD-10-CM

## 2019-01-18 RX ORDER — ALLOPURINOL 300 MG/1
TABLET ORAL
Qty: 30 TABLET | Refills: 0 | Status: SHIPPED | OUTPATIENT
Start: 2019-01-18 | End: 2019-03-01

## 2019-01-18 NOTE — TELEPHONE ENCOUNTER
"Requested Prescriptions   Pending Prescriptions Disp Refills     allopurinol (ZYLOPRIM) 300 MG tablet [Pharmacy Med Name: ALLOPURINOL 300MG TABS] 30 tablet 1     Sig: TAKE ONE TABLET BY MOUTH EVERY DAY    Gout Agents Protocol Failed - 1/18/2019 12:58 PM       Failed - ALT on file in past 12 months    Recent Labs   Lab Test 07/16/15  0902   ALT 33            Failed - Has Uric Acid on file in past 12 months and value is less than 6    Recent Labs   Lab Test 05/10/17  1239   URIC 8.0*     If level is 6mg/dL or greater, ok to refill one time and refer to provider.          Passed - CBC on file in past 12 months    Recent Labs   Lab Test 07/27/18  1220   WBC 4.7   RBC 5.47   HGB 15.9   HCT 48.6                   Passed - Recent (12 mo) or future (30 days) visit within the authorizing provider's specialty    Patient had office visit in the last 12 months or has a visit in the next 30 days with authorizing provider or within the authorizing provider's specialty.  See \"Patient Info\" tab in inbasket, or \"Choose Columns\" in Meds & Orders section of the refill encounter.             Passed - Medication is active on med list       Passed - Patient is age 18 or older       Passed - Normal serum creatinine on file in the past 12 months    Recent Labs   Lab Test 07/27/18  1220   CR 0.97               "

## 2019-01-18 NOTE — TELEPHONE ENCOUNTER
Patient called and told is due for Lisinopril medication review, appointment is scheduled for 1-24-19, told the patient to be fasting as is due for Lipid screening, not on any statins.    SEBLE Rodriguez

## 2019-01-18 NOTE — TELEPHONE ENCOUNTER
Patient due for non fasting labs.   30 day supply provided. Labs ordered.    Left message for patient to call back at [phone].  Analy CABRERA RN

## 2019-02-22 ENCOUNTER — APPOINTMENT (OUTPATIENT)
Dept: CT IMAGING | Facility: CLINIC | Age: 49
End: 2019-02-22
Attending: FAMILY MEDICINE
Payer: COMMERCIAL

## 2019-02-22 ENCOUNTER — HOSPITAL ENCOUNTER (EMERGENCY)
Facility: CLINIC | Age: 49
Discharge: HOME | End: 2019-02-22
Attending: FAMILY MEDICINE | Admitting: FAMILY MEDICINE
Payer: COMMERCIAL

## 2019-02-22 ENCOUNTER — APPOINTMENT (OUTPATIENT)
Dept: MRI IMAGING | Facility: CLINIC | Age: 49
End: 2019-02-22
Attending: FAMILY MEDICINE
Payer: COMMERCIAL

## 2019-02-22 VITALS
DIASTOLIC BLOOD PRESSURE: 82 MMHG | TEMPERATURE: 97.6 F | OXYGEN SATURATION: 96 % | SYSTOLIC BLOOD PRESSURE: 124 MMHG | HEART RATE: 60 BPM | RESPIRATION RATE: 11 BRPM

## 2019-02-22 DIAGNOSIS — H53.121 TRANSIENT VISUAL LOSS OF RIGHT EYE: ICD-10-CM

## 2019-02-22 LAB
ANION GAP SERPL CALCULATED.3IONS-SCNC: 6 MMOL/L (ref 3–14)
APTT PPP: 31 SEC (ref 22–37)
BASOPHILS # BLD AUTO: 0.1 10E9/L (ref 0–0.2)
BASOPHILS NFR BLD AUTO: 1.1 %
BUN SERPL-MCNC: 18 MG/DL (ref 7–30)
CALCIUM SERPL-MCNC: 8.7 MG/DL (ref 8.5–10.1)
CHLORIDE SERPL-SCNC: 109 MMOL/L (ref 94–109)
CO2 SERPL-SCNC: 26 MMOL/L (ref 20–32)
CREAT SERPL-MCNC: 1.07 MG/DL (ref 0.66–1.25)
DIFFERENTIAL METHOD BLD: NORMAL
EOSINOPHIL # BLD AUTO: 0.2 10E9/L (ref 0–0.7)
EOSINOPHIL NFR BLD AUTO: 2.7 %
ERYTHROCYTE [DISTWIDTH] IN BLOOD BY AUTOMATED COUNT: 13.2 % (ref 10–15)
GFR SERPL CREATININE-BSD FRML MDRD: 81 ML/MIN/{1.73_M2}
GLUCOSE BLDC GLUCOMTR-MCNC: 105 MG/DL (ref 70–99)
GLUCOSE SERPL-MCNC: 97 MG/DL (ref 70–99)
HCT VFR BLD AUTO: 47.7 % (ref 40–53)
HGB BLD-MCNC: 16 G/DL (ref 13.3–17.7)
IMM GRANULOCYTES # BLD: 0 10E9/L (ref 0–0.4)
IMM GRANULOCYTES NFR BLD: 0.4 %
INR PPP: 0.97 (ref 0.86–1.14)
LYMPHOCYTES # BLD AUTO: 2.2 10E9/L (ref 0.8–5.3)
LYMPHOCYTES NFR BLD AUTO: 31.3 %
MCH RBC QN AUTO: 30.5 PG (ref 26.5–33)
MCHC RBC AUTO-ENTMCNC: 33.5 G/DL (ref 31.5–36.5)
MCV RBC AUTO: 91 FL (ref 78–100)
MONOCYTES # BLD AUTO: 0.5 10E9/L (ref 0–1.3)
MONOCYTES NFR BLD AUTO: 7.4 %
NEUTROPHILS # BLD AUTO: 4 10E9/L (ref 1.6–8.3)
NEUTROPHILS NFR BLD AUTO: 57.1 %
NRBC # BLD AUTO: 0 10*3/UL
NRBC BLD AUTO-RTO: 0 /100
PLATELET # BLD AUTO: 192 10E9/L (ref 150–450)
POTASSIUM SERPL-SCNC: 4.2 MMOL/L (ref 3.4–5.3)
RBC # BLD AUTO: 5.24 10E12/L (ref 4.4–5.9)
SODIUM SERPL-SCNC: 141 MMOL/L (ref 133–144)
TROPONIN I SERPL-MCNC: <0.015 UG/L (ref 0–0.04)
WBC # BLD AUTO: 7.1 10E9/L (ref 4–11)

## 2019-02-22 PROCEDURE — 93010 ELECTROCARDIOGRAM REPORT: CPT | Mod: Z6 | Performed by: FAMILY MEDICINE

## 2019-02-22 PROCEDURE — 70498 CT ANGIOGRAPHY NECK: CPT

## 2019-02-22 PROCEDURE — 25500064 ZZH RX 255 OP 636: Performed by: FAMILY MEDICINE

## 2019-02-22 PROCEDURE — 25000128 H RX IP 250 OP 636: Performed by: FAMILY MEDICINE

## 2019-02-22 PROCEDURE — 80048 BASIC METABOLIC PNL TOTAL CA: CPT | Performed by: FAMILY MEDICINE

## 2019-02-22 PROCEDURE — A9585 GADOBUTROL INJECTION: HCPCS | Performed by: FAMILY MEDICINE

## 2019-02-22 PROCEDURE — 25000125 ZZHC RX 250: Performed by: FAMILY MEDICINE

## 2019-02-22 PROCEDURE — 96374 THER/PROPH/DIAG INJ IV PUSH: CPT | Mod: 59

## 2019-02-22 PROCEDURE — 96361 HYDRATE IV INFUSION ADD-ON: CPT

## 2019-02-22 PROCEDURE — 93005 ELECTROCARDIOGRAM TRACING: CPT

## 2019-02-22 PROCEDURE — 70450 CT HEAD/BRAIN W/O DYE: CPT | Mod: XS

## 2019-02-22 PROCEDURE — 85730 THROMBOPLASTIN TIME PARTIAL: CPT | Performed by: FAMILY MEDICINE

## 2019-02-22 PROCEDURE — 70553 MRI BRAIN STEM W/O & W/DYE: CPT

## 2019-02-22 PROCEDURE — 85025 COMPLETE CBC W/AUTO DIFF WBC: CPT | Performed by: FAMILY MEDICINE

## 2019-02-22 PROCEDURE — 99285 EMERGENCY DEPT VISIT HI MDM: CPT | Mod: 25 | Performed by: FAMILY MEDICINE

## 2019-02-22 PROCEDURE — 00000146 ZZHCL STATISTIC GLUCOSE BY METER IP

## 2019-02-22 PROCEDURE — 84484 ASSAY OF TROPONIN QUANT: CPT | Performed by: FAMILY MEDICINE

## 2019-02-22 PROCEDURE — 85610 PROTHROMBIN TIME: CPT | Performed by: FAMILY MEDICINE

## 2019-02-22 PROCEDURE — 99285 EMERGENCY DEPT VISIT HI MDM: CPT | Mod: 25

## 2019-02-22 RX ORDER — IOPAMIDOL 755 MG/ML
70 INJECTION, SOLUTION INTRAVASCULAR ONCE
Status: COMPLETED | OUTPATIENT
Start: 2019-02-22 | End: 2019-02-22

## 2019-02-22 RX ORDER — LORAZEPAM 2 MG/ML
1 INJECTION INTRAMUSCULAR ONCE
Status: COMPLETED | OUTPATIENT
Start: 2019-02-22 | End: 2019-02-22

## 2019-02-22 RX ORDER — GADOBUTROL 604.72 MG/ML
10 INJECTION INTRAVENOUS ONCE
Status: COMPLETED | OUTPATIENT
Start: 2019-02-22 | End: 2019-02-22

## 2019-02-22 RX ADMIN — LORAZEPAM 1 MG: 2 INJECTION, SOLUTION INTRAMUSCULAR; INTRAVENOUS at 11:23

## 2019-02-22 RX ADMIN — IOPAMIDOL 70 ML: 755 INJECTION, SOLUTION INTRAVENOUS at 10:39

## 2019-02-22 RX ADMIN — SODIUM CHLORIDE 500 ML: 9 INJECTION, SOLUTION INTRAVENOUS at 11:22

## 2019-02-22 RX ADMIN — GADOBUTROL 10 ML: 604.72 INJECTION INTRAVENOUS at 11:46

## 2019-02-22 RX ADMIN — SODIUM CHLORIDE 70 ML: 9 INJECTION, SOLUTION INTRAVENOUS at 10:39

## 2019-02-22 NOTE — ED PROVIDER NOTES
"  History     Chief Complaint   Patient presents with     Loss of Vision     for about an hour this morning     HPI  Alen iGbbs is a 48 year old male, past medical history is significant for hypertension, gout, chondromalacia patellae, presents to the emergency department with concerns of transient loss of vision/visual abnormality involving his right eye upon awakening at approximately 9:00 this morning.  History is obtained from the patient presents with his wife with concerns that when he awoke this morning he had some loss of vision around the peripheries of vision of his right eye.  Centrally it seemed to clear.  The periphery seemed hazy, black, \"iridescent\", lasted about an hour and is resolved at the time of presentation.  He also described it as a somewhat crescentic right lateral loss of vision.  This has never happened before.  It was not associated with headache although he did have a moderate headache yesterday for which he took ibuprofen with improvement.  This was not the worst headache of life.  It was not brought on by any type of Valsalva type maneuver.  The headache was all over achy.  He has had headaches like it before.  He denied recent head trauma although his wife thinks that he has hit his head several times over the last 4-6 weeks, he remembers slipping and falling in the driveway clearing snow without head trauma about 2 weeks ago.  He notes no nausea or vomiting presently.  Visual abnormality has resolved.  He notes no chest pain or shortness of air.  He notes no paresthesias or weakness involving the extremities.      Allergies:  Allergies   Allergen Reactions     Norco [Hydrocodone-Acetaminophen] Nausea     Simvastatin Other (See Comments)     fasciatus       Problem List:    Patient Active Problem List    Diagnosis Date Noted     Chondromalacia of patella, right 01/08/2018     Priority: Medium     Essential hypertension with goal blood pressure less than 130/80 05/20/2016     " Priority: Medium     Tinea versicolor 01/15/2016     Priority: Medium     CARDIOVASCULAR SCREENING; LDL GOAL LESS THAN 130 07/21/2015     Priority: Medium        Past Medical History:    Past Medical History:   Diagnosis Date     Hyperlipidemia      Syncope and collapse        Past Surgical History:    Past Surgical History:   Procedure Laterality Date     ARTHROTOMY WRIST Right 6/10/2016    Procedure: ARTHROTOMY WRIST;  Surgeon: Toni Ewing MD;  Location: [facility] OR     BONE MARROW ASPIRATION ONLY         Family History:    Family History   Problem Relation Age of Onset     Breast Cancer Mother      Cardiovascular Father         CHF     Diabetes Father 66        Type II     Alzheimer Disease Maternal Grandmother      Respiratory Paternal Grandfather         emphysema     Blood Disease Brother 4        leukemia     Diabetes Daughter         type I       Social History:  Marital Status:   [2]  Social History     Tobacco Use     Smoking status: Never Smoker     Smokeless tobacco: Never Used   Substance Use Topics     Alcohol use: Yes     Comment: occ     Drug use: No        Medications:      Acetaminophen (TYLENOL PO)   allopurinol (ZYLOPRIM) 300 MG tablet   IBUPROFEN PO   lisinopril (PRINIVIL/ZESTRIL) 10 MG tablet         Review of Systems   All other systems reviewed and are negative.      Physical Exam   BP: 131/84  Pulse: 60  Heart Rate: 72  Temp: 97.6  F (36.4  C)  Resp: 16  SpO2: 98 %      Physical Exam   Constitutional: He appears well-developed and well-nourished.   Alert, appropriate, no dysarthria, no aphasia.  Oriented x3.  GCS 15.   HENT:   Head: Normocephalic and atraumatic.   Right Ear: External ear normal.   Left Ear: External ear normal.   Nose: Nose normal.   Mouth/Throat: Oropharynx is clear and moist.   Eyes: Conjunctivae and EOM are normal. Pupils are equal, round, and reactive to light.   Neck: Normal range of motion. Neck supple.   Cardiovascular: Normal rate, regular rhythm, normal  heart sounds and intact distal pulses.   Pulmonary/Chest: Effort normal and breath sounds normal.   Abdominal: Soft. Bowel sounds are normal.   Musculoskeletal: Normal range of motion.   Neurological: He is alert.   Skin: Skin is warm. Capillary refill takes less than 2 seconds.   Psychiatric: He has a normal mood and affect. His behavior is normal.   Nursing note and vitals reviewed.      ED Course        Procedures               EKG Interpretation:      Interpreted by Alen Burgess  Time reviewed: 1055  59 bpm sinus rhythm no acute ST-T wave changes.  Normal axis.  Normal intervals.  Normal EKG.    Labs Ordered and Resulted from Time of ED Arrival Up to the Time of Departure from the ED   GLUCOSE BY METER - Abnormal; Notable for the following components:       Result Value    Glucose 105 (*)     All other components within normal limits   CBC WITH PLATELETS DIFFERENTIAL   BASIC METABOLIC PANEL   INR   PARTIAL THROMBOPLASTIN TIME   TROPONIN I       Results for orders placed or performed during the hospital encounter of 02/22/19   CT Head w/o Contrast    Narrative    CT SCAN OF THE HEAD WITHOUT CONTRAST   2/22/2019 10:42 AM     HISTORY: Transient vision loss right eye of approximately 1 hour  duration, resolved now.    TECHNIQUE:  Axial images of the head and coronal reformations without  IV contrast material. Radiation dose for this scan was reduced using  automated exposure control, adjustment of the mA and/or kV according  to patient size, or iterative reconstruction technique.    COMPARISON: Scan dated 7/27/2018    FINDINGS:  The ventricles are normal in size, shape and configuration.   The brain parenchyma and subarachnoid spaces are normal. There is no  evidence of intracranial hemorrhage, mass, acute infarct or anomaly.  The visualized portions of the sinuses and mastoids appear normal.  There is no evidence of trauma.      Impression    IMPRESSION: No acute pathology. No bleed, mass, or infarcts are  seen.      YVONNE GAYTAN MD   CT Head Neck Angio w/o & w Contrast    Narrative    CTA HEAD NECK WITH CONTRAST  2/22/2019 10:45 AM     HISTORY: Transient right eye vision loss, resolved    TECHNIQUE:  Precontrast localizing scans were followed by CT  angiography with an injection of 70mL Isovue-370 IV contrast with  scans through the head and neck.  Images were transferred to a  separate 3-D workstation where multiplanar reformations and 3-D images  were created.  Estimates of carotid stenoses are made relative to the  distal internal carotid artery diameters except as noted. Radiation  dose for this scan was reduced using automated exposure control,  adjustment of the mA and/or kV according to patient size, or iterative  reconstruction technique.    COMPARISON: None.    FINDINGS: Estimates of stenosis at the carotid bifurcations are  relative to the distal internal carotids.    Arch: [ Normal Anatomy]    Neck:  Right Carotid:  The right common carotid artery is normal.  The right  carotid bifurcation appears normal.  The right internal carotid artery  is negative.     Left Carotid:  The left common carotid artery is unremarkable.   The  left carotid bifurcation appears normal.  The left internal carotid is  negative.      Vertebrals:  The vertebral arteries are normal.    Head:  Right Carotid:No occluded vessels are seen. .    Left Carotid:  No occluded vessels are identified. .    Basilar:  The basilar artery and its branches appear normal.     Other findings: None.      Impression    IMPRESSION:   1. No stenosis is seen at either carotid bifurcation.  2. No arterial dissection is identified.  3. No high-grade intracranial vascular stenosis is identified.  4. Venous sinuses appear patent        YVONNE GAYTAN MD   MR Brain w/o & w Contrast    Narrative    MRI BRAIN WITHOUT AND WITH CONTRAST  2/22/2019 12:13 PM    HISTORY:  Transient loss of vision right eye.  CT and CTA negative.     TECHNIQUE:  Multiplanar, multisequence  MRI of the brain without and  with 10 mL Gadavist    COMPARISON: CT studies dated 2/20/2018    FINDINGS:  The brain parenchyma, ventricles and subarachnoid spaces  appear normal. There is no evidence of hemorrhage, mass, acute  infarct, or anomaly.  There are no gadolinium enhancing lesions.   The  facial structures appear normal. The arteries at the base of the brain  and the dural venous sinuses appear patent. Scans through the orbits  are grossly normal.      Impression    IMPRESSION:  No acute pathology, no bleed, mass, or acute infarcts are  seen.      YVONNE GAYTAN MD                 Critical Care time:  none               No results found for this or any previous visit (from the past 24 hour(s)).11:13 AM  Verbal report from the radiologist of negative CT and CTA.  Discussed with the patient and his wife.  We will proceed to MRI.  The patient request something for sedation for the MRI.  IV 1 mg Ativan given.  11:33 AM  Awaiting MRI currently, I spoke with Dr. Dontrell Pedroza for ophthalmology and reviewed the patient with him.  His MRI is negative I would like to have him assessed with a proper dilated exam with ophthalmology.  Dr. Pedroza will make some arrangements for him to be seen either later this morning or early afternoon here or potentially an alternate clinic also this afternoon.      Medications   0.9% sodium chloride BOLUS (0 mLs Intravenous Stopped 2/22/19 1306)   iopamidol (ISOVUE-370) solution 70 mL (70 mLs Intravenous Given 2/22/19 1039)   sodium chloride 0.9 % bag 500mL for CT scan flush use (70 mLs Intravenous Given 2/22/19 1039)   LORazepam (ATIVAN) injection 1 mg (1 mg Intravenous Given 2/22/19 1123)   gadobutrol (GADAVIST) injection 10 mL (10 mLs Intravenous Given 2/22/19 1146)   sodium chloride (PF) 0.9% PF flush 10 mL (10 mLs Intravenous Given 2/22/19 1146)       Assessments & Plan (with Medical Decision Making)   48-year-old male past medical history reviewed above, presents the emergency  department with concerns of acute loss of vision involving his right eye as described in the HPI.  The patient had no symptoms upon arrival.  Physical exam including neurologic exam was symmetric and without abnormality.  Differential diagnostic considerations are discussed with the patient and his wife and would include vascular phenomenon such as ocular TIA, CVA, bleed, aneurysm, AVM, atypical or ocular migraine in this case, MS, vitreous detachment, retinal detachment.  With this in mind imaging studies were initially obtained under the stroke protocol including CT and CTA which are reviewed as above and are acutely negative.  Lab diagnostics are reviewed and showed no significant abnormals.  Subsequently MRI imaging of the head was obtained again demonstrating no acute abnormality.  The patient was discussed at the time stamp above with ophthalmology and arrangements for the patient to be seen in clinic made for ophthalmology evaluation.  Provided this is normal I think the most likely explanation for the patient's presentation today would be ocular migraine.  This was discussed at length with the patient and his wife and they were disposition ophthalmology from the emergency department.  All questions were answered.  Disposition in improved condition.    Disclaimer: This note consists of symbols derived from keyboarding, dictation and/or voice recognition software. As a result, there may be errors in the script that have gone undetected. Please consider this when interpreting information found in this chart.      I have reviewed the nursing notes.    I have reviewed the findings, diagnosis, plan and need for follow up with the patient.          Medication List      There are no discharge medications for this visit.         Final diagnoses:   Transient visual loss of right eye - Suspect ocular migraine       2/22/2019   [facility] EMERGENCY DEPARTMENT     Alen Burgess MD  02/23/19 0429

## 2019-02-22 NOTE — DISCHARGE INSTRUCTIONS
To ophthalmology clinic for evaluation by Dr. Alex Bella.  Return to the emergency department if further concerns.

## 2019-02-22 NOTE — ED AVS SNAPSHOT
[facility] Emergency Department  [address] [address]  Phone:  [phone]  Fax:  [phone]                                    Alen Gibbs   MRN: [id number]    Department:  [facility] Emergency Department   Date of Visit:  2/22/2019           After Visit Summary Signature Page    I have received my discharge instructions, and my questions have been answered. I have discussed any challenges I see with this plan with the nurse or doctor.    ..........................................................................................................................................  Patient/Patient Representative Signature      ..........................................................................................................................................  Patient Representative Print Name and Relationship to Patient    ..................................................               ................................................  Date                                   Time    ..........................................................................................................................................  Reviewed by Signature/Title    ...................................................              ..............................................  Date                                               Time          22EPIC Rev 08/18

## 2019-02-22 NOTE — ED NOTES
"Pt awoke at approx 0900  Right eye loss of vision  Vision back \" to normal now \"  Denies HA now denies numbness or pain  Speech is clear  Pt is alert and oriented following all commands  "

## 2019-03-01 ENCOUNTER — OFFICE VISIT (OUTPATIENT)
Dept: FAMILY MEDICINE | Facility: CLINIC | Age: 49
End: 2019-03-01
Payer: COMMERCIAL

## 2019-03-01 VITALS
TEMPERATURE: 97.4 F | RESPIRATION RATE: 16 BRPM | OXYGEN SATURATION: 95 % | DIASTOLIC BLOOD PRESSURE: 86 MMHG | SYSTOLIC BLOOD PRESSURE: 122 MMHG | HEART RATE: 75 BPM | HEIGHT: 71 IN | WEIGHT: 275 LBS | BODY MASS INDEX: 38.5 KG/M2

## 2019-03-01 DIAGNOSIS — I10 ESSENTIAL HYPERTENSION WITH GOAL BLOOD PRESSURE LESS THAN 130/80: ICD-10-CM

## 2019-03-01 DIAGNOSIS — M10.9 ACUTE GOUTY ARTHRITIS: ICD-10-CM

## 2019-03-01 DIAGNOSIS — E66.01 MORBID OBESITY (H): ICD-10-CM

## 2019-03-01 PROCEDURE — 99214 OFFICE O/P EST MOD 30 MIN: CPT | Performed by: FAMILY MEDICINE

## 2019-03-01 RX ORDER — ALLOPURINOL 300 MG/1
1 TABLET ORAL DAILY
Qty: 90 TABLET | Refills: 3 | Status: SHIPPED | OUTPATIENT
Start: 2019-03-01 | End: 2020-03-31

## 2019-03-01 RX ORDER — LISINOPRIL 10 MG/1
10 TABLET ORAL DAILY
Qty: 90 TABLET | Refills: 3 | Status: SHIPPED | OUTPATIENT
Start: 2019-03-01 | End: 2020-03-31

## 2019-03-01 ASSESSMENT — MIFFLIN-ST. JEOR: SCORE: 2138.48

## 2019-03-01 NOTE — PROGRESS NOTES
"  SUBJECTIVE:   Alen Gibbs is a 48 year old male who presents to clinic today for the following health issues:      Hypertension Follow-up  This is his second day without his medication as he is out of the med.      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  Results are 120'/70' or lower.    Low Salt Diet: not monitoring salt    Medication Followup of Gout    Taking Medication as prescribed: yes    Side Effects:  None    Medication Helping Symptoms:  Yes, no recent flare of symptoms.         Amount of exercise or physical activity: Active at work.    Problems taking medications regularly: Can forget sometimes if he is busy.    Medication side effects: none    Diet: regular (no restrictions)      Current Outpatient Medications:      Acetaminophen (TYLENOL PO), Take 1,300 mg by mouth every 8 hours as needed for mild pain or fever, Disp: , Rfl:      allopurinol (ZYLOPRIM) 300 MG tablet, TAKE ONE TABLET BY MOUTH EVERY DAY, Disp: 30 tablet, Rfl: 0     IBUPROFEN PO, Take 800 mg by mouth every 8 hours as needed for moderate pain, Disp: , Rfl:      lisinopril (PRINIVIL/ZESTRIL) 10 MG tablet, TAKE ONE TABLET BY MOUTH EVERY DAY, Disp: 90 tablet, Rfl: 3    Patient Active Problem List   Diagnosis     CARDIOVASCULAR SCREENING; LDL GOAL LESS THAN 130     Tinea versicolor     Essential hypertension with goal blood pressure less than 130/80     Chondromalacia of patella, right       Blood pressure 122/86, pulse 75, temperature 97.4  F (36.3  C), temperature source Tympanic, resp. rate 16, height 1.81 m (5' 11.25\"), weight 124.7 kg (275 lb), SpO2 95 %.    Exam:  GENERAL APPEARANCE: healthy, alert and no distress  EYES: EOMI,  PERRL  NECK: no adenopathy, no asymmetry, masses, or scars and thyroid normal to palpation  RESP: lungs clear to auscultation - no rales, rhonchi or wheezes  CV: regular rates and rhythm, normal S1 S2, no S3 or S4 and no murmur, click or rub -  MS: extremities normal- no gross deformities noted, " no evidence of inflammation in joints, FROM in all extremities.  SKIN: no suspicious lesions or rashes  PSYCH: mentation appears normal and affect normal/bright      (M10.9) Acute gouty arthritis  Comment:   Plan: allopurinol (ZYLOPRIM) 300 MG tablet, Uric acid        Use the lower protein diet and stay well hydrated and do the uric acid soon and annually before the appt.   Refills are done on the med for one year.     (I10) Essential hypertension with goal blood pressure less than 130/80  Comment:   Plan: lisinopril (PRINIVIL/ZESTRIL) 10 MG tablet,         Creatinine, Potassium        Monitor and record the BP at rest and the goal is above. Use the non drug therapies.   The labs were normal recently and do these annually before the appt. Refills done for one year.     Nito Pruitt

## 2019-03-01 NOTE — PATIENT INSTRUCTIONS
Thank you for choosing [facility].  You may be receiving a survey in the mail from Kieran George regarding your visit today.  Please take a few minutes to complete and return the survey to let us know how we are doing.      If you have questions or concerns, please contact us via [facility] or you can contact your care team at [phone].    Our Clinic hours are:  Monday 6:40 am  to 7:00 pm  Tuesday -Friday 6:40 am to 5:00 pm    The [place] outpatient lab hours are:  Monday - Friday 6:10 am to 4:45 pm  Saturdays 7:00 am to 11:00 am  Appointments are required, call [phone]    If you have clinical questions after hours or would like to schedule an appointment,  call the clinic at [phone].    (M10.9) Acute gouty arthritis  Comment:   Plan: allopurinol (ZYLOPRIM) 300 MG tablet, Uric acid        Use the lower protein diet and stay well hydrated and do the uric acid soon and annually before the appt.   Refills are done on the med for one year.     (I10) Essential hypertension with goal blood pressure less than 130/80  Comment:   Plan: lisinopril (PRINIVIL/ZESTRIL) 10 MG tablet,         Creatinine, Potassium        Monitor and record the BP at rest and the goal is above. Use the non drug therapies.   The labs were normal recently and do these annually before the appt. Refills done for one year.

## 2019-04-29 ENCOUNTER — HOSPITAL ENCOUNTER (EMERGENCY)
Facility: CLINIC | Age: 49
Discharge: HOME | End: 2019-04-29
Attending: EMERGENCY MEDICINE | Admitting: EMERGENCY MEDICINE
Payer: COMMERCIAL

## 2019-04-29 ENCOUNTER — APPOINTMENT (OUTPATIENT)
Dept: CT IMAGING | Facility: CLINIC | Age: 49
End: 2019-04-29
Attending: EMERGENCY MEDICINE
Payer: COMMERCIAL

## 2019-04-29 VITALS
DIASTOLIC BLOOD PRESSURE: 89 MMHG | HEIGHT: 71 IN | SYSTOLIC BLOOD PRESSURE: 123 MMHG | TEMPERATURE: 98.3 F | WEIGHT: 260 LBS | HEART RATE: 98 BPM | RESPIRATION RATE: 20 BRPM | OXYGEN SATURATION: 96 % | BODY MASS INDEX: 36.4 KG/M2

## 2019-04-29 DIAGNOSIS — R10.84 ABDOMINAL PAIN, GENERALIZED: ICD-10-CM

## 2019-04-29 LAB
ALBUMIN SERPL-MCNC: 4.1 G/DL (ref 3.4–5)
ALP SERPL-CCNC: 67 U/L (ref 40–150)
ALT SERPL W P-5'-P-CCNC: 50 U/L (ref 0–70)
ANION GAP SERPL CALCULATED.3IONS-SCNC: 7 MMOL/L (ref 3–14)
AST SERPL W P-5'-P-CCNC: 30 U/L (ref 0–45)
BASOPHILS # BLD AUTO: 0.1 10E9/L (ref 0–0.2)
BASOPHILS NFR BLD AUTO: 0.8 %
BILIRUB SERPL-MCNC: 0.4 MG/DL (ref 0.2–1.3)
BUN SERPL-MCNC: 16 MG/DL (ref 7–30)
CALCIUM SERPL-MCNC: 10 MG/DL (ref 8.5–10.1)
CHLORIDE SERPL-SCNC: 110 MMOL/L (ref 94–109)
CO2 SERPL-SCNC: 24 MMOL/L (ref 20–32)
CREAT SERPL-MCNC: 0.96 MG/DL (ref 0.66–1.25)
DIFFERENTIAL METHOD BLD: NORMAL
EOSINOPHIL # BLD AUTO: 0.1 10E9/L (ref 0–0.7)
EOSINOPHIL NFR BLD AUTO: 1.1 %
ERYTHROCYTE [DISTWIDTH] IN BLOOD BY AUTOMATED COUNT: 13.2 % (ref 10–15)
GFR SERPL CREATININE-BSD FRML MDRD: >90 ML/MIN/{1.73_M2}
GLUCOSE SERPL-MCNC: 96 MG/DL (ref 70–99)
HCT VFR BLD AUTO: 48 % (ref 40–53)
HGB BLD-MCNC: 15.7 G/DL (ref 13.3–17.7)
IMM GRANULOCYTES # BLD: 0.1 10E9/L (ref 0–0.4)
IMM GRANULOCYTES NFR BLD: 0.5 %
LIPASE SERPL-CCNC: 71 U/L (ref 73–393)
LYMPHOCYTES # BLD AUTO: 2 10E9/L (ref 0.8–5.3)
LYMPHOCYTES NFR BLD AUTO: 19.5 %
MCH RBC QN AUTO: 29.5 PG (ref 26.5–33)
MCHC RBC AUTO-ENTMCNC: 32.7 G/DL (ref 31.5–36.5)
MCV RBC AUTO: 90 FL (ref 78–100)
MONOCYTES # BLD AUTO: 0.6 10E9/L (ref 0–1.3)
MONOCYTES NFR BLD AUTO: 6.2 %
NEUTROPHILS # BLD AUTO: 7.4 10E9/L (ref 1.6–8.3)
NEUTROPHILS NFR BLD AUTO: 71.9 %
NRBC # BLD AUTO: 0 10*3/UL
NRBC BLD AUTO-RTO: 0 /100
PLATELET # BLD AUTO: 201 10E9/L (ref 150–450)
POTASSIUM SERPL-SCNC: 4.2 MMOL/L (ref 3.4–5.3)
PROT SERPL-MCNC: 7.4 G/DL (ref 6.8–8.8)
RBC # BLD AUTO: 5.32 10E12/L (ref 4.4–5.9)
SODIUM SERPL-SCNC: 141 MMOL/L (ref 133–144)
WBC # BLD AUTO: 10.2 10E9/L (ref 4–11)

## 2019-04-29 PROCEDURE — 99285 EMERGENCY DEPT VISIT HI MDM: CPT | Mod: Z6 | Performed by: EMERGENCY MEDICINE

## 2019-04-29 PROCEDURE — 25000128 H RX IP 250 OP 636: Performed by: EMERGENCY MEDICINE

## 2019-04-29 PROCEDURE — 85025 COMPLETE CBC W/AUTO DIFF WBC: CPT | Performed by: EMERGENCY MEDICINE

## 2019-04-29 PROCEDURE — 96374 THER/PROPH/DIAG INJ IV PUSH: CPT | Mod: 59 | Performed by: EMERGENCY MEDICINE

## 2019-04-29 PROCEDURE — 99285 EMERGENCY DEPT VISIT HI MDM: CPT | Mod: 25 | Performed by: EMERGENCY MEDICINE

## 2019-04-29 PROCEDURE — 74177 CT ABD & PELVIS W/CONTRAST: CPT

## 2019-04-29 PROCEDURE — 96375 TX/PRO/DX INJ NEW DRUG ADDON: CPT | Performed by: EMERGENCY MEDICINE

## 2019-04-29 PROCEDURE — 96361 HYDRATE IV INFUSION ADD-ON: CPT | Performed by: EMERGENCY MEDICINE

## 2019-04-29 PROCEDURE — 80053 COMPREHEN METABOLIC PANEL: CPT | Performed by: EMERGENCY MEDICINE

## 2019-04-29 PROCEDURE — 83690 ASSAY OF LIPASE: CPT | Performed by: EMERGENCY MEDICINE

## 2019-04-29 PROCEDURE — 25000125 ZZHC RX 250: Performed by: EMERGENCY MEDICINE

## 2019-04-29 RX ORDER — OXYCODONE AND ACETAMINOPHEN 5; 325 MG/1; MG/1
1-2 TABLET ORAL EVERY 4 HOURS PRN
Qty: 12 TABLET | Refills: 0 | Status: SHIPPED | OUTPATIENT
Start: 2019-04-29 | End: 2019-06-07

## 2019-04-29 RX ORDER — KETOROLAC TROMETHAMINE 30 MG/ML
30 INJECTION, SOLUTION INTRAMUSCULAR; INTRAVENOUS ONCE
Status: COMPLETED | OUTPATIENT
Start: 2019-04-29 | End: 2019-04-29

## 2019-04-29 RX ORDER — IOPAMIDOL 755 MG/ML
100 INJECTION, SOLUTION INTRAVASCULAR ONCE
Status: COMPLETED | OUTPATIENT
Start: 2019-04-29 | End: 2019-04-29

## 2019-04-29 RX ORDER — ONDANSETRON 2 MG/ML
4 INJECTION INTRAMUSCULAR; INTRAVENOUS ONCE
Status: COMPLETED | OUTPATIENT
Start: 2019-04-29 | End: 2019-04-29

## 2019-04-29 RX ORDER — SODIUM CHLORIDE 9 MG/ML
1000 INJECTION, SOLUTION INTRAVENOUS CONTINUOUS
Status: DISCONTINUED | OUTPATIENT
Start: 2019-04-29 | End: 2019-04-29 | Stop reason: HOSPADM

## 2019-04-29 RX ORDER — ONDANSETRON 4 MG/1
4 TABLET, ORALLY DISINTEGRATING ORAL EVERY 8 HOURS PRN
Qty: 10 TABLET | Refills: 2 | Status: SHIPPED | OUTPATIENT
Start: 2019-04-29 | End: 2019-06-07

## 2019-04-29 RX ADMIN — SODIUM CHLORIDE 1000 ML: 9 INJECTION, SOLUTION INTRAVENOUS at 17:45

## 2019-04-29 RX ADMIN — KETOROLAC TROMETHAMINE 30 MG: 30 INJECTION, SOLUTION INTRAMUSCULAR at 17:47

## 2019-04-29 RX ADMIN — SODIUM CHLORIDE 72 ML: 9 INJECTION, SOLUTION INTRAVENOUS at 17:57

## 2019-04-29 RX ADMIN — ONDANSETRON 4 MG: 2 INJECTION INTRAMUSCULAR; INTRAVENOUS at 17:46

## 2019-04-29 RX ADMIN — IOPAMIDOL 100 ML: 755 INJECTION, SOLUTION INTRAVENOUS at 17:57

## 2019-04-29 ASSESSMENT — MIFFLIN-ST. JEOR: SCORE: 2066.48

## 2019-04-29 NOTE — ED AVS SNAPSHOT
[facility] Emergency Department  [address] [address]  Phone:  [phone]  Fax:  [phone]                                    Alen Gibbs   MRN: [id number]    Department:  [facility] Emergency Department   Date of Visit:  4/29/2019           After Visit Summary Signature Page    I have received my discharge instructions, and my questions have been answered. I have discussed any challenges I see with this plan with the nurse or doctor.    ..........................................................................................................................................  Patient/Patient Representative Signature      ..........................................................................................................................................  Patient Representative Print Name and Relationship to Patient    ..................................................               ................................................  Date                                   Time    ..........................................................................................................................................  Reviewed by Signature/Title    ...................................................              ..............................................  Date                                               Time          22EPIC Rev 08/18

## 2019-04-29 NOTE — ED PROVIDER NOTES
History     Chief Complaint   Patient presents with     Abdominal Pain     HPI  Alen Gibbs is a 48 year old male with insidious onset of mid lower abdominal pain this morning, gradually progressively worsening now severe, constant, dull aching and a gassy bloated discomfort associated with nausea and sweating.  Nothing taken for pain relief no fever, vomiting, constipation or diarrhea.  No UTI signs or symptoms or hematuria.  No back or flank pain.    Allergies:  Allergies   Allergen Reactions     Norco [Hydrocodone-Acetaminophen] Nausea     Simvastatin Other (See Comments)     fasciatus       Problem List:    Patient Active Problem List    Diagnosis Date Noted     Obesity (BMI 35.0-39.9) with comorbidity (H) 03/01/2019     Priority: Medium     Chondromalacia of patella, right 01/08/2018     Priority: Medium     Essential hypertension with goal blood pressure less than 130/80 05/20/2016     Priority: Medium     Tinea versicolor 01/15/2016     Priority: Medium     CARDIOVASCULAR SCREENING; LDL GOAL LESS THAN 130 07/21/2015     Priority: Medium        Past Medical History:    Past Medical History:   Diagnosis Date     Hyperlipidemia      Syncope and collapse        Past Surgical History:    Past Surgical History:   Procedure Laterality Date     ARTHROTOMY WRIST Right 6/10/2016    Procedure: ARTHROTOMY WRIST;  Surgeon: Toni Ewing MD;  Location: [facility]     BONE MARROW ASPIRATION ONLY         Family History:    Family History   Problem Relation Age of Onset     Breast Cancer Mother      Cardiovascular Father         CHF     Diabetes Father 66        Type II     Alzheimer Disease Maternal Grandmother      Respiratory Paternal Grandfather         emphysema     Blood Disease Brother 4        leukemia     Diabetes Daughter         type I       Social History:  Marital Status:   [2]  Social History     Tobacco Use     Smoking status: Never Smoker     Smokeless tobacco: Never Used   Substance Use Topics  "    Alcohol use: Yes     Comment: occ     Drug use: No        Medications:      ondansetron (ZOFRAN ODT) 4 MG ODT tab   oxyCODONE-acetaminophen (PERCOCET) 5-325 MG tablet   Acetaminophen (TYLENOL PO)   allopurinol (ZYLOPRIM) 300 MG tablet   IBUPROFEN PO   lisinopril (PRINIVIL/ZESTRIL) 10 MG tablet       Review of Systems  As mentioned above in the history present illness.  All other systems were reviewed and are negative.    Physical Exam   BP: 123/89  Pulse: 98  Temp: 98.3  F (36.8  C)  Resp: 20  Height: 180.3 cm (5' 11\")  Weight: 117.9 kg (260 lb)  SpO2: 96 %      Physical Exam   Constitutional: He is oriented to person, place, and time. He appears well-developed and well-nourished. No distress.   HENT:   Head: Normocephalic and atraumatic.   Mouth/Throat: Oropharynx is clear and moist.   Eyes: Conjunctivae and EOM are normal. No scleral icterus.   Neck: Normal range of motion. Neck supple. No tracheal deviation present.   Cardiovascular: Normal rate, regular rhythm and normal heart sounds. Exam reveals no gallop and no friction rub.   No murmur heard.  Pulmonary/Chest: Effort normal and breath sounds normal. No respiratory distress. He has no wheezes. He has no rales.   Abdominal: Soft. Normal appearance. He exhibits no distension, no abdominal bruit and no pulsatile midline mass. Bowel sounds are increased. There is no hepatosplenomegaly. There is no tenderness. There is no tenderness at McBurney's point and negative Jeffrey's sign. No hernia.       Musculoskeletal: Normal range of motion. He exhibits no edema or tenderness.   Neurological: He is alert and oriented to person, place, and time. Coordination normal.   Skin: Skin is warm and dry. No rash noted. He is not diaphoretic. No erythema. No pallor.   Psychiatric: He has a normal mood and affect. His behavior is normal.   Nursing note and vitals reviewed.      ED Course        Procedures                   Results for orders placed or performed during the " hospital encounter of 04/29/19 (from the past 24 hour(s))   CBC with platelets differential   Result Value Ref Range    WBC 10.2 4.0 - 11.0 10e9/L    RBC Count 5.32 4.4 - 5.9 10e12/L    Hemoglobin 15.7 13.3 - 17.7 g/dL    Hematocrit 48.0 40.0 - 53.0 %    MCV 90 78 - 100 fl    MCH 29.5 26.5 - 33.0 pg    MCHC 32.7 31.5 - 36.5 g/dL    RDW 13.2 10.0 - 15.0 %    Platelet Count 201 150 - 450 10e9/L    Diff Method Automated Method     % Neutrophils 71.9 %    % Lymphocytes 19.5 %    % Monocytes 6.2 %    % Eosinophils 1.1 %    % Basophils 0.8 %    % Immature Granulocytes 0.5 %    Nucleated RBCs 0 0 /100    Absolute Neutrophil 7.4 1.6 - 8.3 10e9/L    Absolute Lymphocytes 2.0 0.8 - 5.3 10e9/L    Absolute Monocytes 0.6 0.0 - 1.3 10e9/L    Absolute Eosinophils 0.1 0.0 - 0.7 10e9/L    Absolute Basophils 0.1 0.0 - 0.2 10e9/L    Abs Immature Granulocytes 0.1 0 - 0.4 10e9/L    Absolute Nucleated RBC 0.0    Comprehensive metabolic panel   Result Value Ref Range    Sodium 141 133 - 144 mmol/L    Potassium 4.2 3.4 - 5.3 mmol/L    Chloride 110 (H) 94 - 109 mmol/L    Carbon Dioxide 24 20 - 32 mmol/L    Anion Gap 7 3 - 14 mmol/L    Glucose 96 70 - 99 mg/dL    Urea Nitrogen 16 7 - 30 mg/dL    Creatinine 0.96 0.66 - 1.25 mg/dL    GFR Estimate >90 >60 mL/min/[1.73_m2]    GFR Estimate If Black >90 >60 mL/min/[1.73_m2]    Calcium 10.0 8.5 - 10.1 mg/dL    Bilirubin Total 0.4 0.2 - 1.3 mg/dL    Albumin 4.1 3.4 - 5.0 g/dL    Protein Total 7.4 6.8 - 8.8 g/dL    Alkaline Phosphatase 67 40 - 150 U/L    ALT 50 0 - 70 U/L    AST 30 0 - 45 U/L   Lipase   Result Value Ref Range    Lipase 71 (L) 73 - 393 U/L   CT Abdomen Pelvis w Contrast    Narrative    CT ABDOMEN AND PELVIS WITH CONTRAST   4/29/2019 6:05 PM     HISTORY: Fever and abdominal pain. Rule out abscess.    TECHNIQUE:   100 mL Isovue -370. Radiation dose for this scan was  reduced using automated exposure control, adjustment of the mA and/or  kV according to patient size, or iterative  reconstruction technique.    COMPARISON: None.    FINDINGS:   Abdomen:  The heart, liver, spleen, pancreas, gallbladder, kidneys and  adrenal glands appear normal except for a 0.6 cm stone in the lower  pole of the right kidney. No hydronephrosis. There is mild nonspecific  distention of small bowel loops in the left upper quadrant with  gradual transition to normal size small bowel loops anteriorly beneath  the left rectus abdominis muscle above the umbilicus. Findings suggest  ileus or gastroenteritis. The rest of the small bowel appears normal.  The colon and appendix appear normal. No adenopathy, free air or free  fluid.    Pelvis: The bladder, prostate and seminal vesicles appear normal.  Possible varicoceles. Lumbar degenerative changes.      Impression    IMPRESSION:  1. Mild nonspecific dilatation of the jejunum, possibly ileus or  gastroenteritis.  2. Otherwise normal CT scan of the abdomen and pelvis.    VLADIMIR VILLANUEVA MD       Medications   sodium chloride 0.9% infusion (has no administration in time range)   0.9% sodium chloride BOLUS (1,000 mLs Intravenous New Bag 4/29/19 1745)   ketorolac (TORADOL) injection 30 mg (30 mg Intravenous Given 4/29/19 1747)   ondansetron (ZOFRAN) injection 4 mg (4 mg Intravenous Given 4/29/19 1746)   iopamidol (ISOVUE-370) solution 100 mL (100 mLs Intravenous Given 4/29/19 1757)   Saline Flush (72 mLs Intravenous Given 4/29/19 1757)        Case reviewed with the surgeon on-call.  Nonspecific CT findings.  We are in agreement that he appears stable for discharge with supportive care and expectant management.      Assessments & Plan (with Medical Decision Making)   48-year-old male with poorly localized gradually worsening mid abdominal pain which began this morning and appears to be secondary to mild nonspecific dilation of proximal small bowel loops suggestive of gastroenteritis, or ileus.  He has no vomiting, fever, elevation of WBC or signs or symptoms of GI bleeding.  He  is comfortable discharge home with expectant management, supportive care and plan to return immediately should he develop vomiting, fever, symptoms of GI bleeding or worsening pain.  Rx Percocet use if needed for pain refractory to NSAID, and Zofran to use as needed.    I have reviewed the nursing notes.    I have reviewed the findings, diagnosis, plan and need for follow up with the patient.       Medication List      Started    ondansetron 4 MG ODT tab  Commonly known as:  ZOFRAN ODT  4 mg, Oral, EVERY 8 HOURS PRN     oxyCODONE-acetaminophen 5-325 MG tablet  Commonly known as:  PERCOCET  1-2 tablets, Oral, EVERY 4 HOURS PRN            Final diagnoses:   Abdominal pain, generalized       4/29/2019   [facility] EMERGENCY DEPARTMENT     Del Coleman MD  04/29/19 1938

## 2019-06-07 ENCOUNTER — OFFICE VISIT (OUTPATIENT)
Dept: FAMILY MEDICINE | Facility: CLINIC | Age: 49
End: 2019-06-07
Payer: COMMERCIAL

## 2019-06-07 VITALS
DIASTOLIC BLOOD PRESSURE: 82 MMHG | HEART RATE: 89 BPM | HEIGHT: 71 IN | SYSTOLIC BLOOD PRESSURE: 118 MMHG | RESPIRATION RATE: 16 BRPM | BODY MASS INDEX: 38.22 KG/M2 | OXYGEN SATURATION: 96 % | TEMPERATURE: 97.8 F | WEIGHT: 273 LBS

## 2019-06-07 DIAGNOSIS — M22.41 CHONDROMALACIA OF PATELLA, RIGHT: Primary | ICD-10-CM

## 2019-06-07 DIAGNOSIS — R53.83 FATIGUE, UNSPECIFIED TYPE: ICD-10-CM

## 2019-06-07 DIAGNOSIS — B35.4 TINEA CORPORIS: ICD-10-CM

## 2019-06-07 LAB
ALBUMIN SERPL-MCNC: 4 G/DL (ref 3.4–5)
ALP SERPL-CCNC: 78 U/L (ref 40–150)
ALT SERPL W P-5'-P-CCNC: 42 U/L (ref 0–70)
ANION GAP SERPL CALCULATED.3IONS-SCNC: 6 MMOL/L (ref 3–14)
AST SERPL W P-5'-P-CCNC: 25 U/L (ref 0–45)
BASOPHILS # BLD AUTO: 0.1 10E9/L (ref 0–0.2)
BASOPHILS NFR BLD AUTO: 0.6 %
BILIRUB SERPL-MCNC: 0.5 MG/DL (ref 0.2–1.3)
BUN SERPL-MCNC: 22 MG/DL (ref 7–30)
CALCIUM SERPL-MCNC: 9.5 MG/DL (ref 8.5–10.1)
CHLORIDE SERPL-SCNC: 108 MMOL/L (ref 94–109)
CO2 SERPL-SCNC: 25 MMOL/L (ref 20–32)
CREAT SERPL-MCNC: 1.21 MG/DL (ref 0.66–1.25)
DEPRECATED CALCIDIOL+CALCIFEROL SERPL-MC: 28 UG/L (ref 20–75)
DIFFERENTIAL METHOD BLD: NORMAL
EOSINOPHIL # BLD AUTO: 0.2 10E9/L (ref 0–0.7)
EOSINOPHIL NFR BLD AUTO: 2.9 %
ERYTHROCYTE [DISTWIDTH] IN BLOOD BY AUTOMATED COUNT: 13.9 % (ref 10–15)
GFR SERPL CREATININE-BSD FRML MDRD: 70 ML/MIN/{1.73_M2}
GLUCOSE SERPL-MCNC: 95 MG/DL (ref 70–99)
HCT VFR BLD AUTO: 45.9 % (ref 40–53)
HGB BLD-MCNC: 15.7 G/DL (ref 13.3–17.7)
LYMPHOCYTES # BLD AUTO: 2.1 10E9/L (ref 0.8–5.3)
LYMPHOCYTES NFR BLD AUTO: 26.9 %
MCH RBC QN AUTO: 30.8 PG (ref 26.5–33)
MCHC RBC AUTO-ENTMCNC: 34.2 G/DL (ref 31.5–36.5)
MCV RBC AUTO: 90 FL (ref 78–100)
MONOCYTES # BLD AUTO: 0.8 10E9/L (ref 0–1.3)
MONOCYTES NFR BLD AUTO: 10.6 %
NEUTROPHILS # BLD AUTO: 4.7 10E9/L (ref 1.6–8.3)
NEUTROPHILS NFR BLD AUTO: 59 %
PLATELET # BLD AUTO: 212 10E9/L (ref 150–450)
POTASSIUM SERPL-SCNC: 4 MMOL/L (ref 3.4–5.3)
PROT SERPL-MCNC: 7.3 G/DL (ref 6.8–8.8)
RBC # BLD AUTO: 5.09 10E12/L (ref 4.4–5.9)
SODIUM SERPL-SCNC: 139 MMOL/L (ref 133–144)
T4 FREE SERPL-MCNC: 1.06 NG/DL (ref 0.76–1.46)
TSH SERPL DL<=0.005 MIU/L-ACNC: 0.98 MU/L (ref 0.4–4)
WBC # BLD AUTO: 8 10E9/L (ref 4–11)

## 2019-06-07 PROCEDURE — 99213 OFFICE O/P EST LOW 20 MIN: CPT | Mod: 25 | Performed by: FAMILY MEDICINE

## 2019-06-07 PROCEDURE — 82306 VITAMIN D 25 HYDROXY: CPT | Performed by: FAMILY MEDICINE

## 2019-06-07 PROCEDURE — 80053 COMPREHEN METABOLIC PANEL: CPT | Performed by: FAMILY MEDICINE

## 2019-06-07 PROCEDURE — 84443 ASSAY THYROID STIM HORMONE: CPT | Performed by: FAMILY MEDICINE

## 2019-06-07 PROCEDURE — 84403 ASSAY OF TOTAL TESTOSTERONE: CPT | Performed by: FAMILY MEDICINE

## 2019-06-07 PROCEDURE — 36415 COLL VENOUS BLD VENIPUNCTURE: CPT | Performed by: FAMILY MEDICINE

## 2019-06-07 PROCEDURE — 84439 ASSAY OF FREE THYROXINE: CPT | Performed by: FAMILY MEDICINE

## 2019-06-07 PROCEDURE — 85025 COMPLETE CBC W/AUTO DIFF WBC: CPT | Performed by: FAMILY MEDICINE

## 2019-06-07 PROCEDURE — 20610 DRAIN/INJ JOINT/BURSA W/O US: CPT | Performed by: FAMILY MEDICINE

## 2019-06-07 RX ORDER — FLUCONAZOLE 150 MG/1
150 TABLET ORAL DAILY
Qty: 5 TABLET | Refills: 3 | Status: SHIPPED | OUTPATIENT
Start: 2019-06-07 | End: 2019-07-03

## 2019-06-07 RX ORDER — TRIAMCINOLONE ACETONIDE 40 MG/ML
40 INJECTION, SUSPENSION INTRA-ARTICULAR; INTRAMUSCULAR ONCE
Status: CANCELLED | OUTPATIENT
Start: 2019-06-07 | End: 2019-06-07

## 2019-06-07 RX ORDER — TRIAMCINOLONE ACETONIDE 40 MG/ML
40 INJECTION, SUSPENSION INTRA-ARTICULAR; INTRAMUSCULAR ONCE
Status: COMPLETED | OUTPATIENT
Start: 2019-06-07 | End: 2019-06-07

## 2019-06-07 RX ADMIN — TRIAMCINOLONE ACETONIDE 40 MG: 40 INJECTION, SUSPENSION INTRA-ARTICULAR; INTRAMUSCULAR at 11:25

## 2019-06-07 ASSESSMENT — MIFFLIN-ST. JEOR: SCORE: 2125.45

## 2019-06-07 NOTE — NURSING NOTE
"Initial /82   Pulse 89   Temp 97.8  F (36.6  C) (Tympanic)   Resp 16   Ht 1.803 m (5' 11\")   Wt 123.8 kg (273 lb)   SpO2 96%   BMI 38.08 kg/m   Estimated body mass index is 38.08 kg/m  as calculated from the following:    Height as of this encounter: 1.803 m (5' 11\").    Weight as of this encounter: 123.8 kg (273 lb). .    Bhumika Marrufo CMA ([facility])  "

## 2019-06-07 NOTE — PROGRESS NOTES
"Subjective     Alen Gibbs is a 48 year old male who presents to clinic today for the following health issues:    HPI   Medication request       Description (location/character/radiation): he would like a prescription for Diflucan. Pt states he has a PRN order in his medication list. I did not see this there.      Blood Draw       Description (location/character/radiation): he would like his testosterone and vitamin D levels checked.        Current Outpatient Medications:      Acetaminophen (TYLENOL PO), Take 1,300 mg by mouth every 8 hours as needed for mild pain or fever, Disp: , Rfl:      allopurinol (ZYLOPRIM) 300 MG tablet, Take 1 tablet (300 mg) by mouth daily, Disp: 90 tablet, Rfl: 3     IBUPROFEN PO, Take 800 mg by mouth every 8 hours as needed for moderate pain, Disp: , Rfl:      lisinopril (PRINIVIL/ZESTRIL) 10 MG tablet, Take 1 tablet (10 mg) by mouth daily, Disp: 90 tablet, Rfl: 3    Patient Active Problem List   Diagnosis     CARDIOVASCULAR SCREENING; LDL GOAL LESS THAN 130     Tinea versicolor     Essential hypertension with goal blood pressure less than 130/80     Chondromalacia of patella, right     Obesity (BMI 35.0-39.9) with comorbidity (H)       Blood pressure 118/82, pulse 89, temperature 97.8  F (36.6  C), temperature source Tympanic, resp. rate 16, height 1.803 m (5' 11\"), weight 123.8 kg (273 lb), SpO2 96 %.    Exam:  GENERAL APPEARANCE: healthy, alert and no distress  MS: tender to palpation of the right patella  SKIN: tinea noted on the trunk.   PSYCH: mentation appears normal and affect normal/bright      (M22.41) Chondromalacia of patella, right  (primary encounter diagnosis)  Comment:   Plan: DRAIN/INJECT LARGE JOINT/BURSA, triamcinolone         (KENALOG-40) injection 40 mg        We discussed the options and will use injection of 1 cc of Kenalog-40, with 1 cc of 1% Lidocaine injected in a sterile manner, into the right knee medial and superior compartment.   Use ice and advil and " Tylenol as needed. Modify activities and try to avoid squatting and kneeling. Recheck as needed.     (R53.83) Fatigue, unspecified type  Comment:   Plan: Comprehensive metabolic panel, CBC with         platelets differential, TSH, T4 FREE, Vitamin D        Deficiency, Testosterone, total        Do the above labs. If these are normal then recheck and discuss further evaluation.     (B35.4) Tinea corporis  Comment:   Plan: fluconazole (DIFLUCAN) 150 MG tablet        Use the med at 150 mg daily for a few days, til better.   Refills are done.       Nito Pruitt

## 2019-06-07 NOTE — LETTER
June 12, 2019      Alen Gibbs  [address] [facility] [address]        Dear ,    We are writing to inform you of your test results.  Normal testing. The Testosterone is technically normal but is on the lower side. This could cause fatigue. Insurance would not cover replacement therapy, but you could check into the cost and if if interested see me in clinic to discuss this.     Resulted Orders   Comprehensive metabolic panel   Result Value Ref Range    Sodium 139 133 - 144 mmol/L    Potassium 4.0 3.4 - 5.3 mmol/L    Chloride 108 94 - 109 mmol/L    Carbon Dioxide 25 20 - 32 mmol/L    Anion Gap 6 3 - 14 mmol/L    Glucose 95 70 - 99 mg/dL    Urea Nitrogen 22 7 - 30 mg/dL    Creatinine 1.21 0.66 - 1.25 mg/dL    GFR Estimate 70 >60 mL/min/[1.73_m2]      Comment:      Non  GFR Calc  Starting 12/18/2018, serum creatinine based estimated GFR (eGFR) will be   calculated using the Chronic Kidney Disease Epidemiology Collaboration   (CKD-EPI) equation.      GFR Estimate If Black 81 >60 mL/min/[1.73_m2]      Comment:       GFR Calc  Starting 12/18/2018, serum creatinine based estimated GFR (eGFR) will be   calculated using the Chronic Kidney Disease Epidemiology Collaboration   (CKD-EPI) equation.      Calcium 9.5 8.5 - 10.1 mg/dL    Bilirubin Total 0.5 0.2 - 1.3 mg/dL    Albumin 4.0 3.4 - 5.0 g/dL    Protein Total 7.3 6.8 - 8.8 g/dL    Alkaline Phosphatase 78 40 - 150 U/L    ALT 42 0 - 70 U/L    AST 25 0 - 45 U/L   CBC with platelets differential   Result Value Ref Range    WBC 8.0 4.0 - 11.0 10e9/L    RBC Count 5.09 4.4 - 5.9 10e12/L    Hemoglobin 15.7 13.3 - 17.7 g/dL    Hematocrit 45.9 40.0 - 53.0 %    MCV 90 78 - 100 fl    MCH 30.8 26.5 - 33.0 pg    MCHC 34.2 31.5 - 36.5 g/dL    RDW 13.9 10.0 - 15.0 %    Platelet Count 212 150 - 450 10e9/L    % Neutrophils 59.0 %    % Lymphocytes 26.9 %    % Monocytes 10.6 %    % Eosinophils 2.9 %    % Basophils 0.6 %    Absolute  Neutrophil 4.7 1.6 - 8.3 10e9/L    Absolute Lymphocytes 2.1 0.8 - 5.3 10e9/L    Absolute Monocytes 0.8 0.0 - 1.3 10e9/L    Absolute Eosinophils 0.2 0.0 - 0.7 10e9/L    Absolute Basophils 0.1 0.0 - 0.2 10e9/L    Diff Method Automated Method    TSH   Result Value Ref Range    TSH 0.98 0.40 - 4.00 mU/L   T4 FREE   Result Value Ref Range    T4 Free 1.06 0.76 - 1.46 ng/dL   Vitamin D Deficiency   Result Value Ref Range    Vitamin D Deficiency screening 28 20 - 75 ug/L      Comment:      Season, race, dietary intake, and treatment affect the concentration of   25-hydroxy-Vitamin D. Values may decrease during winter months and increase   during summer months. Values 20-29 ug/L may indicate Vitamin D insufficiency   and values <20 ug/L may indicate Vitamin D deficiency.  Vitamin D determination is routinely performed by an immunoassay specific for   25 hydroxyvitamin D3.  If an individual is on vitamin D2 (ergocalciferol)   supplementation, please specify 25 OH vitamin D2 and D3 level determination by   LCMSMS test [id number].     Testosterone, total   Result Value Ref Range    Testosterone Total 263 240 - 950 ng/dL      Comment:      This test was developed and its performance characteristics determined by the   [facility],  Special [facility] Laboratory. It has   not been cleared or approved by the FDA. The laboratory is regulated under   CLIA as qualified to perform high-complexity testing. This test is used for   clinical purposes. It should not be regarded as investigational or for   research.         If you have any questions or concerns, please call the clinic at the number listed above.       Sincerely,        Nito Pruitt MD/bmc

## 2019-06-07 NOTE — PATIENT INSTRUCTIONS
(M22.41) Chondromalacia of patella, right  (primary encounter diagnosis)  Comment:   Plan: DRAIN/INJECT LARGE JOINT/BURSA, triamcinolone         (KENALOG-40) injection 40 mg        We discussed the options and will use injection of 1 cc of Kenalog-40, with 1 cc of 1% Lidocaine injected in a sterile manner, into the right knee medial and superior compartment.   Use ice and advil and Tylenol as needed. Modify activities and try to avoid squatting and kneeling. Recheck as needed.     (R53.83) Fatigue, unspecified type  Comment:   Plan: Comprehensive metabolic panel, CBC with         platelets differential, TSH, T4 FREE, Vitamin D        Deficiency, Testosterone, total        Do the above labs. If these are normal then recheck and discuss further evaluation.     (B35.4) Tinea corporis  Comment:   Plan: fluconazole (DIFLUCAN) 150 MG tablet        Use the med at 150 mg daily for a few days, til better.   Refills are done.

## 2019-06-12 LAB — TESTOST SERPL-MCNC: 263 NG/DL (ref 240–950)

## 2019-07-03 ENCOUNTER — OFFICE VISIT (OUTPATIENT)
Dept: FAMILY MEDICINE | Facility: CLINIC | Age: 49
End: 2019-07-03
Payer: COMMERCIAL

## 2019-07-03 ENCOUNTER — INFUSION THERAPY VISIT (OUTPATIENT)
Dept: INFUSION THERAPY | Facility: CLINIC | Age: 49
End: 2019-07-03
Attending: FAMILY MEDICINE
Payer: COMMERCIAL

## 2019-07-03 VITALS — HEART RATE: 61 BPM | SYSTOLIC BLOOD PRESSURE: 127 MMHG | DIASTOLIC BLOOD PRESSURE: 84 MMHG

## 2019-07-03 VITALS
DIASTOLIC BLOOD PRESSURE: 78 MMHG | HEART RATE: 68 BPM | RESPIRATION RATE: 14 BRPM | OXYGEN SATURATION: 97 % | WEIGHT: 270 LBS | HEIGHT: 71 IN | BODY MASS INDEX: 37.8 KG/M2 | TEMPERATURE: 98.8 F | SYSTOLIC BLOOD PRESSURE: 100 MMHG

## 2019-07-03 DIAGNOSIS — E86.0 DEHYDRATION: Primary | ICD-10-CM

## 2019-07-03 DIAGNOSIS — R53.1 WEAKNESS: ICD-10-CM

## 2019-07-03 DIAGNOSIS — K92.1 HEMATOCHEZIA: ICD-10-CM

## 2019-07-03 LAB
ANION GAP SERPL CALCULATED.3IONS-SCNC: 7 MMOL/L (ref 3–14)
BASOPHILS # BLD AUTO: 0.1 10E9/L (ref 0–0.2)
BASOPHILS NFR BLD AUTO: 0.7 %
BUN SERPL-MCNC: 22 MG/DL (ref 7–30)
CALCIUM SERPL-MCNC: 9.3 MG/DL (ref 8.5–10.1)
CHLORIDE SERPL-SCNC: 109 MMOL/L (ref 94–109)
CO2 SERPL-SCNC: 25 MMOL/L (ref 20–32)
CREAT SERPL-MCNC: 1.16 MG/DL (ref 0.66–1.25)
DIFFERENTIAL METHOD BLD: NORMAL
EOSINOPHIL # BLD AUTO: 0.2 10E9/L (ref 0–0.7)
EOSINOPHIL NFR BLD AUTO: 2.3 %
ERYTHROCYTE [DISTWIDTH] IN BLOOD BY AUTOMATED COUNT: 13.8 % (ref 10–15)
GFR SERPL CREATININE-BSD FRML MDRD: 73 ML/MIN/{1.73_M2}
GLUCOSE SERPL-MCNC: 90 MG/DL (ref 70–99)
HCT VFR BLD AUTO: 45.2 % (ref 40–53)
HGB BLD-MCNC: 14.9 G/DL (ref 13.3–17.7)
LYMPHOCYTES # BLD AUTO: 2.4 10E9/L (ref 0.8–5.3)
LYMPHOCYTES NFR BLD AUTO: 29 %
MCH RBC QN AUTO: 30.3 PG (ref 26.5–33)
MCHC RBC AUTO-ENTMCNC: 33 G/DL (ref 31.5–36.5)
MCV RBC AUTO: 92 FL (ref 78–100)
MONOCYTES # BLD AUTO: 0.8 10E9/L (ref 0–1.3)
MONOCYTES NFR BLD AUTO: 9.3 %
NEUTROPHILS # BLD AUTO: 4.9 10E9/L (ref 1.6–8.3)
NEUTROPHILS NFR BLD AUTO: 58.7 %
PLATELET # BLD AUTO: 176 10E9/L (ref 150–450)
POTASSIUM SERPL-SCNC: 4.1 MMOL/L (ref 3.4–5.3)
RBC # BLD AUTO: 4.92 10E12/L (ref 4.4–5.9)
SODIUM SERPL-SCNC: 141 MMOL/L (ref 133–144)
WBC # BLD AUTO: 8.3 10E9/L (ref 4–11)

## 2019-07-03 PROCEDURE — 96374 THER/PROPH/DIAG INJ IV PUSH: CPT

## 2019-07-03 PROCEDURE — 36415 COLL VENOUS BLD VENIPUNCTURE: CPT | Performed by: FAMILY MEDICINE

## 2019-07-03 PROCEDURE — 80048 BASIC METABOLIC PNL TOTAL CA: CPT | Performed by: FAMILY MEDICINE

## 2019-07-03 PROCEDURE — 85025 COMPLETE CBC W/AUTO DIFF WBC: CPT | Performed by: FAMILY MEDICINE

## 2019-07-03 PROCEDURE — 25000128 H RX IP 250 OP 636: Performed by: FAMILY MEDICINE

## 2019-07-03 PROCEDURE — 99214 OFFICE O/P EST MOD 30 MIN: CPT | Performed by: FAMILY MEDICINE

## 2019-07-03 RX ADMIN — SODIUM CHLORIDE 1000 ML: 9 INJECTION, SOLUTION INTRAVENOUS at 10:21

## 2019-07-03 ASSESSMENT — MIFFLIN-ST. JEOR: SCORE: 2111.84

## 2019-07-03 NOTE — PROGRESS NOTES
Subjective     Alen Gibbs is a 48 year old male who presents to clinic today for the following health issues:    Patient is a 48-year-old male who comes in today for fatigue and one episode of vomiting of blood.  He reports that last weekend he was out kayaking and had a couple of beers and some fish and then subsequently had some vomiting episodes including one that was bloody.  He denies any history of GERD he did smoke back in the day and has stopped smoking many years ago.  He feels like the vomiting was caused by the fish he ate and the couple of beers he had .He was also exposed to the the sun and he believes he probably has some dehydration .  Since then though he has not been able to get his energy back he says very fatigued and tired. His blood pressure is on the low end of normal today and he denies any dizziness or fainting spells no chest pain or shortness of breath he has not had any further episodes of the vomiting of blood.    HPI   Concern - episodes of vomiting blood, dehydration, and still very fatigue   Onset: 6-29-19    Description:   Patient was outside all day on 6-29 was very hot that day was drinking moderate amount of beer with no urination most of the day also ate maybe some bad fish. He had a few episodes of vomiting with moderate amount of blood, still feeling  nausea dehydrated and very fatigued, has not had vomiting since that day. ? maybe  Ulcer  Or still dehydrated     Intensity: moderate    Progression of Symptoms:  Improving but still having slight abdominal sx and very fatigue     Accompanying Signs & Symptoms:  Patient was outside all on 6-29 and felt nausea, dehydrated      Previous history of similar problem:   None     Precipitating factors:   Worsened by: throughout the day with taking in more liquid (beer) and he believes he could of ate some bad fish     Alleviating factors:  Improved by: patient has been trying to intake lots more water     Therapies Tried and outcome:  same       Patient Active Problem List   Diagnosis     CARDIOVASCULAR SCREENING; LDL GOAL LESS THAN 130     Tinea versicolor     Essential hypertension with goal blood pressure less than 130/80     Chondromalacia of patella, right     Obesity (BMI 35.0-39.9) with comorbidity (H)     Tinea corporis     Dehydration     Past Surgical History:   Procedure Laterality Date     ARTHROTOMY WRIST Right 6/10/2016    Procedure: ARTHROTOMY WRIST;  Surgeon: Toni Ewing MD;  Location: [facility] OR     BONE MARROW ASPIRATION ONLY         Social History     Tobacco Use     Smoking status: Never Smoker     Smokeless tobacco: Never Used   Substance Use Topics     Alcohol use: Yes     Comment: occ     Family History   Problem Relation Age of Onset     Breast Cancer Mother      Cardiovascular Father         CHF     Diabetes Father 66        Type II     Alzheimer Disease Maternal Grandmother      Respiratory Paternal Grandfather         emphysema     Blood Disease Brother 4        leukemia     Diabetes Daughter         type I         Current Outpatient Medications   Medication Sig Dispense Refill     Acetaminophen (TYLENOL PO) Take 1,300 mg by mouth every 8 hours as needed for mild pain or fever       allopurinol (ZYLOPRIM) 300 MG tablet Take 1 tablet (300 mg) by mouth daily 90 tablet 3     IBUPROFEN PO Take 800 mg by mouth every 8 hours as needed for moderate pain       lisinopril (PRINIVIL/ZESTRIL) 10 MG tablet Take 1 tablet (10 mg) by mouth daily 90 tablet 3     Allergies   Allergen Reactions     Norco [Hydrocodone-Acetaminophen] Nausea     Simvastatin Other (See Comments)     fasciatus     BP Readings from Last 3 Encounters:   07/03/19 100/78   06/07/19 118/82   04/29/19 123/89    Wt Readings from Last 3 Encounters:   07/03/19 122.5 kg (270 lb)   06/07/19 123.8 kg (273 lb)   04/29/19 117.9 kg (260 lb)                      Reviewed and updated as needed this visit by Provider         Review of Systems   ROS COMP: Constitutional,  "HEENT, cardiovascular, pulmonary, gi and gu systems are negative, except as otherwise noted.      Objective    /78   Pulse 68   Temp 98.8  F (37.1  C) (Tympanic)   Resp 14   Ht 1.803 m (5' 11\")   Wt 122.5 kg (270 lb)   SpO2 97%   BMI 37.66 kg/m    Body mass index is 37.66 kg/m .  Physical Exam   GENERAL: healthy, alert and no distress  EYES: Eyes grossly normal to inspection, PERRL and conjunctivae and sclerae normal  HENT: ear canals and TM's normal, nose and mouth without ulcers or lesions  NECK: no adenopathy, no asymmetry, masses, or scars and thyroid normal to palpation  RESP: lungs clear to auscultation - no rales, rhonchi or wheezes  CV: regular rate and rhythm, normal S1 S2, no S3 or S4, no murmur, click or rub, no peripheral edema and peripheral pulses strong  ABDOMEN: soft, nontender, no hepatosplenomegaly, no masses and bowel sounds normal  MS: no gross musculoskeletal defects noted, no edema    Diagnostic Test Results:      Results for orders placed or performed in visit on 07/03/19   CBC with platelets differential   Result Value Ref Range    WBC 8.3 4.0 - 11.0 10e9/L    RBC Count 4.92 4.4 - 5.9 10e12/L    Hemoglobin 14.9 13.3 - 17.7 g/dL    Hematocrit 45.2 40.0 - 53.0 %    MCV 92 78 - 100 fl    MCH 30.3 26.5 - 33.0 pg    MCHC 33.0 31.5 - 36.5 g/dL    RDW 13.8 10.0 - 15.0 %    Platelet Count 176 150 - 450 10e9/L    % Neutrophils 58.7 %    % Lymphocytes 29.0 %    % Monocytes 9.3 %    % Eosinophils 2.3 %    % Basophils 0.7 %    Absolute Neutrophil 4.9 1.6 - 8.3 10e9/L    Absolute Lymphocytes 2.4 0.8 - 5.3 10e9/L    Absolute Monocytes 0.8 0.0 - 1.3 10e9/L    Absolute Eosinophils 0.2 0.0 - 0.7 10e9/L    Absolute Basophils 0.1 0.0 - 0.2 10e9/L    Diff Method Automated Method      Results for orders placed or performed in visit on 07/03/19   CBC with platelets differential   Result Value Ref Range    WBC 8.3 4.0 - 11.0 10e9/L    RBC Count 4.92 4.4 - 5.9 10e12/L    Hemoglobin 14.9 13.3 - 17.7 g/dL "    Hematocrit 45.2 40.0 - 53.0 %    MCV 92 78 - 100 fl    MCH 30.3 26.5 - 33.0 pg    MCHC 33.0 31.5 - 36.5 g/dL    RDW 13.8 10.0 - 15.0 %    Platelet Count 176 150 - 450 10e9/L    % Neutrophils 58.7 %    % Lymphocytes 29.0 %    % Monocytes 9.3 %    % Eosinophils 2.3 %    % Basophils 0.7 %    Absolute Neutrophil 4.9 1.6 - 8.3 10e9/L    Absolute Lymphocytes 2.4 0.8 - 5.3 10e9/L    Absolute Monocytes 0.8 0.0 - 1.3 10e9/L    Absolute Eosinophils 0.2 0.0 - 0.7 10e9/L    Absolute Basophils 0.1 0.0 - 0.2 10e9/L    Diff Method Automated Method    Basic metabolic panel   Result Value Ref Range    Sodium 141 133 - 144 mmol/L    Potassium 4.1 3.4 - 5.3 mmol/L    Chloride 109 94 - 109 mmol/L    Carbon Dioxide 25 20 - 32 mmol/L    Anion Gap 7 3 - 14 mmol/L    Glucose 90 70 - 99 mg/dL    Urea Nitrogen 22 7 - 30 mg/dL    Creatinine 1.16 0.66 - 1.25 mg/dL    GFR Estimate 73 >60 mL/min/[1.73_m2]    GFR Estimate If Black 85 >60 mL/min/[1.73_m2]    Calcium 9.3 8.5 - 10.1 mg/dL       Assessment & Plan     (E86.0) Dehydration  (primary encounter diagnosis)  Comment: Patient will be notified of labs  Plan: CBC with platelets differential, Basic         metabolic panel            (R53.1) Weakness  Comment: Likely due to dehydration   Plan: CBC with platelets differential, Basic         metabolic panel            (K92.1) Hematochezia  Comment: We will keep a close eye. May have been due to alll the emesis that he had that day .   Plan: If this happens again consider imaging        Patient was given IV fluids at the infusion center.       FUTURE APPOINTMENTS:       - Follow-up visit in one week if symptoms persist     No follow-ups on file.    Mina Garcia MD  [facility] [place]

## 2019-07-03 NOTE — LETTER
July 5, 2019      Alen CT Guzmanr  [address] [address]        Dear ,    We are writing to inform you of your test results. Your recent lab results are within normal limits. Follow up in clinic as needed.     Resulted Orders   CBC with platelets differential   Result Value Ref Range    WBC 8.3 4.0 - 11.0 10e9/L    RBC Count 4.92 4.4 - 5.9 10e12/L    Hemoglobin 14.9 13.3 - 17.7 g/dL    Hematocrit 45.2 40.0 - 53.0 %    MCV 92 78 - 100 fl    MCH 30.3 26.5 - 33.0 pg    MCHC 33.0 31.5 - 36.5 g/dL    RDW 13.8 10.0 - 15.0 %    Platelet Count 176 150 - 450 10e9/L    % Neutrophils 58.7 %    % Lymphocytes 29.0 %    % Monocytes 9.3 %    % Eosinophils 2.3 %    % Basophils 0.7 %    Absolute Neutrophil 4.9 1.6 - 8.3 10e9/L    Absolute Lymphocytes 2.4 0.8 - 5.3 10e9/L    Absolute Monocytes 0.8 0.0 - 1.3 10e9/L    Absolute Eosinophils 0.2 0.0 - 0.7 10e9/L    Absolute Basophils 0.1 0.0 - 0.2 10e9/L    Diff Method Automated Method    Basic metabolic panel   Result Value Ref Range    Sodium 141 133 - 144 mmol/L    Potassium 4.1 3.4 - 5.3 mmol/L    Chloride 109 94 - 109 mmol/L    Carbon Dioxide 25 20 - 32 mmol/L    Anion Gap 7 3 - 14 mmol/L    Glucose 90 70 - 99 mg/dL      Comment:      Non Fasting    Urea Nitrogen 22 7 - 30 mg/dL    Creatinine 1.16 0.66 - 1.25 mg/dL    GFR Estimate 73 >60 mL/min/[1.73_m2]      Comment:      Non  GFR Calc  Starting 12/18/2018, serum creatinine based estimated GFR (eGFR) will be   calculated using the Chronic Kidney Disease Epidemiology Collaboration   (CKD-EPI) equation.      GFR Estimate If Black 85 >60 mL/min/[1.73_m2]      Comment:       GFR Calc  Starting 12/18/2018, serum creatinine based estimated GFR (eGFR) will be   calculated using the Chronic Kidney Disease Epidemiology Collaboration   (CKD-EPI) equation.      Calcium 9.3 8.5 - 10.1 mg/dL       If you have any questions or concerns, please call the clinic at the number listed  above.       Sincerely,        Mina Garcia MD

## 2019-07-03 NOTE — PROGRESS NOTES
Infusion Nursing Note:  Alen Gibbs presents today for IV fluids.    Patient seen by provider today: No   present during visit today: Not Applicable.    Note: Referred by Dr. Garcia for IV fluids today.      Intravenous Access:  Peripheral IV placed.    Treatment Conditions:  Not Applicable.      Post Infusion Assessment:  Patient tolerated infusion without incident.       Discharge Plan:   Patient discharged in stable condition accompanied by: self.  Departure Mode: Ambulatory.    Esthela Velázquez RN

## 2019-07-03 NOTE — PATIENT INSTRUCTIONS
Thank you for choosing [facility].  You may be receiving an email and/or telephone survey request from [facility] Customer Experience regarding your visit today.  Please take a few minutes to respond to the survey to let us know how we are doing.      If you have questions or concerns, please contact us via [employer] or you can contact your care team at [phone].    Our Clinic hours are:  Monday 6:40 am  to 7:00 pm  Tuesday -Friday 6:40 am to 5:00 pm    The [place] outpatient lab hours are:  Monday - Friday 6:10 am to 4:45 pm  Saturdays 7:00 am to 11:00 am  Appointments are required, call [phone]    If you have clinical questions after hours or would like to schedule an appointment,  call the clinic at [phone].

## 2019-07-03 NOTE — RESULT ENCOUNTER NOTE
Please inform patient that test result was within normal parameters.   Thank you.     Mina Garcia M.D.

## 2019-08-14 ENCOUNTER — OFFICE VISIT (OUTPATIENT)
Dept: DERMATOLOGY | Facility: CLINIC | Age: 49
End: 2019-08-14
Payer: COMMERCIAL

## 2019-08-14 VITALS — OXYGEN SATURATION: 95 % | DIASTOLIC BLOOD PRESSURE: 81 MMHG | SYSTOLIC BLOOD PRESSURE: 123 MMHG | HEART RATE: 88 BPM

## 2019-08-14 DIAGNOSIS — L82.1 SEBORRHEIC KERATOSIS: Primary | ICD-10-CM

## 2019-08-14 PROCEDURE — 99213 OFFICE O/P EST LOW 20 MIN: CPT | Performed by: PHYSICIAN ASSISTANT

## 2019-08-14 NOTE — LETTER
8/14/2019         RE: Alen Gibbs  [address] [facility] [address]        Dear Colleague,    Thank you for referring your patient, Alen Gibbs, to the [facility]. Please see a copy of my visit note below.    Alen Gibbs is a 48 year old year old male patient here today for spot on forehead and flank. He reports spots have been present for few years, denies any changes since they first appeared. Denies any pain or bleeding. Patient has no other skin complaints today.  Remainder of the HPI, Meds, PMH, Allergies, FH, and SH was reviewed in chart.    Pertinent Hx:   No personal history of skin cancer.   Past Medical History:   Diagnosis Date     Hyperlipidemia      Syncope and collapse        Past Surgical History:   Procedure Laterality Date     ARTHROTOMY WRIST Right 6/10/2016    Procedure: ARTHROTOMY WRIST;  Surgeon: Toni Ewing MD;  Location: [facility] OR     BONE MARROW ASPIRATION ONLY          Family History   Problem Relation Age of Onset     Breast Cancer Mother      Cardiovascular Father         CHF     Diabetes Father 66        Type II     Alzheimer Disease Maternal Grandmother      Respiratory Paternal Grandfather         emphysema     Blood Disease Brother 4        leukemia     Diabetes Daughter         type I       Social History     Socioeconomic History     Marital status:      Spouse name: Not on file     Number of children: Not on file     Years of education: Not on file     Highest education level: Not on file   Occupational History     Not on file   Social Needs     Financial resource strain: Not on file     Food insecurity:     Worry: Not on file     Inability: Not on file     Transportation needs:     Medical: Not on file     Non-medical: Not on file   Tobacco Use     Smoking status: Never Smoker     Smokeless tobacco: Never Used   Substance and Sexual Activity     Alcohol use: Yes     Comment: occ     Drug use: No     Sexual activity: Yes     Partners:  Female   Lifestyle     Physical activity:     Days per week: Not on file     Minutes per session: Not on file     Stress: Not on file   Relationships     Social connections:     Talks on phone: Not on file     Gets together: Not on file     Attends [employer] service: Not on file     Active member of club or organization: Not on file     Attends meetings of clubs or organizations: Not on file     Relationship status: Not on file     Intimate partner violence:     Fear of current or ex partner: Not on file     Emotionally abused: Not on file     Physically abused: Not on file     Forced sexual activity: Not on file   Other Topics Concern     Parent/sibling w/ CABG, MI or angioplasty before 65F 55M? No   Social History Narrative     Not on file       Outpatient Encounter Medications as of 8/14/2019   Medication Sig Dispense Refill     Acetaminophen (TYLENOL PO) Take 1,300 mg by mouth every 8 hours as needed for mild pain or fever       allopurinol (ZYLOPRIM) 300 MG tablet Take 1 tablet (300 mg) by mouth daily 90 tablet 3     IBUPROFEN PO Take 800 mg by mouth every 8 hours as needed for moderate pain       lisinopril (PRINIVIL/ZESTRIL) 10 MG tablet Take 1 tablet (10 mg) by mouth daily 90 tablet 3     No facility-administered encounter medications on file as of 8/14/2019.              Review Of Systems  Skin: As above  Eyes: negative  Ears/Nose/Throat: negative  Respiratory: No shortness of breath, dyspnea on exertion, cough, or hemoptysis  Cardiovascular: negative  Gastrointestinal: negative  Genitourinary: negative  Musculoskeletal: negative  Neurologic: negative  Psychiatric: negative  Hematologic/Lymphatic/Immunologic: negative  Endocrine: negative      O:   NAD, WDWN, Alert & Oriented, Mood & Affect wnl, Vitals stable   Here today alone   /81   Pulse 88   SpO2 95%    General appearance normal   Vitals stable   Alert, oriented and in no acute distress     Brown stuck on scaly papules on right side of  flank  Brown thin plaque on superior forehead, slightly darker on right border      Eyes: Conjunctivae/lids:Normal     ENT: Lips: normal    MSK:Normal    Pulm: Breathing Normal    Neuro/Psych: Orientation:Normal; Mood/Affect:Normal  A/P:  1. Seborrheic keratosis  With slight variation in color on spot on forehead, recommend biopsy, patient did not want to at this time, and prefers to watch. Will return if any changes.   BENIGN LESIONS DISCUSSED WITH PATIENT:  I discussed the specifics of tumor, prognosis, and genetics of benign lesions.  I explained that treatment of these lesions would be purely cosmetic and not medically neccessary.  I discussed with patient different removal options including excision, cautery and /or laser.      Nature and genetics of benign skin lesions dicussed with patient.  Signs and Symptoms of skin cancer discussed with patient.  ABCDEs of melanoma reviewed with patient.  Patient encouraged to perform monthly skin exams.  UV precautions reviewed with patient.  Risks of non-melanoma skin cancer discussed with patient   Return to clinic as needed.       Again, thank you for allowing me to participate in the care of your patient.        Sincerely,        Lilia Moss PA-C

## 2019-08-14 NOTE — PROGRESS NOTES
Alen Gibbs is a 48 year old year old male patient here today for spot on forehead and flank. He reports spots have been present for few years, denies any changes since they first appeared. Denies any pain or bleeding. Patient has no other skin complaints today.  Remainder of the HPI, Meds, PMH, Allergies, FH, and SH was reviewed in chart.    Pertinent Hx:   No personal history of skin cancer.   Past Medical History:   Diagnosis Date     Hyperlipidemia      Syncope and collapse        Past Surgical History:   Procedure Laterality Date     ARTHROTOMY WRIST Right 6/10/2016    Procedure: ARTHROTOMY WRIST;  Surgeon: Toni Ewing MD;  Location: [facility] OR     BONE MARROW ASPIRATION ONLY          Family History   Problem Relation Age of Onset     Breast Cancer Mother      Cardiovascular Father         CHF     Diabetes Father 66        Type II     Alzheimer Disease Maternal Grandmother      Respiratory Paternal Grandfather         emphysema     Blood Disease Brother 4        leukemia     Diabetes Daughter         type I       Social History     Socioeconomic History     Marital status:      Spouse name: Not on file     Number of children: Not on file     Years of education: Not on file     Highest education level: Not on file   Occupational History     Not on file   Social Needs     Financial resource strain: Not on file     Food insecurity:     Worry: Not on file     Inability: Not on file     Transportation needs:     Medical: Not on file     Non-medical: Not on file   Tobacco Use     Smoking status: Never Smoker     Smokeless tobacco: Never Used   Substance and Sexual Activity     Alcohol use: Yes     Comment: occ     Drug use: No     Sexual activity: Yes     Partners: Female   Lifestyle     Physical activity:     Days per week: Not on file     Minutes per session: Not on file     Stress: Not on file   Relationships     Social connections:     Talks on phone: Not on file     Gets together: Not on file      Attends [employer] service: Not on file     Active member of club or organization: Not on file     Attends meetings of clubs or organizations: Not on file     Relationship status: Not on file     Intimate partner violence:     Fear of current or ex partner: Not on file     Emotionally abused: Not on file     Physically abused: Not on file     Forced sexual activity: Not on file   Other Topics Concern     Parent/sibling w/ CABG, MI or angioplasty before 65F 55M? No   Social History Narrative     Not on file       Outpatient Encounter Medications as of 8/14/2019   Medication Sig Dispense Refill     Acetaminophen (TYLENOL PO) Take 1,300 mg by mouth every 8 hours as needed for mild pain or fever       allopurinol (ZYLOPRIM) 300 MG tablet Take 1 tablet (300 mg) by mouth daily 90 tablet 3     IBUPROFEN PO Take 800 mg by mouth every 8 hours as needed for moderate pain       lisinopril (PRINIVIL/ZESTRIL) 10 MG tablet Take 1 tablet (10 mg) by mouth daily 90 tablet 3     No facility-administered encounter medications on file as of 8/14/2019.              Review Of Systems  Skin: As above  Eyes: negative  Ears/Nose/Throat: negative  Respiratory: No shortness of breath, dyspnea on exertion, cough, or hemoptysis  Cardiovascular: negative  Gastrointestinal: negative  Genitourinary: negative  Musculoskeletal: negative  Neurologic: negative  Psychiatric: negative  Hematologic/Lymphatic/Immunologic: negative  Endocrine: negative      O:   NAD, WDWN, Alert & Oriented, Mood & Affect wnl, Vitals stable   Here today alone   /81   Pulse 88   SpO2 95%    General appearance normal   Vitals stable   Alert, oriented and in no acute distress     Brown stuck on scaly papules on right side of flank  Brown thin plaque on superior forehead, slightly darker on right border      Eyes: Conjunctivae/lids:Normal     ENT: Lips: normal    MSK:Normal    Pulm: Breathing Normal    Neuro/Psych: Orientation:Normal; Mood/Affect:Normal  A/P:  1.  Seborrheic keratosis  With slight variation in color on spot on forehead, recommend biopsy, patient did not want to at this time, and prefers to watch. Will return if any changes.   BENIGN LESIONS DISCUSSED WITH PATIENT:  I discussed the specifics of tumor, prognosis, and genetics of benign lesions.  I explained that treatment of these lesions would be purely cosmetic and not medically neccessary.  I discussed with patient different removal options including excision, cautery and /or laser.      Nature and genetics of benign skin lesions dicussed with patient.  Signs and Symptoms of skin cancer discussed with patient.  ABCDEs of melanoma reviewed with patient.  Patient encouraged to perform monthly skin exams.  UV precautions reviewed with patient.  Risks of non-melanoma skin cancer discussed with patient   Return to clinic as needed.

## 2019-11-02 ENCOUNTER — HEALTH MAINTENANCE LETTER (OUTPATIENT)
Age: 49
End: 2019-11-02

## 2019-11-25 ENCOUNTER — HOSPITAL ENCOUNTER (EMERGENCY)
Facility: CLINIC | Age: 49
Discharge: HOME | End: 2019-11-25
Attending: STUDENT IN AN ORGANIZED HEALTH CARE EDUCATION/TRAINING PROGRAM | Admitting: STUDENT IN AN ORGANIZED HEALTH CARE EDUCATION/TRAINING PROGRAM
Payer: COMMERCIAL

## 2019-11-25 ENCOUNTER — APPOINTMENT (OUTPATIENT)
Dept: MRI IMAGING | Facility: CLINIC | Age: 49
End: 2019-11-25
Attending: STUDENT IN AN ORGANIZED HEALTH CARE EDUCATION/TRAINING PROGRAM
Payer: COMMERCIAL

## 2019-11-25 VITALS
WEIGHT: 270 LBS | HEART RATE: 79 BPM | BODY MASS INDEX: 37.66 KG/M2 | SYSTOLIC BLOOD PRESSURE: 144 MMHG | OXYGEN SATURATION: 96 % | DIASTOLIC BLOOD PRESSURE: 91 MMHG | RESPIRATION RATE: 56 BRPM | TEMPERATURE: 98 F

## 2019-11-25 DIAGNOSIS — H53.123: ICD-10-CM

## 2019-11-25 LAB
ALBUMIN SERPL-MCNC: 3.5 G/DL (ref 3.4–5)
ALP SERPL-CCNC: 67 U/L (ref 40–150)
ALT SERPL W P-5'-P-CCNC: 52 U/L (ref 0–70)
ANION GAP SERPL CALCULATED.3IONS-SCNC: 7 MMOL/L (ref 3–14)
AST SERPL W P-5'-P-CCNC: 26 U/L (ref 0–45)
BASOPHILS # BLD AUTO: 0.1 10E9/L (ref 0–0.2)
BASOPHILS NFR BLD AUTO: 1.3 %
BILIRUB SERPL-MCNC: 0.3 MG/DL (ref 0.2–1.3)
BUN SERPL-MCNC: 15 MG/DL (ref 7–30)
CALCIUM SERPL-MCNC: 8.9 MG/DL (ref 8.5–10.1)
CHLORIDE SERPL-SCNC: 113 MMOL/L (ref 94–109)
CO2 SERPL-SCNC: 20 MMOL/L (ref 20–32)
CREAT SERPL-MCNC: 1.1 MG/DL (ref 0.66–1.25)
DIFFERENTIAL METHOD BLD: NORMAL
EOSINOPHIL # BLD AUTO: 0.2 10E9/L (ref 0–0.7)
EOSINOPHIL NFR BLD AUTO: 2.5 %
ERYTHROCYTE [DISTWIDTH] IN BLOOD BY AUTOMATED COUNT: 13.4 % (ref 10–15)
GFR SERPL CREATININE-BSD FRML MDRD: 78 ML/MIN/{1.73_M2}
GLUCOSE SERPL-MCNC: 88 MG/DL (ref 70–99)
HCT VFR BLD AUTO: 48 % (ref 40–53)
HGB BLD-MCNC: 15.4 G/DL (ref 13.3–17.7)
IMM GRANULOCYTES # BLD: 0 10E9/L (ref 0–0.4)
IMM GRANULOCYTES NFR BLD: 0.3 %
LYMPHOCYTES # BLD AUTO: 2.3 10E9/L (ref 0.8–5.3)
LYMPHOCYTES NFR BLD AUTO: 31.7 %
MCH RBC QN AUTO: 29.9 PG (ref 26.5–33)
MCHC RBC AUTO-ENTMCNC: 32.1 G/DL (ref 31.5–36.5)
MCV RBC AUTO: 93 FL (ref 78–100)
MONOCYTES # BLD AUTO: 0.5 10E9/L (ref 0–1.3)
MONOCYTES NFR BLD AUTO: 7.6 %
NEUTROPHILS # BLD AUTO: 4.1 10E9/L (ref 1.6–8.3)
NEUTROPHILS NFR BLD AUTO: 56.6 %
NRBC # BLD AUTO: 0 10*3/UL
NRBC BLD AUTO-RTO: 0 /100
PLATELET # BLD AUTO: 215 10E9/L (ref 150–450)
POTASSIUM SERPL-SCNC: 4.1 MMOL/L (ref 3.4–5.3)
PROT SERPL-MCNC: 7.2 G/DL (ref 6.8–8.8)
RBC # BLD AUTO: 5.15 10E12/L (ref 4.4–5.9)
SODIUM SERPL-SCNC: 140 MMOL/L (ref 133–144)
WBC # BLD AUTO: 7.1 10E9/L (ref 4–11)

## 2019-11-25 PROCEDURE — 96361 HYDRATE IV INFUSION ADD-ON: CPT | Performed by: STUDENT IN AN ORGANIZED HEALTH CARE EDUCATION/TRAINING PROGRAM

## 2019-11-25 PROCEDURE — 85025 COMPLETE CBC W/AUTO DIFF WBC: CPT | Performed by: STUDENT IN AN ORGANIZED HEALTH CARE EDUCATION/TRAINING PROGRAM

## 2019-11-25 PROCEDURE — A9585 GADOBUTROL INJECTION: HCPCS | Performed by: STUDENT IN AN ORGANIZED HEALTH CARE EDUCATION/TRAINING PROGRAM

## 2019-11-25 PROCEDURE — 25500064 ZZH RX 255 OP 636: Performed by: STUDENT IN AN ORGANIZED HEALTH CARE EDUCATION/TRAINING PROGRAM

## 2019-11-25 PROCEDURE — 99285 EMERGENCY DEPT VISIT HI MDM: CPT | Mod: Z6 | Performed by: STUDENT IN AN ORGANIZED HEALTH CARE EDUCATION/TRAINING PROGRAM

## 2019-11-25 PROCEDURE — 70544 MR ANGIOGRAPHY HEAD W/O DYE: CPT

## 2019-11-25 PROCEDURE — 25800030 ZZH RX IP 258 OP 636: Performed by: STUDENT IN AN ORGANIZED HEALTH CARE EDUCATION/TRAINING PROGRAM

## 2019-11-25 PROCEDURE — 96374 THER/PROPH/DIAG INJ IV PUSH: CPT | Mod: 59 | Performed by: STUDENT IN AN ORGANIZED HEALTH CARE EDUCATION/TRAINING PROGRAM

## 2019-11-25 PROCEDURE — 99285 EMERGENCY DEPT VISIT HI MDM: CPT | Mod: 25 | Performed by: STUDENT IN AN ORGANIZED HEALTH CARE EDUCATION/TRAINING PROGRAM

## 2019-11-25 PROCEDURE — 70549 MR ANGIOGRAPH NECK W/O&W/DYE: CPT

## 2019-11-25 PROCEDURE — 80053 COMPREHEN METABOLIC PANEL: CPT | Performed by: STUDENT IN AN ORGANIZED HEALTH CARE EDUCATION/TRAINING PROGRAM

## 2019-11-25 PROCEDURE — 93005 ELECTROCARDIOGRAM TRACING: CPT | Performed by: STUDENT IN AN ORGANIZED HEALTH CARE EDUCATION/TRAINING PROGRAM

## 2019-11-25 PROCEDURE — 25000128 H RX IP 250 OP 636: Performed by: STUDENT IN AN ORGANIZED HEALTH CARE EDUCATION/TRAINING PROGRAM

## 2019-11-25 PROCEDURE — 70553 MRI BRAIN STEM W/O & W/DYE: CPT

## 2019-11-25 RX ORDER — GADOBUTROL 604.72 MG/ML
10 INJECTION INTRAVENOUS ONCE
Status: COMPLETED | OUTPATIENT
Start: 2019-11-25 | End: 2019-11-25

## 2019-11-25 RX ORDER — SODIUM CHLORIDE 9 MG/ML
60 INJECTION, SOLUTION INTRAVENOUS ONCE
Status: COMPLETED | OUTPATIENT
Start: 2019-11-25 | End: 2019-11-25

## 2019-11-25 RX ORDER — LORAZEPAM 2 MG/ML
0.5 INJECTION INTRAMUSCULAR
Status: DISCONTINUED | OUTPATIENT
Start: 2019-11-25 | End: 2019-11-25 | Stop reason: HOSPADM

## 2019-11-25 RX ADMIN — SODIUM CHLORIDE 500 ML: 9 INJECTION, SOLUTION INTRAVENOUS at 13:29

## 2019-11-25 RX ADMIN — SODIUM CHLORIDE 50 ML: 9 INJECTION, SOLUTION INTRAVENOUS at 13:33

## 2019-11-25 RX ADMIN — LORAZEPAM 0.5 MG: 2 INJECTION, SOLUTION INTRAMUSCULAR; INTRAVENOUS at 13:29

## 2019-11-25 RX ADMIN — GADOBUTROL 10 ML: 604.72 INJECTION INTRAVENOUS at 13:33

## 2019-11-25 NOTE — ED PROVIDER NOTES
"  History     Chief Complaint   Patient presents with     Visual Field Defect Evaluation     both eye have a visual field cut for last 10 min, L worse than R. No HA, no hx of migraines     HPI  Alen Gibbs is a 48 year old male with past medical history which include hyperlipidemia and essential hypertension who presents for evaluation of transient episode of vision loss affecting the left visual fields of both eyes.  His symptoms began at 11:55 AM and resolving within 15-20 minutes.  He describes inability to see the left visual field with either eye, not completely black but described more as flashing lights or \"kaleidoscope\".  Patient states that he has near daily sinus headaches which are described as dull and achy along the front of his head and face.  He has had no unusual or severe headaches recently, no traumatic injuries, and no fever or infectious symptoms.  He is asymptomatic in the department without headache, weakness, sensory deficits, dizziness, imbalance, or ocular complaint.  Records indicate that the patient did have a previous episode of transient vision loss on 2/22/2019 but that particular episode was right-sided monocular loss affecting the lateral visual field.  During that emergency department visit he had extensive imaging including CT, CTA, and MRI without identifiable acute abnormality.  However he did not follow-up with ophthalmology as recommended.    Allergies:  Allergies   Allergen Reactions     Norco [Hydrocodone-Acetaminophen] Nausea     Simvastatin Other (See Comments)     fasciatus       Problem List:    Patient Active Problem List    Diagnosis Date Noted     Dehydration 07/03/2019     Priority: Medium     Tinea corporis 06/07/2019     Priority: Medium     Obesity (BMI 35.0-39.9) with comorbidity (H) 03/01/2019     Priority: Medium     Chondromalacia of patella, right 01/08/2018     Priority: Medium     Essential hypertension with goal blood pressure less than 130/80 05/20/2016 "     Priority: Medium     Tinea versicolor 01/15/2016     Priority: Medium     CARDIOVASCULAR SCREENING; LDL GOAL LESS THAN 130 07/21/2015     Priority: Medium     Mixed hyperlipidemia 01/25/2010     Priority: Medium        Past Medical History:    Past Medical History:   Diagnosis Date     Hyperlipidemia      Syncope and collapse        Past Surgical History:    Past Surgical History:   Procedure Laterality Date     ARTHROTOMY WRIST Right 6/10/2016    Procedure: ARTHROTOMY WRIST;  Surgeon: Toni Ewing MD;  Location: [facility] OR     BONE MARROW ASPIRATION ONLY         Family History:    Family History   Problem Relation Age of Onset     Breast Cancer Mother      Cardiovascular Father         CHF     Diabetes Father 66        Type II     Alzheimer Disease Maternal Grandmother      Respiratory Paternal Grandfather         emphysema     Blood Disease Brother 4        leukemia     Diabetes Daughter         type I       Social History:  Marital Status:   [2]  Social History     Tobacco Use     Smoking status: Never Smoker     Smokeless tobacco: Never Used   Substance Use Topics     Alcohol use: Yes     Comment: occ     Drug use: No        Medications:    Acetaminophen (TYLENOL PO)  allopurinol (ZYLOPRIM) 300 MG tablet  IBUPROFEN PO  lisinopril (PRINIVIL/ZESTRIL) 10 MG tablet          Review of Systems  Constitutional:  Negative for fever or recent illness.  HENT: Negative for nasal congestion or sinus pressure.  Eye: Positive for transient bout of homonymous hemianopia, resolved.  Negative for ocular pain or diplopia.  Respiratory:  Negative for cough or shortness of breath.  Gastrointestinal:  Negative for abdominal pain, nausea or vomiting.  Musculoskeletal: Negative for neck pain or stiffness.  Denies recent injury.  Neurological:  Negative for headache, dizziness, weakness or sensory deficits.    All others reviewed and are negative.      Physical Exam   BP: (!) 143/92  Pulse: 93  Heart Rate: 75  Temp: 98  F  (36.7  C)  Resp: 16  Weight: 122.5 kg (270 lb)  SpO2: 94 %      Physical Exam  Constitutional:  Well developed, well nourished.  Appears nontoxic and in no acute distress.   HENT:  Normocephalic and atraumatic.  Symmetric in appearance without facial droop.  Eye:  Visual acuity is 2/20 of each eye.  Eyelids appear symmetrical and without ptosis.  No proptosis or subconjunctival hemorrhage.  Visual fields intact.  EOMI and denies diplopia or pain with movement.  PERRLA without direct or consensual photophobia.  Negative for ocular erythema, conjunctivitis, ciliary flushing or discharge.  Cornea clear and without hyphema or hypopyon.  Red light reflex intact.  No significant papilledema via funduscopic exam.   Neck:  Neck supple.  Cardiovascular:  No cyanosis.  RRR.  No audible murmurs noted.    Respiratory:  Effort normal without sign of respiratory distress.  CTAB without diminished regions.    Musculoskeletal:  Moves extremities spontaneously.  Neurological:  Patient is alert and conversing appropriately without dysarthria or aphasia.  CN II/XII grossly intact.  Skin:  Skin is warm and dry.  Psychiatric:  Normal mood and affect.      ED Course        Procedures              Critical Care time:  none               Results for orders placed or performed during the hospital encounter of 11/25/19 (from the past 24 hour(s))   CBC with platelets differential   Result Value Ref Range    WBC 7.1 4.0 - 11.0 10e9/L    RBC Count 5.15 4.4 - 5.9 10e12/L    Hemoglobin 15.4 13.3 - 17.7 g/dL    Hematocrit 48.0 40.0 - 53.0 %    MCV 93 78 - 100 fl    MCH 29.9 26.5 - 33.0 pg    MCHC 32.1 31.5 - 36.5 g/dL    RDW 13.4 10.0 - 15.0 %    Platelet Count 215 150 - 450 10e9/L    Diff Method Automated Method     % Neutrophils 56.6 %    % Lymphocytes 31.7 %    % Monocytes 7.6 %    % Eosinophils 2.5 %    % Basophils 1.3 %    % Immature Granulocytes 0.3 %    Nucleated RBCs 0 0 /100    Absolute Neutrophil 4.1 1.6 - 8.3 10e9/L    Absolute  Lymphocytes 2.3 0.8 - 5.3 10e9/L    Absolute Monocytes 0.5 0.0 - 1.3 10e9/L    Absolute Eosinophils 0.2 0.0 - 0.7 10e9/L    Absolute Basophils 0.1 0.0 - 0.2 10e9/L    Abs Immature Granulocytes 0.0 0 - 0.4 10e9/L    Absolute Nucleated RBC 0.0    Comprehensive metabolic panel   Result Value Ref Range    Sodium 140 133 - 144 mmol/L    Potassium 4.1 3.4 - 5.3 mmol/L    Chloride 113 (H) 94 - 109 mmol/L    Carbon Dioxide 20 20 - 32 mmol/L    Anion Gap 7 3 - 14 mmol/L    Glucose 88 70 - 99 mg/dL    Urea Nitrogen 15 7 - 30 mg/dL    Creatinine 1.10 0.66 - 1.25 mg/dL    GFR Estimate 78 >60 mL/min/[1.73_m2]    GFR Estimate If Black >90 >60 mL/min/[1.73_m2]    Calcium 8.9 8.5 - 10.1 mg/dL    Bilirubin Total 0.3 0.2 - 1.3 mg/dL    Albumin 3.5 3.4 - 5.0 g/dL    Protein Total 7.2 6.8 - 8.8 g/dL    Alkaline Phosphatase 67 40 - 150 U/L    ALT 52 0 - 70 U/L    AST 26 0 - 45 U/L   MR Head w/o Contrast Angiogram    Narrative    MRA BRAIN ([place] [facility] [place]) WITHOUT CONTRAST 11/25/2019 1:49 PM    HISTORY: Transient homonymous hemianopia.    TECHNIQUE: 3D time-of-flight MR angiography was performed through the  [place] [facility] [place].    FINDINGS: The distal internal carotid arteries, basilar artery, and  proximal anterior, middle, and posterior cerebral arteries are patent.  There is no evidence for any large vessel occlusion or stenosis. There  is no evidence for a saccular aneurysm.      Impression    IMPRESSION: Negative MR angiography of the [place].    SOREN CHAVIRA MD   MR Brain w/o & w Contrast    Narrative    MRI BRAIN WITHOUT AND WITH CONTRAST  11/25/2019 2:18 PM    HISTORY:  Transient homonymous hemianopia.     TECHNIQUE:  Multiplanar, multisequence MRI of the brain without and  with 10 mL Gadavist.    COMPARISON: None.    FINDINGS: Diffusion-weighted images are normal. There is no evidence  for intracranial hemorrhage or acute infarct. The brain parenchyma,  brainstem, ventricular system, subarachnoid spaces, and  vascular  structures are appearance. Postcontrast images do not show any  abnormal areas of enhancement or any focal mass lesions. Vascular  structures are patent at the skull base.      Impression    IMPRESSION: Normal brain and brainstem MRI examination.    SOREN CHAVIRA MD   MR Neck w/o & w Contrast Angiogram    Narrative    MRA NECK (CAROTIDS) WITHOUT AND WITH CONTRAST  11/25/2019 2:19 PM    HISTORY: Transient homonymous hemianopia.    TECHNIQUE: MR angiography was performed through the neck without and  with contrast. Stenosis of carotid arteries was based on comparing  proximal ICA with distal ICA. 10 mL of Gadavist given.    FINDINGS: The brachiocephalic vessels are patent off the arch. The  carotid and vertebral systems were well visualized. The carotid  bifurcations are widely patent. There is no evidence for a stenosis or  dissection.        Impression    IMPRESSION: Negative MR angiography of the neck without and with  contrast.    SOREN CHAVIRA MD       Medications   LORazepam (ATIVAN) injection 0.5 mg (0.5 mg Intravenous Given 11/25/19 1329)   0.9% sodium chloride BOLUS (0 mLs Intravenous Stopped 11/25/19 1517)   sodium chloride 0.9% infusion (0 mLs Intravenous Stopped 11/25/19 1518)   gadobutrol (GADAVIST) injection 10 mL (10 mLs Intravenous Given 11/25/19 1333)       Assessments & Plan (with Medical Decision Making)   Alen Gibbs is a 48 year old male who presents to the department for evaluation after transient episode of left-sided vision loss affecting both eyes.  The episode lasted 15-20 minutes, diminished vision but described as flashing lights or scotoma as opposed to darkness or complete vision loss.  Patient does not have a history of migraine headaches has never been diagnosed with stroke.  In the department he has no active symptoms appreciable deficits on examination.  Consulted on-call ophthalmology Dr. Pedroza who states that ocular migraines could fit this description and often affect  the visual field of both eyes as the patient describes, however he recommends neurology consultation.  Subsequently consulted on-call stroke neurologist Dr. Arechiga who recommended MRI/MRA possible CVA but admits that symptoms could represent ocular migraine.  His imaging is read as having had no acute pathology or identifiable stroke.  In reviewing patient's symptoms he maintains that the visual field appeared more like flashing lights than complete loss of vision, description seems more consistent with ocular migraine than TIA.  We also discussed the option of observation admission for telemetry monitoring and echocardiogram as would be standard for TIA but patient requested to be discharged home and have the remainder of his work-up performed as an outpatient.  I have placed orders for complete echocardiogram with bubble study as well as neurology consultation but he plans to follow-up with primary provider over the next 5 days for reevaluation until neurology clinic appointment can be arranged.        Disclaimer:  This note consists of symbols derived from keyboarding, dictation, and/or voice recognition software.  As a result, there may be errors in the script that have gone undetected.  Please consider this when interpreting information found in the chart.        I have reviewed the nursing notes.    I have reviewed the findings, diagnosis, plan and need for follow up with the patient.       Discharge Medication List as of 11/25/2019  3:18 PM          Final diagnoses:   Episode of visual loss, bilateral       11/25/2019   [facility] EMERGENCY DEPARTMENT     Loki Barraza DO  11/25/19 6790

## 2019-11-25 NOTE — ED NOTES
Patient states about 10 minutes ago he felt a loss of peripheral vision on both sides. He sees a kaleidoscope arch peripherally on both sides. He states this has happened before and he was told it could be an ocular migraine, he does not normally have migraines. No other accompanying complaints.

## 2019-11-25 NOTE — ED AVS SNAPSHOT
[facility] Emergency Department  [address] [address]  Phone:  [phone]  Fax:  [phone]                                    Alen Gibbs   MRN: [id number]    Department:  [facility] Emergency Department   Date of Visit:  11/25/2019           After Visit Summary Signature Page    I have received my discharge instructions, and my questions have been answered. I have discussed any challenges I see with this plan with the nurse or doctor.    ..........................................................................................................................................  Patient/Patient Representative Signature      ..........................................................................................................................................  Patient Representative Print Name and Relationship to Patient    ..................................................               ................................................  Date                                   Time    ..........................................................................................................................................  Reviewed by Signature/Title    ...................................................              ..............................................  Date                                               Time          22EPIC Rev 08/18

## 2019-11-25 NOTE — ED NOTES
Patient states the kaleidoscope vision is completely gone, he estimates it lasted about 20 minutes

## 2019-12-02 ENCOUNTER — PRE VISIT (OUTPATIENT)
Dept: NEUROLOGY | Facility: CLINIC | Age: 49
End: 2019-12-02

## 2019-12-02 NOTE — TELEPHONE ENCOUNTER
FUTURE VISIT INFORMATION        FUTURE VISIT INFORMATION:    Date: 12/3/19    Time:  1245    Location: [facility]   REFERRAL INFORMATION:    Referring provider:  [facility] ED    Referring providers clinic:      Reason for visit/diagnosis:  Loss of Vision        NOTES (FOR ALL VISITS) STATUS DETAILS   OFFICE NOTE from referring provider     OFFICE NOTE from other specialist      DISCHARGE SUMMARY from hospital      DISCHARGE REPORT from the ER Printed 2/22/19, 11/25/19   MEDICATION LIST In Epic     IMAGING  (FOR ALL VISITS)       EMG      EEG      MRI (HEAD, NECK, SPINE)  Reports printed   Images in PACs   CT head 2/22/19  MRI brain 2/22/19, 11/25/19  MRA brain 11/25/19  MRA neck 11/25/19   CARDIAC STUDIES  Echo (pending)  11/25/19    FORMAL COGNITIVE TESTING      OTHER

## 2019-12-03 ENCOUNTER — OFFICE VISIT (OUTPATIENT)
Dept: NEUROLOGY | Facility: CLINIC | Age: 49
End: 2019-12-03
Payer: COMMERCIAL

## 2019-12-03 VITALS — SYSTOLIC BLOOD PRESSURE: 125 MMHG | DIASTOLIC BLOOD PRESSURE: 82 MMHG | HEART RATE: 89 BPM | RESPIRATION RATE: 16 BRPM

## 2019-12-03 DIAGNOSIS — R29.818 TRANSIENT NEUROLOGICAL SYMPTOMS: Primary | ICD-10-CM

## 2019-12-03 PROCEDURE — 99204 OFFICE O/P NEW MOD 45 MIN: CPT | Performed by: PSYCHIATRY & NEUROLOGY

## 2019-12-03 NOTE — PROGRESS NOTES
INITIAL NEUROLOGY CONSULTATION    DATE OF VISIT: 12/3/2019  MRN: [id number]  PATIENT NAME: Alen Gibbs  YOB: 1970    REFERRING PROVIDER: No ref. provider found    Chief Complaint   Patient presents with     Consult       SUBJECTIVE:                                                      HPI:   Alen Gibbs is a 48 year old male whom I have been asked by the Dr. Barraza of the [facility] ED team to see in consultation for vision loss. Per chart review, the patient had an episode of transient vision loss in the Right eye upon waking on 2.22.19. He was seen in the ED that day. He described peripheral vision loss in the eye then, with an  iridescent  quality to the vision in the periphery. He had had a mild headache the day prior but no headache the day of the visual symptoms. There was history of minor head injury while slipping on the ice. By the time he was seen in the ED, the vision had returned to normal. Head CT and CTA head/neck and brain MRI were all normal. His symptoms were felt to have been consistent with ocular migraine. The patient was seen again in the ED on 11.25.19, for vision change involving both eyes. He described kaleidoscope vision in the Left visual field of both eyes. He described daily sinus headaches as well. He admitted he had not followed up with ophthalmology in the interim between the two events, as previously recommended. Neurologic and eye exam was normal. MRI and COW MRA were normal. The second episode lasted 15-20 minutes. It was recommended that he do inpatient vascular work-up but the patient elected to go home. Brain MRI and MRA (including neck) were again normal. Both ophthalmology and neurology were consulted over the phone and felt the symptoms were most likely related to ocular migraine. He was referred for outpatient echocardiogram. ABCD2 score was 2, by my calculation, with the available information. Patient does not appear to have seen a primary care provider  "in the interim.     The patient has additional history of HTN and HLD. Last LDL on record was 166 in 2010. No HbA1c on record. He is not on a statin. His primary care provider is Dr. Pruitt.     The patient tells me the visual events were similar to each other. The second involved both eyes. He says it was mainly the upper Left field in both eyes, with flashing lights in that field of vision. He clarifies that the second event did not involve any headache. He says both events lasted less than half an hour. No other sensory changes, weakness, or speech changes. No personal history of migraines.     He says he was on a cholesterol medication in the past, and had some joint pain with this. He does not sure which one he was on. He agrees it has been a while since his cholesterol was checked and he doesn't eat \"the best.\" He does not have known diabetes. No known rhythm problems with the heart. He has not yet set up his echocardiogram.     He has been tested for TATIANA and says this was negative. He does not smoke, quit many years ago. Occasional alcohol use.     He has not yet seen an ophthalmologist since these visual events. He does have an eye doctor here at [facility]. He says the vision is back to normal since the event last week. No headaches.     He thinks that the event in July with the vomiting was related to eating some bad fish. He has not vomited blood or had any blood in his stool since. He does not have problems with easy bleeding.      No family history of bleeding disorder or early strokes. His grandmother did have some small strokes late in life.       Past Medical History:   Diagnosis Date     Hyperlipidemia      Syncope and collapse      Past Surgical History:   Procedure Laterality Date     ARTHROTOMY WRIST Right 6/10/2016    Procedure: ARTHROTOMY WRIST;  Surgeon: Toni Ewing MD;  Location: [facility] OR     BONE MARROW ASPIRATION ONLY         allopurinol (ZYLOPRIM) 300 MG tablet, Take 1 tablet " (300 mg) by mouth daily  IBUPROFEN PO, Take 800 mg by mouth every 8 hours as needed for moderate pain  lisinopril (PRINIVIL/ZESTRIL) 10 MG tablet, Take 1 tablet (10 mg) by mouth daily  Acetaminophen (TYLENOL PO), Take 1,300 mg by mouth every 8 hours as needed for mild pain or fever    No current facility-administered medications on file prior to visit.     Allergies   Allergen Reactions     Norco [Hydrocodone-Acetaminophen] Nausea     Simvastatin Other (See Comments)     fasciatus        Problem (# of Occurrences) Relation (Name,Age of Onset)    Alzheimer Disease (1) Maternal Grandmother    Blood Disease (1) Brother (4): leukemia    Breast Cancer (1) Mother    Cardiovascular (1) Father: CHF    Diabetes (2) Father (66): Type II, Daughter: type I    Respiratory (1) Paternal Grandfather: emphysema        Social History     Tobacco Use     Smoking status: Never Smoker     Smokeless tobacco: Never Used   Substance Use Topics     Alcohol use: Yes     Comment: occ     Drug use: No       REVIEW OF SYSTEMS:                                                      10-point review of systems is negative except as mentioned above in HPI.     EXAM:                                                      Physical Exam:   Vitals: /82 (BP Location: Right arm, Patient Position: Chair, Cuff Size: Adult Regular)   Pulse 89   Resp 16   BMI= There is no height or weight on file to calculate BMI.  GENERAL: NAD.   HEENT: NC/AT.  CV: RRR. S1, S2.   NECK: No bruits.  Neurologic:  MENTAL STATUS: Alert, attentive. Speech is fluent. Normal comprehension. Normal concentration. Adequate fund of knowledge.   CRANIAL NERVES: Discs flat. Visual fields intact to confrontation. Pupils equally, round and reactive to light. Facial sensation and movement normal. EOM full. Hearing intact to conversation. Trapezius strength intact. Palate moves symmetrically. Tongue midline.  MOTOR: 5/5 in proximal and distal muscle groups of upper and lower extremities.  Tone and bulk normal.   DTRs: Intact and symmetric. Babinski down-going bilaterally.   SENSATION: Normal light touch and pinprick. Intact proprioception. Vibration: Decreased at both ankles.   COORDINATION: Normal finger nose finger. Finger tapping normal.Knee heel shin normal.  STATION AND GAIT: Romberg negative. Casual gait and tandem normal.    Relevant Data:  MRI Brain (11/25/19):  FINDINGS: Diffusion-weighted images are normal. There is no evidence  for intracranial hemorrhage or acute infarct. The brain parenchyma,  brainstem, ventricular system, subarachnoid spaces, and vascular  structures are appearance. Postcontrast images do not show any  abnormal areas of enhancement or any focal mass lesions. Vascular  structures are patent at the skull base.                                                                      IMPRESSION: Normal brain and brainstem MRI examination.    MRA Head and Neck (11.25.19):  FINDINGS: The distal internal carotid arteries, basilar artery, and  proximal anterior, middle, and posterior cerebral arteries are patent.  There is no evidence for any large vessel occlusion or stenosis. There  is no evidence for a saccular aneurysm.  IMPRESSION: Negative MR angiography of the [place].    FINDINGS: The brachiocephalic vessels are patent off the arch. The  carotid and vertebral systems were well visualized. The carotid  bifurcations are widely patent. There is no evidence for a stenosis or  dissection.    IMPRESSION: Negative MR angiography of the neck without and with  Contrast.    Imaging reviewed by me. Agree with Radiology read.     ASSESSMENT and PLAN:                                                      Assessment and Plan:     ICD-10-CM    1. Transient neurological symptoms R29.818 Hemoglobin A1c     Lipid panel reflex to direct LDL Fasting        Mr. Gibbs is a pleasant 47 yo man with history of HTN and HLD (the latter is currently untreated), here for evaluation regarding  transient visual events.     Based on the description, I think the likelihood that the visual changes were related to vascular events is low. However, given that Alen does have some known vascular risk factors, we discussed completion of the TIA work-up with echocardiogram (already ordered) and lipids/HbA1c labs. He should follow-up with his primary care provider regarding vascular risk factors anyway. I also recommend he have an updated eye exam completed. To be on the safe side, he could start a daily aspirin as well.     We discussed the possibility of ocular migraine. If symptoms were to recur, and all else is negative, we could try a migraine abortive medication. This might be tricky to catch though, given that the symptoms are self-resolving in <30 minutes at this point. I will see Alen back as needed. He understands and agrees with the plan.     Patient Instructions:  Fasting blood work: HbA1c and Lipid panel.  Schedule the echocardiogram, as ordered by Dr. Barraza.   Have your eyes checked, as we discussed.   Make a follow-up appointment with Dr. Pruitt after the labs and echo are completed.   Consider a daily 81mg aspirin.   Return to Neurology if symptoms recur. If all of your tests for TIA are unremarkable, next step would be to try treating these visual events as migraine.       Total Time: 45 minutes were spent with the patient and in chart review. More than 50% of the time spent on counseling (as described above in Assessment and Plan/Instructions) /coordinating the care.    Zhane Herman MD  Neurology    CC: Nito Pruitt MD

## 2019-12-03 NOTE — LETTER
12/3/2019         RE: Alen Gibbs  [address] [facility] [address]        Dear Colleague,    Thank you for referring your patient, Alen Gibbs, to the [facility]. Please see a copy of my visit note below.    INITIAL NEUROLOGY CONSULTATION    DATE OF VISIT: 12/3/2019  MRN: [id number]  PATIENT NAME: Alen Gibbs  YOB: 1970    REFERRING PROVIDER: No ref. provider found    Chief Complaint   Patient presents with     Consult       SUBJECTIVE:                                                      HPI:   Alen Gibbs is a 48 year old male whom I have been asked by the Dr. Barraza of the [facility] ED team to see in consultation for vision loss. Per chart review, the patient had an episode of transient vision loss in the Right eye upon waking on 2.22.19. He was seen in the ED that day. He described peripheral vision loss in the eye then, with an  iridescent  quality to the vision in the periphery. He had had a mild headache the day prior but no headache the day of the visual symptoms. There was history of minor head injury while slipping on the ice. By the time he was seen in the ED, the vision had returned to normal. Head CT and CTA head/neck and brain MRI were all normal. His symptoms were felt to have been consistent with ocular migraine. The patient was seen again in the ED on 11.25.19, for vision change involving both eyes. He described kaleidoscope vision in the Left visual field of both eyes. He described daily sinus headaches as well. He admitted he had not followed up with ophthalmology in the interim between the two events, as previously recommended. Neurologic and eye exam was normal. MRI and COW MRA were normal. The second episode lasted 15-20 minutes. It was recommended that he do inpatient vascular work-up but the patient elected to go home. Brain MRI and MRA (including neck) were again normal. Both ophthalmology and neurology were consulted over the phone and  "felt the symptoms were most likely related to ocular migraine. He was referred for outpatient echocardiogram. ABCD2 score was 2, by my calculation, with the available information. Patient does not appear to have seen a primary care provider in the interim.     The patient has additional history of HTN and HLD. Last LDL on record was 166 in 2010. No HbA1c on record. He is not on a statin. His primary care provider is Dr. Pruitt.     The patient tells me the visual events were similar to each other. The second involved both eyes. He says it was mainly the upper Left field in both eyes, with flashing lights in that field of vision. He clarifies that the second event did not involve any headache. He says both events lasted less than half an hour. No other sensory changes, weakness, or speech changes. No personal history of migraines.     He says he was on a cholesterol medication in the past, and had some joint pain with this. He does not sure which one he was on. He agrees it has been a while since his cholesterol was checked and he doesn't eat \"the best.\" He does not have known diabetes. No known rhythm problems with the heart. He has not yet set up his echocardiogram.     He has been tested for TATIANA and says this was negative. He does not smoke, quit many years ago. Occasional alcohol use.     He has not yet seen an ophthalmologist since these visual events. He does have an eye doctor here at [facility]. He says the vision is back to normal since the event last week. No headaches.     He thinks that the event in July with the vomiting was related to eating some bad fish. He has not vomited blood or had any blood in his stool since. He does not have problems with easy bleeding.      No family history of bleeding disorder or early strokes. His grandmother did have some small strokes late in life.       Past Medical History:   Diagnosis Date     Hyperlipidemia      Syncope and collapse      Past Surgical History: "   Procedure Laterality Date     ARTHROTOMY WRIST Right 6/10/2016    Procedure: ARTHROTOMY WRIST;  Surgeon: Toni Ewing MD;  Location: [facility] OR     BONE MARROW ASPIRATION ONLY         allopurinol (ZYLOPRIM) 300 MG tablet, Take 1 tablet (300 mg) by mouth daily  IBUPROFEN PO, Take 800 mg by mouth every 8 hours as needed for moderate pain  lisinopril (PRINIVIL/ZESTRIL) 10 MG tablet, Take 1 tablet (10 mg) by mouth daily  Acetaminophen (TYLENOL PO), Take 1,300 mg by mouth every 8 hours as needed for mild pain or fever    No current facility-administered medications on file prior to visit.     Allergies   Allergen Reactions     Norco [Hydrocodone-Acetaminophen] Nausea     Simvastatin Other (See Comments)     fasciatus        Problem (# of Occurrences) Relation (Name,Age of Onset)    Alzheimer Disease (1) Maternal Grandmother    Blood Disease (1) Brother (4): leukemia    Breast Cancer (1) Mother    Cardiovascular (1) Father: CHF    Diabetes (2) Father (66): Type II, Daughter: type I    Respiratory (1) Paternal Grandfather: emphysema        Social History     Tobacco Use     Smoking status: Never Smoker     Smokeless tobacco: Never Used   Substance Use Topics     Alcohol use: Yes     Comment: occ     Drug use: No       REVIEW OF SYSTEMS:                                                      10-point review of systems is negative except as mentioned above in HPI.     EXAM:                                                      Physical Exam:   Vitals: /82 (BP Location: Right arm, Patient Position: Chair, Cuff Size: Adult Regular)   Pulse 89   Resp 16   BMI= There is no height or weight on file to calculate BMI.  GENERAL: NAD.   HEENT: NC/AT.  CV: RRR. S1, S2.   NECK: No bruits.  Neurologic:  MENTAL STATUS: Alert, attentive. Speech is fluent. Normal comprehension. Normal concentration. Adequate fund of knowledge.   CRANIAL NERVES: Discs flat. Visual fields intact to confrontation. Pupils equally, round and reactive  to light. Facial sensation and movement normal. EOM full. Hearing intact to conversation. Trapezius strength intact. Palate moves symmetrically. Tongue midline.  MOTOR: 5/5 in proximal and distal muscle groups of upper and lower extremities. Tone and bulk normal.   DTRs: Intact and symmetric. Babinski down-going bilaterally.   SENSATION: Normal light touch and pinprick. Intact proprioception. Vibration: Decreased at both ankles.   COORDINATION: Normal finger nose finger. Finger tapping normal.Knee heel shin normal.  STATION AND GAIT: Romberg negative. Casual gait and tandem normal.    Relevant Data:  MRI Brain (11/25/19):  FINDINGS: Diffusion-weighted images are normal. There is no evidence  for intracranial hemorrhage or acute infarct. The brain parenchyma,  brainstem, ventricular system, subarachnoid spaces, and vascular  structures are appearance. Postcontrast images do not show any  abnormal areas of enhancement or any focal mass lesions. Vascular  structures are patent at the skull base.                                                                      IMPRESSION: Normal brain and brainstem MRI examination.    MRA Head and Neck (11.25.19):  FINDINGS: The distal internal carotid arteries, basilar artery, and  proximal anterior, middle, and posterior cerebral arteries are patent.  There is no evidence for any large vessel occlusion or stenosis. There  is no evidence for a saccular aneurysm.  IMPRESSION: Negative MR angiography of the [place].    FINDINGS: The brachiocephalic vessels are patent off the arch. The  carotid and vertebral systems were well visualized. The carotid  bifurcations are widely patent. There is no evidence for a stenosis or  dissection.    IMPRESSION: Negative MR angiography of the neck without and with  Contrast.    Imaging reviewed by me. Agree with Radiology read.     ASSESSMENT and PLAN:                                                      Assessment and Plan:     ICD-10-CM     1. Transient neurological symptoms R29.818 Hemoglobin A1c     Lipid panel reflex to direct LDL Fasting        Mr. Gibbs is a pleasant 49 yo man with history of HTN and HLD (the latter is currently untreated), here for evaluation regarding transient visual events.     Based on the description, I think the likelihood that the visual changes were related to vascular events is low. However, given that Alen does have some known vascular risk factors, we discussed completion of the TIA work-up with echocardiogram (already ordered) and lipids/HbA1c labs. He should follow-up with his primary care provider regarding vascular risk factors anyway. I also recommend he have an updated eye exam completed. To be on the safe side, he could start a daily aspirin as well.     We discussed the possibility of ocular migraine. If symptoms were to recur, and all else is negative, we could try a migraine abortive medication. This might be tricky to catch though, given that the symptoms are self-resolving in <30 minutes at this point. I will see Alen back as needed. He understands and agrees with the plan.     Patient Instructions:  Fasting blood work: HbA1c and Lipid panel.  Schedule the echocardiogram, as ordered by Dr. Barraza.   Have your eyes checked, as we discussed.   Make a follow-up appointment with Dr. Pruitt after the labs and echo are completed.   Consider a daily 81mg aspirin.   Return to Neurology if symptoms recur. If all of your tests for TIA are unremarkable, next step would be to try treating these visual events as migraine.       Total Time: 45 minutes were spent with the patient and in chart review. More than 50% of the time spent on counseling (as described above in Assessment and Plan/Instructions) /coordinating the care.    Zhane Herman MD  Neurology    CC: Nito Pruitt MD      Again, thank you for allowing me to participate in the care of your patient.        Sincerely,        Zhane  Russel Herman MD

## 2019-12-03 NOTE — PATIENT INSTRUCTIONS
Plan:    Fasting blood work: HbA1c and Lipid panel.  Schedule the echocardiogram, as ordered by Dr. Barraza.   Have your eyes checked, as we discussed.   Make a follow-up appointment with Dr. Pruitt after the labs and echo are completed.   Consider a daily 81mg aspirin.   Return to Neurology if symptoms recur. If all of your tests for TIA are unremarkable, next step would be to try treating these visual events as migraine.

## 2019-12-03 NOTE — NURSING NOTE
"Initial /82 (BP Location: Right arm, Patient Position: Chair, Cuff Size: Adult Regular)   Pulse 89   Resp 16  Estimated body mass index is 37.66 kg/m  as calculated from the following:    Height as of 7/3/19: 1.803 m (5' 11\").    Weight as of 11/25/19: 122.5 kg (270 lb). .    Patient is  Here for a consult .  deshawn tucker LPN    "

## 2019-12-16 ENCOUNTER — HOSPITAL ENCOUNTER (EMERGENCY)
Facility: CLINIC | Age: 49
Discharge: HOME | End: 2019-12-16
Attending: PHYSICIAN ASSISTANT | Admitting: PHYSICIAN ASSISTANT
Payer: COMMERCIAL

## 2019-12-16 VITALS
OXYGEN SATURATION: 99 % | DIASTOLIC BLOOD PRESSURE: 97 MMHG | TEMPERATURE: 98.3 F | HEART RATE: 95 BPM | SYSTOLIC BLOOD PRESSURE: 135 MMHG | RESPIRATION RATE: 18 BRPM

## 2019-12-16 DIAGNOSIS — S49.92XA SHOULDER INJURY, LEFT, INITIAL ENCOUNTER: ICD-10-CM

## 2019-12-16 PROCEDURE — 99213 OFFICE O/P EST LOW 20 MIN: CPT | Mod: Z6 | Performed by: PHYSICIAN ASSISTANT

## 2019-12-16 PROCEDURE — G0463 HOSPITAL OUTPT CLINIC VISIT: HCPCS | Performed by: PHYSICIAN ASSISTANT

## 2019-12-16 NOTE — ED PROVIDER NOTES
History     Chief Complaint   Patient presents with     Shoulder Injury     left shouler injury after transfering patient from ambulance cot to ED stretcher.  Tabor instant pain left shoulder.  Unable to life left shoulder     HPI  Alen Gibbs is a 48 year old male who presents with complaints of left shoulder pain while at work today just prior to arrival.  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  His pain has persisted since that time and he has limited range of motion of the shoulder.  He is unable to fully abduct this arm without significant pain.  Patient denies history of shoulder problems in the past.  He complains of a fullness and discomfort throughout this left shoulder since the injury today.  He denies paresthesias.  Patient is right-hand dominant.      Allergies:  Allergies   Allergen Reactions     Norco [Hydrocodone-Acetaminophen] Nausea     Simvastatin Other (See Comments)     fasciatus       Problem List:    Patient Active Problem List    Diagnosis Date Noted     Dehydration 07/03/2019     Priority: Medium     Tinea corporis 06/07/2019     Priority: Medium     Obesity (BMI 35.0-39.9) with comorbidity (H) 03/01/2019     Priority: Medium     Chondromalacia of patella, right 01/08/2018     Priority: Medium     Essential hypertension with goal blood pressure less than 130/80 05/20/2016     Priority: Medium     Tinea versicolor 01/15/2016     Priority: Medium     CARDIOVASCULAR SCREENING; LDL GOAL LESS THAN 130 07/21/2015     Priority: Medium     Mixed hyperlipidemia 01/25/2010     Priority: Medium        Past Medical History:    Past Medical History:   Diagnosis Date     Hyperlipidemia      Syncope and collapse        Past Surgical History:    Past Surgical History:   Procedure Laterality Date     ARTHROTOMY WRIST Right 6/10/2016    Procedure: ARTHROTOMY WRIST;  Surgeon: Gildardo  Toni SAWYER MD;  Location: [facility] OR     BONE MARROW ASPIRATION ONLY         Family History:    Family History   Problem Relation Age of Onset     Breast Cancer Mother      Cardiovascular Father         CHF     Diabetes Father 66        Type II     Alzheimer Disease Maternal Grandmother      Respiratory Paternal Grandfather         emphysema     Blood Disease Brother 4        leukemia     Diabetes Daughter         type I       Social History:  Marital Status:   [2]  Social History     Tobacco Use     Smoking status: Never Smoker     Smokeless tobacco: Never Used   Substance Use Topics     Alcohol use: Yes     Comment: occ     Drug use: No        Medications:    Acetaminophen (TYLENOL PO)  allopurinol (ZYLOPRIM) 300 MG tablet  IBUPROFEN PO  lisinopril (PRINIVIL/ZESTRIL) 10 MG tablet          Review of Systems   Musculoskeletal:        Left shoulder pain   Skin: Negative.    Neurological: Negative.    All other systems reviewed and are negative.      Physical Exam   BP: (!) 135/97  Pulse: 95  Temp: 98.3  F (36.8  C)  Resp: 18  SpO2: 99 %      Physical Exam  Constitutional:       General: He is not in acute distress.     Appearance: He is well-developed. He is not ill-appearing, toxic-appearing or diaphoretic.   HENT:      Head: Normocephalic and atraumatic.   Eyes:      Conjunctiva/sclera: Conjunctivae normal.   Neck:      Musculoskeletal: Neck supple.   Cardiovascular:      Pulses: Normal pulses.   Pulmonary:      Effort: Pulmonary effort is normal.   Musculoskeletal:      Left shoulder: He exhibits decreased range of motion, tenderness, pain, spasm and decreased strength. He exhibits no effusion, no crepitus, no deformity and normal pulse.      Left elbow: Normal.      Left wrist: Normal.      Cervical back: Normal. He exhibits normal range of motion, no tenderness and no bony tenderness.      Left upper arm: Normal. He exhibits no tenderness, no bony tenderness, no swelling and no edema.      Left forearm: Normal.       Left hand: Normal. He exhibits normal range of motion, no tenderness, no bony tenderness and no swelling. Normal sensation noted. Normal strength noted.      Comments: Decreased active ROM of left shoulder especially with abduction.  Diffuse tenderness along left superior shoulder.  No overlying skin changes.   Skin:     General: Skin is warm and dry.      Capillary Refill: Capillary refill takes less than 2 seconds.   Neurological:      Mental Status: He is alert.      Sensory: Sensation is intact.      Motor: Motor function is intact.         ED Course        Procedures    No results found for this or any previous visit (from the past 24 hour(s)).    Medications - No data to display    Assessments & Plan (with Medical Decision Making)     Pt is a 48 year old male who presents with complaints of left shoulder pain while at work today just prior to arrival.  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  His pain has persisted since that time and he has limited range of motion of the shoulder.  He is unable to fully abduct this arm without significant pain.  Pt is afebrile on arrival.  Exam as above.  Concern for possible rotator cuff strain versus tear.  Encouraged symptomatic treatments at home.  F/u with orthopedics.  Return precautions were reviewed.  Hand-outs were provided.    Patient was instructed to follow-up with orthopedics in 3-5 days for continued care and management (referral was placed).  He is to return to the ED for persistent and/or worsening symptoms.  Patient expressed understanding of the diagnosis and plan and was discharged home in good condition.    I have reviewed the nursing notes.    I have reviewed the findings, diagnosis, plan and need for follow up with the patient.    Discharge Medication List as of 12/16/2019 12:48 PM          Final diagnoses:   Shoulder injury, left,  initial encounter       12/16/2019   [facility] EMERGENCY DEPARTMENT      Disclaimer:  This note consists of symbols derived from keyboarding, dictation and/or voice recognition software.  As a result, there may be errors in the script that have gone undetected.  Please consider this when interpreting information found in this chart.     Joie Cartwright PA-C  12/17/19 [id number]

## 2019-12-16 NOTE — ED AVS SNAPSHOT
[facility] Emergency Department  [address] [address]  Phone:  [phone]  Fax:  [phone]                                    Alen Gibbs   MRN: [id number]    Department:  [facility] Emergency Department   Date of Visit:  12/16/2019           After Visit Summary Signature Page    I have received my discharge instructions, and my questions have been answered. I have discussed any challenges I see with this plan with the nurse or doctor.    ..........................................................................................................................................  Patient/Patient Representative Signature      ..........................................................................................................................................  Patient Representative Print Name and Relationship to Patient    ..................................................               ................................................  Date                                   Time    ..........................................................................................................................................  Reviewed by Signature/Title    ...................................................              ..............................................  Date                                               Time          22EPIC Rev 08/18

## 2019-12-17 ASSESSMENT — ENCOUNTER SYMPTOMS: NEUROLOGICAL NEGATIVE: 1

## 2019-12-26 ENCOUNTER — TRANSFERRED RECORDS (OUTPATIENT)
Dept: HEALTH INFORMATION MANAGEMENT | Facility: CLINIC | Age: 49
End: 2019-12-26

## 2020-01-09 ENCOUNTER — HOSPITAL ENCOUNTER (OUTPATIENT)
Dept: PHYSICAL THERAPY | Facility: CLINIC | Age: 50
Setting detail: THERAPIES SERIES
End: 2020-01-09
Attending: PHYSICIAN ASSISTANT
Payer: OTHER MISCELLANEOUS

## 2020-01-09 PROCEDURE — 97161 PT EVAL LOW COMPLEX 20 MIN: CPT | Mod: GP | Performed by: PHYSICAL THERAPIST

## 2020-01-09 PROCEDURE — 97110 THERAPEUTIC EXERCISES: CPT | Mod: GP | Performed by: PHYSICAL THERAPIST

## 2020-01-09 NOTE — PROGRESS NOTES
Alen Gibbs  1970 Physical Therapy Initial Evaluation  01/09/20 0900   General Information   Type of Visit Initial OP Ortho PT Evaluation   Start of Care Date 01/09/20   Referring Physician Juan Manuel Lombardo MD   Patient/Family Goals Statement Sleep without shoulder pain   Orders Other   Orders Comment 1-2 times per week for 4-6 weeks   Date of Order 12/26/19   Certification Required? No   Medical Diagnosis Rotator cuff strain shoulder left / Impingement syndrome shoulder left   Surgical/Medical history reviewed Yes   Precautions/Limitations no known precautions/limitations   Body Part(s)   Body Part(s) Cervical Spine;Shoulder   Presentation and Etiology   Pertinent history of current problem (include personal factors and/or comorbidities that impact the POC) Was transferring the patient from gurney to a bed and felt a sharp pain on the lateral shoulder. No symptoms down into hand. Things have been improving. No previous shoulder issues. Washing back is difficult, sleeping is difficult as well. / Comorbidities - HTN   Impairments A. Pain;E. Decreased flexibility   Functional Limitations perform activities of daily living;perform required work activities;perform desired leisure / sports activities   Symptom Location Left lateral shoulder   How/Where did it occur At work   Onset date of current episode/exacerbation 12/16/19   Chronicity New   Pain rating (0-10 point scale) Best (/10);Worst (/10)   Best (/10) 1   Worst (/10) 7   Pain quality A. Sharp;C. Aching   Frequency of pain/symptoms D. Other   Pain frequency comment While lying down   Pain/symptoms exacerbated by G. Certain positions;L. Work tasks   Pain/symptoms eased by C. Rest   Progression of symptoms since onset: Improved   Current Level of Function   Patient role/employment history A. Employed   Employment Comments Emergency room technician   Fall Risk Screen   Fall screen completed by PT   Have you fallen 2 or more times in the past year? No   Have  you fallen and had an injury in the past year? No   Is patient a fall risk? No   Abuse Screen (yes response referral indicated)   Feels Unsafe at Home or Work/School no   Feels Threatened by Someone no   Does Anyone Try to Keep You From Having Contact with Others or Doing Things Outside Your Home? no   Physical Signs of Abuse Present no   Cervical Spine   Spurling Test Negative   Cervical Distraction Test Negative   Shoulder Objective Findings   Side (if bilateral, select both right and left) Left   Posture Mildly forward head and shoulder seated posture   Pec Minor (supine) Flexibility Moderate deficit   Neer's Test Negative   Keen-Jose Guadalupe Test Negative   Hamilton's Test Positive   Crossover Test Negative   Palpation No tenderness to palpation   Left Shoulder Flexion AROM 170 / Right - 165   Left Shoulder Abduction AROM 170 / Right - 158   Left Shoulder ER PROM 90 B   Left Shoulder IR AROM T7 / Right - T11   Left Shoulder Flexion Strength 5/5   Left Shoulder Abduction Strength 4/5   Left Shoulder ER Strength 4/5   Left Shoulder IR Strength 5/5   Planned Therapy Interventions   Planned Therapy Interventions manual therapy;joint mobilization;neuromuscular re-education;ROM;strengthening;stretching   Planned Modality Interventions   Planned Modality Interventions Cryotherapy;Hot packs;TENS;Electrical stimulation   Clinical Impression   Criteria for Skilled Therapeutic Interventions Met yes, treatment indicated   PT Diagnosis Left shoulder pain with flexibility and strength deficits   Influenced by the following impairments Pain, Strength and flexibility deficits   Functional limitations due to impairments Difficulty sleeping, lifting, bathing   Clinical Presentation Stable/Uncomplicated   Clinical Presentation Rationale 1 comorbidity impacting PT / 1 body system / Stable   Clinical Decision Making (Complexity) Low complexity   Therapy Frequency 1 time/week   Predicted Duration of Therapy Intervention (days/wks) 5  weeks   Risk & Benefits of therapy have been explained Yes   Patient, Family & other staff in agreement with plan of care Yes   Education Assessment   Preferred Learning Style Listening;Reading;Demonstration;Pictures/video   Barriers to Learning No barriers   ORTHO GOALS   PT Ortho Eval Goals 1;2;3;4   Ortho Goal 1   Goal Identifier HEP   Goal Description Pt will be independent in HEP in order to achieve an maintain long term treatment goals.   Target Date 02/09/20   Ortho Goal 2   Goal Identifier Lifting   Goal Description Pt will be able to lift chest compression equipment at work with a minimal increase in pain 1-2/10.   Target Date 02/20/20   Ortho Goal 3   Goal Identifier Sleeping   Goal Description Pt will be able to sleep through the night without waking up due to shoulder pain.   Target Date 02/20/20   Ortho Goal 4   Goal Identifier Bathing   Goal Description Pt will be able to wash his back with a minimal increase in pain 1-2/10.   Target Date 02/20/20   Total Evaluation Time   PT Tomas, Low Complexity Minutes (65289) 20     Lorenzo Wynn, PT, DPT

## 2020-01-15 ENCOUNTER — HOSPITAL ENCOUNTER (OUTPATIENT)
Dept: PHYSICAL THERAPY | Facility: CLINIC | Age: 50
Setting detail: THERAPIES SERIES
End: 2020-01-15
Attending: PHYSICIAN ASSISTANT
Payer: OTHER MISCELLANEOUS

## 2020-01-15 PROCEDURE — 97110 THERAPEUTIC EXERCISES: CPT | Mod: GP | Performed by: PHYSICAL THERAPIST

## 2020-01-22 ENCOUNTER — HOSPITAL ENCOUNTER (OUTPATIENT)
Dept: PHYSICAL THERAPY | Facility: CLINIC | Age: 50
Setting detail: THERAPIES SERIES
End: 2020-01-22
Attending: ORTHOPAEDIC SURGERY
Payer: OTHER MISCELLANEOUS

## 2020-01-22 PROCEDURE — 97110 THERAPEUTIC EXERCISES: CPT | Mod: GP | Performed by: PHYSICAL THERAPIST

## 2020-01-30 ENCOUNTER — TRANSFERRED RECORDS (OUTPATIENT)
Dept: HEALTH INFORMATION MANAGEMENT | Facility: CLINIC | Age: 50
End: 2020-01-30

## 2020-02-16 ENCOUNTER — HOSPITAL ENCOUNTER (EMERGENCY)
Facility: CLINIC | Age: 50
Discharge: HOME | End: 2020-02-16
Attending: PHYSICIAN ASSISTANT | Admitting: PHYSICIAN ASSISTANT
Payer: COMMERCIAL

## 2020-02-16 DIAGNOSIS — R07.0 THROAT PAIN: ICD-10-CM

## 2020-02-16 LAB
INTERNAL QC OK POCT: YES
S PYO AG THROAT QL IA.RAPID: NEGATIVE

## 2020-02-16 PROCEDURE — 99213 OFFICE O/P EST LOW 20 MIN: CPT | Mod: Z6 | Performed by: PHYSICIAN ASSISTANT

## 2020-02-16 PROCEDURE — G0463 HOSPITAL OUTPT CLINIC VISIT: HCPCS

## 2020-02-16 PROCEDURE — 87081 CULTURE SCREEN ONLY: CPT | Performed by: PHYSICIAN ASSISTANT

## 2020-02-16 PROCEDURE — 87880 STREP A ASSAY W/OPTIC: CPT | Performed by: PHYSICIAN ASSISTANT

## 2020-02-16 ASSESSMENT — ENCOUNTER SYMPTOMS: SORE THROAT: 1

## 2020-02-16 NOTE — ED TRIAGE NOTES
Patient requesting strep test. Declines all vitals. No fevers per patient. Steph Davis LPN on 2/16/2020 at 4:52 PM

## 2020-02-16 NOTE — ED PROVIDER NOTES
History   No chief complaint on file.    HPI  Alen Gibbs is a 49 year old male who presents to the urgent care with sore throat that started yesterday. Patient requesting rapid strep swab and throat culture, but declined further history and physical at this time since he has no other symptoms.     Allergies:  Allergies   Allergen Reactions     Norco [Hydrocodone-Acetaminophen] Nausea     Simvastatin Other (See Comments)     fasciatus       Problem List:    Patient Active Problem List    Diagnosis Date Noted     Dehydration 07/03/2019     Priority: Medium     Tinea corporis 06/07/2019     Priority: Medium     Obesity (BMI 35.0-39.9) with comorbidity (H) 03/01/2019     Priority: Medium     Chondromalacia of patella, right 01/08/2018     Priority: Medium     Essential hypertension with goal blood pressure less than 130/80 05/20/2016     Priority: Medium     Tinea versicolor 01/15/2016     Priority: Medium     CARDIOVASCULAR SCREENING; LDL GOAL LESS THAN 130 07/21/2015     Priority: Medium     Mixed hyperlipidemia 01/25/2010     Priority: Medium        Past Medical History:    Past Medical History:   Diagnosis Date     Hyperlipidemia      Syncope and collapse        Past Surgical History:    Past Surgical History:   Procedure Laterality Date     ARTHROTOMY WRIST Right 6/10/2016    Procedure: ARTHROTOMY WRIST;  Surgeon: Toni Ewing MD;  Location: [facility] OR     BONE MARROW ASPIRATION ONLY         Family History:    Family History   Problem Relation Age of Onset     Breast Cancer Mother      Cardiovascular Father         CHF     Diabetes Father 66        Type II     Alzheimer Disease Maternal Grandmother      Respiratory Paternal Grandfather         emphysema     Blood Disease Brother 4        leukemia     Diabetes Daughter         type I       Social History:  Marital Status:   [2]  Social History     Tobacco Use     Smoking status: Never Smoker     Smokeless tobacco: Never Used   Substance Use Topics      Alcohol use: Yes     Comment: occ     Drug use: No        Medications:    Acetaminophen (TYLENOL PO)  allopurinol (ZYLOPRIM) 300 MG tablet  IBUPROFEN PO  lisinopril (PRINIVIL/ZESTRIL) 10 MG tablet          Review of Systems   HENT: Positive for sore throat.    All other systems reviewed and are negative.      Physical Exam          Physical Exam     Patient declined vitals and physical.     ED Course        Procedures              Critical Care time:  none               Results for orders placed or performed during the hospital encounter of 02/16/20 (from the past 24 hour(s))   Rapid strep group A screen POCT   Result Value Ref Range    Rapid Strep A Screen negative neg    Internal QC OK Yes        Medications - No data to display    Assessments & Plan (with Medical Decision Making)     I have reviewed the nursing notes.    I have reviewed the findings, diagnosis, plan and need for follow up with the patient.   49-year-old male presents the urgent care with sore throat symptoms that started yesterday.  Patient requested rapid strep swab and throat culture, but declined further history and physical due to patient not having any other symptoms.  Physical exam obtained in office today and throat culture is currently sent and pending.  Rapid strep was negative.  Patient will return if symptoms worsen or change.    New Prescriptions    No medications on file       Final diagnoses:   Throat pain       2/16/2020   [facility] EMERGENCY DEPARTMENT     Millicent Silva PA-C  02/16/20 [id number]

## 2020-02-16 NOTE — DISCHARGE INSTRUCTIONS
No antibiotics indicated at this time.  Throat culture sent and currently pending.    Patient advised to call for any lab results (if obtained during visit) within 2-3 days.     Symptomatic treatment with fluids, rest, salt water gargles, and cool humidifier.  May use acetaminophen, ibuprofen prn.    Return to care if any worsening symptoms or if not improving ([place] may need to be ruled out if symptoms fail to improve).    Patient to go to Emergency Room if drooling, change in voice, difficulty swallowing or talking, or persistent fevers occur.      Patient voiced understanding of instructions given.

## 2020-02-18 LAB
BACTERIA SPEC CULT: ABNORMAL
Lab: ABNORMAL
SPECIMEN SOURCE: ABNORMAL

## 2020-02-18 NOTE — RESULT ENCOUNTER NOTE
Final Beta strep group A r/o culture is NEGATIVE for Group A streptococcus.    No treatment or change in treatment per [facility] Strep protocol.

## 2020-02-19 ENCOUNTER — PATIENT OUTREACH (OUTPATIENT)
Dept: CARE COORDINATION | Facility: CLINIC | Age: 50
End: 2020-02-19

## 2020-02-19 DIAGNOSIS — R07.0 THROAT PAIN: Primary | ICD-10-CM

## 2020-02-19 ASSESSMENT — ACTIVITIES OF DAILY LIVING (ADL): DEPENDENT_IADLS:: INDEPENDENT

## 2020-02-19 NOTE — PROGRESS NOTES
Clinical Product Navigator reviewed chart; patient on payer product coverage.  Review results: Patient met potential for CC Coordination, CC referral initiated by Clinic Product Navigator.      Clinic Care Coordination Contact  [facility]/Voicemail    Referral Source: ED Follow-Up  Clinical Data: Care Coordinator Outreach  Outreach attempted x 1.  Left message on patient's voicemail with call back information and requested return call.  Plan:  Care Coordinator will try to reach patient again in 1-2 business days.      Zhane Grant  Clinic Product Navigator  [email or web address]    Clinic Care Coordination Contact    Clinic Care Coordination Contact  OUTREACH    Referral Information:  Referral Source: ED Follow-Up         Chief Complaint   Patient presents with     Clinic Care Coordination - [facility]     Clincial Product Navigator Outreach        [place] Utilization: Patient frequently uses ED for non-emergent needs, Clinical Product Navigator outreached to patient to discuss the utilization of his PCP for non emergent needs such as his recent visit to the ED for throat pain. Clinical Product Navigator education patient to the services [facility] has for patient such as urgent care on the weekends and evenings if it is hard for him to get off work to go into the doctor. Patient verbalized understanding.     Patient confirmed that he sees  for his PCP.   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 Strep test negative. Patient and Clinical Product Navigator discussed utilizing his PCP clinic for care, patient stated he understands that it is more cost effective for him to go to his PCP, he just doesn't have time typically during the week.   Current Behavioral Concerns: None noted    Education Provided to patient: Clinical Product Navigator Role and role of the Care Coordination team at the [facility]; Patient verbailized understanding.  Pain  Pain (GOAL):: No  Health Maintenance Reviewed: Not assessed      Medication Management:  Self    Functional Status:  Dependent ADLs:: Independent  Dependent IADLs:: Independent  Bed or wheelchair confined:: No  Mobility Status: Independent  Fallen 2 or more times in the past year?: No  Any fall with injury in the past year?: No    Living Situation:  Current living arrangement:: I live in a private home with spouse  Type of residence:: Private home - \[facility] [place] [facility]\""    Lifestyle & Psychosocial Needs:        Diet:: Regular  Inadequate nutrition (GOAL):: No  Tube Feeding: No  Inadequate activity/exercise (GOAL):: No  Significant changes in sleep pattern (GOAL): No  Regular car     Baptist or spiritual beliefs that impact treatment:: No  Mental health DX:: No  Mental health management concern (GOAL):: No  Informal Support system:: Family   Socioeconomic History     Marital status:      Spouse name: Not on file     Number of children: Not on file     Years of education: Not on file     Highest education level: Not on file     Tobacco Use     Smoking status: Never Smoker     Smokeless tobacco: Never Used   Substance and Sexual Activity     Alcohol use: Yes     Comment: occ     Drug use: No     Sexual activity: Yes     Partners: Female               Resources and Interventions:  Current Resources:      Community Resources: None  Supplies used at home::  None  Equipment Currently Used at Home: none    Advance Care Plan/Directive  Advanced Care Plans/Directives on file:: No    Referrals Placed: None    Patient/Caregiver understanding: Patient verbalized understanding of utilizing the clinics for non-emergent needs as opposed to the ED.            Plan: No further outreaches will be made at this time unless a new referral is made or a change in the pt's status occurs. Patient was provided with Clinical Product Navigator contact information and encouraged to call with any questions or concerns.        Zhane Grant  Clinical Product Navigator  [email or web address] de [email or web address]

## 2020-03-19 ENCOUNTER — DOCUMENTATION ONLY (OUTPATIENT)
Dept: PHYSICAL THERAPY | Facility: CLINIC | Age: 50
End: 2020-03-19

## 2020-03-19 NOTE — PROGRESS NOTES
Alen Gibbs  1970  Diagnosis - Rotator cuff strain / Impingement syndrome shoulder left Physical Therapy Discharge Note  (Unless indicated, information is from last visit 01/22/20)   Signing Clinician's Name / Credentials   Signing clinician's name / credentials Lorenzo Wynn, PT, DPT   Session Number   Session Number 3 (Start of Care Date - 1/9/2020)   Progress Note/Recertification   Progress Note Due Date 02/20/20   Progress Note Completed Date 01/22/20   Adult Goals   PT Ortho Eval Goals 1;2;3;4   Ortho Goal 1   Goal Identifier HEP   Goal Description Pt will be independent in HEP in order to achieve an maintain long term treatment goals.   Target Date 02/09/20   Date Met 01/22/20   Ortho Goal 2   Goal Identifier Lifting   Goal Description Pt will be able to lift chest compression equipment at work with a minimal increase in pain 1-2/10.  (Not met. Still on light duty.)   Target Date 02/20/20   Ortho Goal 3   Goal Identifier Sleeping   Goal Description Pt will be able to sleep through the night without waking up due to shoulder pain.   Target Date 02/20/20   Date Met 01/22/20   Ortho Goal 4   Goal Identifier Bathing   Goal Description Pt will be able to wash his back with a minimal increase in pain 1-2/10.   Target Date 02/20/20   Date Met 01/22/20   Subjective Report   Subjective Report Shoulder has been feeling pretty good over the last week. No pain. Tired of being on light duty at work. 0/10 pain right now.   Objective Measures   Objective Measures Objective Measure 1;Objective Measure 2;Objective Measure 3   Objective Measure 1   Objective Measure Strength   Details Flexion - 5/5 / Abduction - 5/5 / ER - 5/5 / IR - 5/5   Objective Measure 2   Objective Measure AROM   Details Flexion - 170 / Abduction - 171 / IR - T6   Objective Measure 3   Objective Measure PROM   Details ER - 85   Treatment Interventions   Interventions Therapeutic Procedure/Exercise   Therapeutic Procedure/exercise   Therapeutic  Procedures: strength, endurance, ROM, flexibillity minutes (00230) 28   Skilled Intervention Stretching and strengthening exercise education   Patient Response No increase in discomfort noted   Treatment Detail UBE with 2.5 level resistance / Band on wall 2x10 with YTB / PNF D2 2x15 with YTB / Low rows 2x15 with gray TB / Resisted IR in standing 2x15 with gray TB / Scaption 2x15 with 5# /    Education   Learner Patient   Readiness Acceptance   Method Booklet/handout;Explanation;Demonstration   Response Verbalizes Understanding;Demonstrates Understanding   Plan   Home program Low rows / Resisted IR in standing / PNF D2 / Band on wall / Wall walks for flexion x10 with 10 second holds / Rows x15 with black TB / Scaption x15 with 3# and x15 with 5# / ER in SL    Updates to plan of care  (Updated 3/19/2020) Discharged   Comments   Comments  (Updated 3/19/2020) At last visit, pt was doing well in physical therapy and had met 3 of 4 goals. Pt had not met 4th and final goal as he was not back to regular duty at work yet. Pt's strength and ROM were excellent and he had very little pain. PT was put on hold in case of a flare-up in pain upon returning to regular duty. Pt has not returned in over 1 month and it is assumed that he has done well independently. Pt will be discharged to [facility] at this time.   Total Session Time   Timed Code Treatment Minutes 28   Total Treatment Time (sum of timed and untimed services) 28   AMBULATORY CLINICS ONLY-MEDICAL AND TREATMENT DIAGNOSIS   PT Diagnosis Left shoulder pain with flexibility and strength deficits     Referring Physician - Juan Manuel Lombardo MD

## 2020-03-31 DIAGNOSIS — M10.9 ACUTE GOUTY ARTHRITIS: ICD-10-CM

## 2020-03-31 DIAGNOSIS — I10 ESSENTIAL HYPERTENSION WITH GOAL BLOOD PRESSURE LESS THAN 130/80: ICD-10-CM

## 2020-03-31 DIAGNOSIS — B36.0 TINEA VERSICOLOR: ICD-10-CM

## 2020-03-31 RX ORDER — FLUCONAZOLE 150 MG/1
150 TABLET ORAL ONCE
Qty: 1 TABLET | Refills: 5 | Status: SHIPPED | OUTPATIENT
Start: 2020-03-31 | End: 2021-07-12

## 2020-03-31 RX ORDER — LISINOPRIL 10 MG/1
10 TABLET ORAL DAILY
Qty: 90 TABLET | Refills: 3 | Status: SHIPPED | OUTPATIENT
Start: 2020-03-31 | End: 2021-03-26

## 2020-03-31 RX ORDER — ALLOPURINOL 300 MG/1
1 TABLET ORAL DAILY
Qty: 90 TABLET | Refills: 3 | Status: SHIPPED | OUTPATIENT
Start: 2020-03-31 | End: 2021-03-26

## 2020-03-31 NOTE — TELEPHONE ENCOUNTER
Reason for Call:  Medication or medication refill:    Do you use a [facility]?  Name of the pharmacy and phone number for the current request:  [facility] [phone]    Name of the medication requested:   Fluconazole- Rash came back and asking for a refill of the medication that he has had in the past.    Allopurinol    Lisinopril      Can we leave a detailed message on this number? YES    Phone number patient can be reached at: Home number on file [phone] (home)    Best Time: any    Call taken on 3/31/2020 at 10:43 AM by Denise Peñaloza

## 2020-03-31 NOTE — TELEPHONE ENCOUNTER
"Requested Prescriptions   Pending Prescriptions Disp Refills     allopurinol (ZYLOPRIM) 300 MG tablet 90 tablet 3     Sig: Take 1 tablet (300 mg) by mouth daily  Last Written Prescription Date:  3/1/2019  Last Fill Quantity: 90,  # refills: 3   Last office visit: 7/3/2019 with prescribing provider:  Radha Guerra Office Visit:           Gout Agents Protocol Failed - 3/31/2020 10:51 AM        Failed - Has Uric Acid on file in past 12 months and value is less than 6     Recent Labs   Lab Test 05/10/17  1239   URIC 8.0*     If level is 6mg/dL or greater, ok to refill one time and refer to provider.           Passed - CBC on file in past 12 months     Recent Labs   Lab Test 11/25/19  1330   WBC 7.1   RBC 5.15   HGB 15.4   HCT 48.0                    Passed - ALT on file in past 12 months     Recent Labs   Lab Test 11/25/19  1330   ALT 52             Passed - Recent (12 mo) or future (30 days) visit within the authorizing provider's specialty     Patient has had an office visit with the authorizing provider or a provider within the authorizing providers department within the previous 12 mos or has a future within next 30 days. See \"Patient Info\" tab in inbasket, or \"Choose Columns\" in Meds & Orders section of the refill encounter.              Passed - Medication is active on med list        Passed - Patient is age 18 or older        Passed - Normal serum creatinine on file in the past 12 months     Recent Labs   Lab Test 11/25/19  1330   CR 1.10       Ok to refill medication if creatinine is low             lisinopril (ZESTRIL) 10 MG tablet 90 tablet 3     Sig: Take 1 tablet (10 mg) by mouth daily  Last Written Prescription Date:  3/1/2019  Last Fill Quantity: 90,  # refills: 3   Last office visit: 7/3/2019 with prescribing provider:  Radha Guerra Office Visit:           ACE Inhibitors (Including Combos) Protocol Failed - 3/31/2020 10:51 AM        Failed - Blood pressure under 140/90 in past 12 months    " " BP Readings from Last 3 Encounters:   12/16/19 (!) 135/97   12/03/19 125/82   11/25/19 (!) 144/91                 Passed - Recent (12 mo) or future (30 days) visit within the authorizing provider's specialty     Patient has had an office visit with the authorizing provider or a provider within the authorizing providers department within the previous 12 mos or has a future within next 30 days. See \"Patient Info\" tab in inbasket, or \"Choose Columns\" in Meds & Orders section of the refill encounter.              Passed - Medication is active on med list        Passed - Patient is age 18 or older        Passed - Normal serum creatinine on file in past 12 months     Recent Labs   Lab Test 11/25/19  1330   CR 1.10       Ok to refill medication if creatinine is low          Passed - Normal serum potassium on file in past 12 months     Recent Labs   Lab Test 11/25/19  1330   POTASSIUM 4.1                fluconazole (DIFLUCAN) 150 MG tablet 1 tablet 5     Sig: Take 1 tablet (150 mg) by mouth once for 1 dose        Last Written Prescription Date:  6/7/2019  Last Fill Quantity: 5,  # refills: 3   Last office visit: 7/3/2019 with prescribing provider:  Radha   Future Office Visit:        Routing refill request to provider for review/approval because:  Drug not active on patient's medication list         Antifungal Agents Failed - 3/31/2020 10:51 AM        Failed - Not Fluconazole or Terconazole      If oral Fluconazole or Terconazole, may refill if indicated in progress notes.           Failed - Medication is active on med list        Passed - Recent (12 mo) or future (30 days) visit within the authorizing provider's specialty     Patient has had an office visit with the authorizing provider or a provider within the authorizing providers department within the previous 12 mos or has a future within next 30 days. See \"Patient Info\" tab in inbasket, or \"Choose Columns\" in Meds & Orders section of the refill encounter.          "

## 2020-05-27 ENCOUNTER — TELEPHONE (OUTPATIENT)
Dept: FAMILY MEDICINE | Facility: CLINIC | Age: 50
End: 2020-05-27

## 2020-05-27 NOTE — TELEPHONE ENCOUNTER
It has been just about a yr since last seen in office.  Advised a VRT and connected to appts. Kiya TRUONG RN

## 2020-05-27 NOTE — TELEPHONE ENCOUNTER
Reason for call:  Patient reporting a symptom    Symptom or request: Pt calling to ask Dr. Pruitt for an Rx for Diflucan for a fungal rash that he gets yearly.  [facility]  Please call patient and advise.      Duration (how long have symptoms been present):     Have you been treated for this before? Yes    Additional comments:     Phone Number patient can be reached at:  Home number on file [phone] (home)    Best Time:  any    Can we leave a detailed message on this number:  YES    Call taken on 5/27/2020 at 10:26 AM by Denia Walter

## 2020-05-28 ENCOUNTER — VIRTUAL VISIT (OUTPATIENT)
Dept: FAMILY MEDICINE | Facility: CLINIC | Age: 50
End: 2020-05-28
Payer: COMMERCIAL

## 2020-05-28 DIAGNOSIS — E66.01 MORBID OBESITY (H): ICD-10-CM

## 2020-05-28 DIAGNOSIS — B36.0 TINEA VERSICOLOR: Primary | ICD-10-CM

## 2020-05-28 DIAGNOSIS — Z12.11 SPECIAL SCREENING FOR MALIGNANT NEOPLASMS, COLON: ICD-10-CM

## 2020-05-28 DIAGNOSIS — Z23 NEED FOR TD VACCINE: ICD-10-CM

## 2020-05-28 PROCEDURE — 99213 OFFICE O/P EST LOW 20 MIN: CPT | Mod: TEL | Performed by: FAMILY MEDICINE

## 2020-05-28 RX ORDER — FLUCONAZOLE 150 MG/1
150 TABLET ORAL DAILY
Qty: 3 TABLET | Refills: 3 | Status: SHIPPED | OUTPATIENT
Start: 2020-05-28 | End: 2021-04-13

## 2020-05-28 NOTE — PROGRESS NOTES
"  SUBJECTIVE:                                                    Alen Gibbs is a 49 year old male who is being evaluated via a billable telephone visit.    No chief complaint on file.        Derm Issue    Was diagnosed with Tinea versicolor in the past.    Symptoms for few years intermittently.     States that rash comes once a year during the summer months.     Rash on abdomin, back and posterior knee. Rash is red and blotchy.     No itch, no  flaking, not raised.    Has been prescribed Diflucan in the past. States this is usually helpful.    No other at home treatments to date.    No new medications, lotions, soaps, detergents, or travel.                The patient has been notified of following:     \"This telephone visit will be conducted via a call between you and your physician/provider. We have found that certain health care needs can be provided without the need for a physical exam.  This service lets us provide the care you need with a short phone conversation.  If a prescription is necessary we can send it directly to your pharmacy.  If lab work is needed we can place an order for that and you can then stop by our lab to have the test done at a later time.    Telephone visits are billed at different rates depending on your insurance coverage. During this emergency period, for some insurers they may be billed the same as an in-person visit.  Please reach out to your insurance provider with any questions.    If during the course of the call the physician/provider feels a telephone visit is not appropriate, you will not be charged for this service.\"    Patient has given verbal consent for Telephone visit?  Yes    How would you like to obtain your AVS? MyChart        Problem list and histories reviewed & adjusted, as indicated.  Additional history: works in hot environment so sweaty a lot, more in summer.  No discomfort just unattractive.      Patient Active Problem List   Diagnosis     CARDIOVASCULAR " SCREENING; LDL GOAL LESS THAN 130     Tinea versicolor     Essential hypertension with goal blood pressure less than 130/80     Chondromalacia of patella, right     Obesity (BMI 35.0-39.9) with comorbidity (H)     Tinea corporis     Dehydration     Mixed hyperlipidemia     Past Surgical History:   Procedure Laterality Date     ARTHROTOMY WRIST Right 6/10/2016    Procedure: ARTHROTOMY WRIST;  Surgeon: Toni Ewing MD;  Location: [facility] OR     BONE MARROW ASPIRATION ONLY         Social History     Tobacco Use     Smoking status: Never Smoker     Smokeless tobacco: Never Used   Substance Use Topics     Alcohol use: Yes     Comment: occ     Family History   Problem Relation Age of Onset     Breast Cancer Mother      Cardiovascular Father         CHF     Diabetes Father 66        Type II     Alzheimer Disease Maternal Grandmother      Respiratory Paternal Grandfather         emphysema     Blood Disease Brother 4        leukemia     Diabetes Daughter         type I         Current Outpatient Medications   Medication Sig Dispense Refill     Acetaminophen (TYLENOL PO) Take 1,300 mg by mouth every 8 hours as needed for mild pain or fever       allopurinol (ZYLOPRIM) 300 MG tablet Take 1 tablet (300 mg) by mouth daily 90 tablet 3     fluconazole (DIFLUCAN) 150 MG tablet Take 1 tablet (150 mg) by mouth daily Take once a week for 3 weeks 3 tablet 3     IBUPROFEN PO Take 800 mg by mouth every 8 hours as needed for moderate pain       lisinopril (ZESTRIL) 10 MG tablet Take 1 tablet (10 mg) by mouth daily 90 tablet 3     Allergies   Allergen Reactions     Norco [Hydrocodone-Acetaminophen] Nausea     Simvastatin Other (See Comments)     fasciatus     Recent Labs   Lab Test 11/25/19  1330 07/03/19  0946 06/07/19  1126 04/29/19  1739  07/16/15  0902   ALT 52  --  42 50  --  33   CR 1.10 1.16 1.21 0.96   < > 1.04   GFRESTIMATED 78 73 70 >90   < > 77   GFRESTBLACK >90 85 81 >90   < > >90  African American GFR Calc     POTASSIUM 4.1  4.1 4.0 4.2   < > 4.1   TSH  --   --  0.98  --   --  1.51    < > = values in this interval not displayed.      BP Readings from Last 3 Encounters:   12/16/19 (!) 135/97   12/03/19 125/82   11/25/19 (!) 144/91    Wt Readings from Last 3 Encounters:   11/25/19 122.5 kg (270 lb)   07/03/19 122.5 kg (270 lb)   06/07/19 123.8 kg (273 lb)         ROS:  Constitutional, HEENT, cardiovascular, pulmonary, gi and gu systems are negative, except as otherwise noted.    OBJECTIVE:                                                    There were no vitals taken for this visit.  GENERAL APPEARANCE ADULT: Alert, no acute distress, upbeat and well sounding on phone  Diagnostic Test Results:  none      Phone call duration:  11 minutes      ASSESSMENT/PLAN:                                                    1. Tinea versicolor  If no better see dermatology for recommendations, with warm environment at work and persistent symptoms consider evaluation by dermatology  - fluconazole (DIFLUCAN) 150 MG tablet; Take 1 tablet (150 mg) by mouth daily Take once a week for 3 weeks  Dispense: 3 tablet; Refill: 3  - DERMATOLOGY REFERRAL    2. Special screening for malignant neoplasms, colon  - Fecal colorectal cancer screen (FIT); Future    3. Need for Td vaccine  Will make appointment with nurse for updated of Td.    4. Morbid obesity (H)  Ideal weight for height is 180, realistic weight 200 (over 70 bs).  Need to lose at least 10%, 27 pounds in 27 weeks.      Jennifer Davis MD  [facility]

## 2020-06-09 DIAGNOSIS — Z12.11 SPECIAL SCREENING FOR MALIGNANT NEOPLASMS, COLON: ICD-10-CM

## 2020-06-09 PROCEDURE — 82274 ASSAY TEST FOR BLOOD FECAL: CPT | Performed by: FAMILY MEDICINE

## 2020-06-14 LAB — HEMOCCULT STL QL IA: NEGATIVE

## 2020-07-16 ENCOUNTER — HOSPITAL ENCOUNTER (OUTPATIENT)
Dept: CARDIOLOGY | Facility: CLINIC | Age: 50
Discharge: HOME | End: 2020-07-16
Attending: STUDENT IN AN ORGANIZED HEALTH CARE EDUCATION/TRAINING PROGRAM | Admitting: STUDENT IN AN ORGANIZED HEALTH CARE EDUCATION/TRAINING PROGRAM
Payer: COMMERCIAL

## 2020-07-16 DIAGNOSIS — H53.123: ICD-10-CM

## 2020-07-16 PROCEDURE — 93306 TTE W/DOPPLER COMPLETE: CPT | Mod: 26 | Performed by: INTERNAL MEDICINE

## 2020-07-16 PROCEDURE — 25500064 ZZH RX 255 OP 636: Performed by: STUDENT IN AN ORGANIZED HEALTH CARE EDUCATION/TRAINING PROGRAM

## 2020-07-16 RX ADMIN — HUMAN ALBUMIN MICROSPHERES AND PERFLUTREN 2 ML: 10; .22 INJECTION, SOLUTION INTRAVENOUS at 11:13

## 2020-07-27 ENCOUNTER — OFFICE VISIT (OUTPATIENT)
Dept: DERMATOLOGY | Facility: CLINIC | Age: 50
End: 2020-07-27
Payer: COMMERCIAL

## 2020-07-27 VITALS
BODY MASS INDEX: 37.66 KG/M2 | SYSTOLIC BLOOD PRESSURE: 122 MMHG | HEIGHT: 71 IN | HEART RATE: 72 BPM | DIASTOLIC BLOOD PRESSURE: 89 MMHG

## 2020-07-27 DIAGNOSIS — D23.9 DERMAL NEVUS: ICD-10-CM

## 2020-07-27 DIAGNOSIS — D18.01 ANGIOMA OF SKIN: ICD-10-CM

## 2020-07-27 DIAGNOSIS — L81.4 LENTIGO: ICD-10-CM

## 2020-07-27 DIAGNOSIS — B36.0 TINEA VERSICOLOR DUE TO MALASSEZIA FURFUR: ICD-10-CM

## 2020-07-27 DIAGNOSIS — L82.1 SEBORRHEIC KERATOSIS: Primary | ICD-10-CM

## 2020-07-27 PROCEDURE — 99203 OFFICE O/P NEW LOW 30 MIN: CPT | Performed by: DERMATOLOGY

## 2020-07-27 RX ORDER — FLUCONAZOLE 100 MG/1
150 TABLET ORAL ONCE
Qty: 4 TABLET | Refills: 3 | Status: SHIPPED | OUTPATIENT
Start: 2020-07-27 | End: 2020-07-27

## 2020-07-27 NOTE — PROGRESS NOTES
Alen Gibbs is a 49 year old year old male patient here today for spot on forehead.   .  Patient states this has been present for a while.  Patient reports the following symptoms:  brown.  Patient reports the following previous treatments none.  These treatments did not work.  Patient reports the following modifying factors none.  Associated symptoms: none. He notes hx of tinea versicolor uses diflucan prn .  .  Patient has no other skin complaints today.  Remainder of the HPI, Meds, PMH, Allergies, FH, and SH was reviewed in chart.      Past Medical History:   Diagnosis Date     Hyperlipidemia      Syncope and collapse        Past Surgical History:   Procedure Laterality Date     ARTHROTOMY WRIST Right 6/10/2016    Procedure: ARTHROTOMY WRIST;  Surgeon: Toni Ewing MD;  Location: [facility]     BONE MARROW ASPIRATION ONLY          Family History   Problem Relation Age of Onset     Breast Cancer Mother      Cardiovascular Father         CHF     Diabetes Father 66        Type II     Alzheimer Disease Maternal Grandmother      Respiratory Paternal Grandfather         emphysema     Blood Disease Brother 4        leukemia     Diabetes Daughter         type I       Social History     Socioeconomic History     Marital status:      Spouse name: Not on file     Number of children: Not on file     Years of education: Not on file     Highest education level: Not on file   Occupational History     Not on file   Social Needs     Financial resource strain: Not on file     Food insecurity     Worry: Not on file     Inability: Not on file     Transportation needs     Medical: Not on file     Non-medical: Not on file   Tobacco Use     Smoking status: Never Smoker     Smokeless tobacco: Never Used   Substance and Sexual Activity     Alcohol use: Yes     Comment: occ     Drug use: No     Sexual activity: Yes     Partners: Female   Lifestyle     Physical activity     Days per week: Not on file     Minutes per session: Not  on file     Stress: Not on file   Relationships     Social connections     Talks on phone: Not on file     Gets together: Not on file     Attends Baptism service: Not on file     Active member of club or organization: Not on file     Attends meetings of clubs or organizations: Not on file     Relationship status: Not on file     Intimate partner violence     Fear of current or ex partner: Not on file     Emotionally abused: Not on file     Physically abused: Not on file     Forced sexual activity: Not on file   Other Topics Concern     Parent/sibling w/ CABG, MI or angioplasty before 65F 55M? No   Social History Narrative     Not on file       Outpatient Encounter Medications as of 2020   Medication Sig Dispense Refill     Acetaminophen (TYLENOL PO) Take 1,300 mg by mouth every 8 hours as needed for mild pain or fever       allopurinol (ZYLOPRIM) 300 MG tablet Take 1 tablet (300 mg) by mouth daily 90 tablet 3     fluconazole (DIFLUCAN) 150 MG tablet Take 1 tablet (150 mg) by mouth daily Take once a week for 3 weeks 3 tablet 3     [] fluconazole (DIFLUCAN) 150 MG tablet Take 1 tablet (150 mg) by mouth once for 1 dose 1 tablet 5     IBUPROFEN PO Take 800 mg by mouth every 8 hours as needed for moderate pain       lisinopril (ZESTRIL) 10 MG tablet Take 1 tablet (10 mg) by mouth daily 90 tablet 3     No facility-administered encounter medications on file as of 2020.              Review Of Systems  Skin: As above  Eyes: negative  Ears/Nose/Throat: negative  Respiratory: No shortness of breath, dyspnea on exertion, cough, or hemoptysis  Cardiovascular: negative  Gastrointestinal: negative  Genitourinary: negative  Musculoskeletal: negative  Neurologic: negative  Psychiatric: negative  Hematologic/Lymphatic/Immunologic: negative  Endocrine: negative      O:   NAD, WDWN, Alert & Oriented, Mood & Affect wnl, Vitals stable   Here today alone   There were no vitals taken for this visit.   General appearance  normal   Vitals stable   Alert, oriented and in no acute distress      Following lymph nodes palpated: Occipital, Cervical, Supraclavicular no lad   Forehead stuck on papule      Stuck on papules and brown macules on trunk and ext   Red papules on trunk  Flesh colored papules on trunk     The remainder of the full exam was normal; the following areas were examined:  conjunctiva/lids, oral mucosa, neck, peripheral vascular system, abdomen, lymph nodes, digits/nails, eccrine and apocrine glands, scalp/hair, face, neck, chest, abdomen, buttocks, back, RUE, LUE, RLE, LLE       Eyes: Conjunctivae/lids:Normal     ENT: Lips, buccal mucosa, tongue: normal    MSK:Normal    Cardiovascular: peripheral edema none    Pulm: Breathing Normal    Lymph Nodes: No Head and Neck Lymphadenopathy     Neuro/Psych: Orientation:Alert and Orientedx3 ; Mood/Affect:normal       A/P:  1. Seborrheic keratosis, lentigo, angioma, dermal nevus  2. Tinea versicolor  diflfucan prn     BENIGN LESIONS DISCUSSED WITH PATIENT:  I discussed the specifics of tumor, prognosis, and genetics of benign lesions.  I explained that treatment of these lesions would be purely cosmetic and not medically neccessary.  I discussed with patient different removal options including excision, cautery and /or laser.      Nature and genetics of benign skin lesions dicussed with patient.  Signs and Symptoms of skin cancer discussed with patient.  Patient encouraged to perform monthly skin exams.  UV precautions reviewed with patient.  Skin care regimen reviewed with patient: Eliminate harsh soaps, i.e. Dial, zest, irsih spring; Mild soaps such as Cetaphil or Dove sensitive skin, avoid hot or cold showers, aggressive use of emollients including vanicream, cetaphil or cerave discussed with patient.    Risks of non-melanoma skin cancer discussed with patient   Return to clinic 12 months

## 2020-07-27 NOTE — LETTER
7/27/2020         RE: Alen Gibbs  [address]   [place] [address]        Dear Colleague,    Thank you for referring your patient, Alen Gibbs, to the [facility]. Please see a copy of my visit note below.    Alen Gibbs is a 49 year old year old male patient here today for spot on forehead.   .  Patient states this has been present for a while.  Patient reports the following symptoms:  brown.  Patient reports the following previous treatments none.  These treatments did not work.  Patient reports the following modifying factors none.  Associated symptoms: none. He notes hx of tinea versicolor uses diflucan prn .  .  Patient has no other skin complaints today.  Remainder of the HPI, Meds, PMH, Allergies, FH, and SH was reviewed in chart.      Past Medical History:   Diagnosis Date     Hyperlipidemia      Syncope and collapse        Past Surgical History:   Procedure Laterality Date     ARTHROTOMY WRIST Right 6/10/2016    Procedure: ARTHROTOMY WRIST;  Surgeon: Toni Ewing MD;  Location: [facility]     BONE MARROW ASPIRATION ONLY          Family History   Problem Relation Age of Onset     Breast Cancer Mother      Cardiovascular Father         CHF     Diabetes Father 66        Type II     Alzheimer Disease Maternal Grandmother      Respiratory Paternal Grandfather         emphysema     Blood Disease Brother 4        leukemia     Diabetes Daughter         type I       Social History     Socioeconomic History     Marital status:      Spouse name: Not on file     Number of children: Not on file     Years of education: Not on file     Highest education level: Not on file   Occupational History     Not on file   Social Needs     Financial resource strain: Not on file     Food insecurity     Worry: Not on file     Inability: Not on file     Transportation needs     Medical: Not on file     Non-medical: Not on file   Tobacco Use     Smoking status: Never Smoker      Smokeless tobacco: Never Used   Substance and Sexual Activity     Alcohol use: Yes     Comment: occ     Drug use: No     Sexual activity: Yes     Partners: Female   Lifestyle     Physical activity     Days per week: Not on file     Minutes per session: Not on file     Stress: Not on file   Relationships     Social connections     Talks on phone: Not on file     Gets together: Not on file     Attends Jew service: Not on file     Active member of club or organization: Not on file     Attends meetings of clubs or organizations: Not on file     Relationship status: Not on file     Intimate partner violence     Fear of current or ex partner: Not on file     Emotionally abused: Not on file     Physically abused: Not on file     Forced sexual activity: Not on file   Other Topics Concern     Parent/sibling w/ CABG, MI or angioplasty before 65F 55M? No   Social History Narrative     Not on file       Outpatient Encounter Medications as of 2020   Medication Sig Dispense Refill     Acetaminophen (TYLENOL PO) Take 1,300 mg by mouth every 8 hours as needed for mild pain or fever       allopurinol (ZYLOPRIM) 300 MG tablet Take 1 tablet (300 mg) by mouth daily 90 tablet 3     fluconazole (DIFLUCAN) 150 MG tablet Take 1 tablet (150 mg) by mouth daily Take once a week for 3 weeks 3 tablet 3     [] fluconazole (DIFLUCAN) 150 MG tablet Take 1 tablet (150 mg) by mouth once for 1 dose 1 tablet 5     IBUPROFEN PO Take 800 mg by mouth every 8 hours as needed for moderate pain       lisinopril (ZESTRIL) 10 MG tablet Take 1 tablet (10 mg) by mouth daily 90 tablet 3     No facility-administered encounter medications on file as of 2020.              Review Of Systems  Skin: As above  Eyes: negative  Ears/Nose/Throat: negative  Respiratory: No shortness of breath, dyspnea on exertion, cough, or hemoptysis  Cardiovascular: negative  Gastrointestinal: negative  Genitourinary: negative  Musculoskeletal:  negative  Neurologic: negative  Psychiatric: negative  Hematologic/Lymphatic/Immunologic: negative  Endocrine: negative      O:   NAD, WDWN, Alert & Oriented, Mood & Affect wnl, Vitals stable   Here today alone   There were no vitals taken for this visit.   General appearance normal   Vitals stable   Alert, oriented and in no acute distress      Following lymph nodes palpated: Occipital, Cervical, Supraclavicular no lad   Forehead stuck on papule      Stuck on papules and brown macules on trunk and ext   Red papules on trunk  Flesh colored papules on trunk     The remainder of the full exam was normal; the following areas were examined:  conjunctiva/lids, oral mucosa, neck, peripheral vascular system, abdomen, lymph nodes, digits/nails, eccrine and apocrine glands, scalp/hair, face, neck, chest, abdomen, buttocks, back, RUE, LUE, RLE, LLE       Eyes: Conjunctivae/lids:Normal     ENT: Lips, buccal mucosa, tongue: normal    MSK:Normal    Cardiovascular: peripheral edema none    Pulm: Breathing Normal    Lymph Nodes: No Head and Neck Lymphadenopathy     Neuro/Psych: Orientation:Alert and Orientedx3 ; Mood/Affect:normal       A/P:  1. Seborrheic keratosis, lentigo, angioma, dermal nevus  2. Tinea versicolor  diflfucan prn     BENIGN LESIONS DISCUSSED WITH PATIENT:  I discussed the specifics of tumor, prognosis, and genetics of benign lesions.  I explained that treatment of these lesions would be purely cosmetic and not medically neccessary.  I discussed with patient different removal options including excision, cautery and /or laser.      Nature and genetics of benign skin lesions dicussed with patient.  Signs and Symptoms of skin cancer discussed with patient.  Patient encouraged to perform monthly skin exams.  UV precautions reviewed with patient.  Skin care regimen reviewed with patient: Eliminate harsh soaps, i.e. Dial, zest, irsih spring; Mild soaps such as Cetaphil or Dove sensitive skin, avoid hot or cold  showers, aggressive use of emollients including vanicream, cetaphil or cerave discussed with patient.    Risks of non-melanoma skin cancer discussed with patient   Return to clinic 12 months      Again, thank you for allowing me to participate in the care of your patient.        Sincerely,        Mino Guzman MD

## 2020-07-27 NOTE — NURSING NOTE
"Initial /89   Pulse 72   Ht 1.803 m (5' 11\")   BMI 37.66 kg/m   Estimated body mass index is 37.66 kg/m  as calculated from the following:    Height as of this encounter: 1.803 m (5' 11\").    Weight as of 11/25/19: 122.5 kg (270 lb). .      "

## 2020-10-20 ENCOUNTER — RECORDS - HEALTHEAST (OUTPATIENT)
Dept: LAB | Facility: CLINIC | Age: 50
End: 2020-10-20

## 2020-10-20 LAB
SARS-COV-2 PCR COMMENT: NORMAL
SARS-COV-2 RNA SPEC QL NAA+PROBE: NEGATIVE
SARS-COV-2 VIRUS SPECIMEN SOURCE: NORMAL

## 2020-11-14 ENCOUNTER — HEALTH MAINTENANCE LETTER (OUTPATIENT)
Age: 50
End: 2020-11-14

## 2021-03-26 ENCOUNTER — OFFICE VISIT (OUTPATIENT)
Dept: FAMILY MEDICINE | Facility: CLINIC | Age: 51
End: 2021-03-26
Payer: COMMERCIAL

## 2021-03-26 ENCOUNTER — ANCILLARY PROCEDURE (OUTPATIENT)
Dept: GENERAL RADIOLOGY | Facility: CLINIC | Age: 51
End: 2021-03-26
Attending: FAMILY MEDICINE
Payer: COMMERCIAL

## 2021-03-26 VITALS
OXYGEN SATURATION: 98 % | HEART RATE: 66 BPM | DIASTOLIC BLOOD PRESSURE: 74 MMHG | TEMPERATURE: 97.3 F | BODY MASS INDEX: 37.49 KG/M2 | WEIGHT: 268.8 LBS | SYSTOLIC BLOOD PRESSURE: 118 MMHG

## 2021-03-26 DIAGNOSIS — M25.561 CHRONIC PAIN OF RIGHT KNEE: ICD-10-CM

## 2021-03-26 DIAGNOSIS — G89.29 CHRONIC PAIN OF RIGHT KNEE: ICD-10-CM

## 2021-03-26 DIAGNOSIS — Z00.00 ENCOUNTER FOR ROUTINE ADULT HEALTH EXAMINATION WITHOUT ABNORMAL FINDINGS: Primary | ICD-10-CM

## 2021-03-26 DIAGNOSIS — Z23 NEED FOR VACCINATION: ICD-10-CM

## 2021-03-26 DIAGNOSIS — E78.5 HYPERLIPIDEMIA LDL GOAL <100: ICD-10-CM

## 2021-03-26 DIAGNOSIS — I10 ESSENTIAL HYPERTENSION WITH GOAL BLOOD PRESSURE LESS THAN 130/80: ICD-10-CM

## 2021-03-26 DIAGNOSIS — E66.01 MORBID OBESITY (H): ICD-10-CM

## 2021-03-26 DIAGNOSIS — Z12.11 COLON CANCER SCREENING: ICD-10-CM

## 2021-03-26 DIAGNOSIS — M10.9 ACUTE GOUTY ARTHRITIS: ICD-10-CM

## 2021-03-26 LAB
ANION GAP SERPL CALCULATED.3IONS-SCNC: 3 MMOL/L (ref 3–14)
BUN SERPL-MCNC: 18 MG/DL (ref 7–30)
CALCIUM SERPL-MCNC: 8.9 MG/DL (ref 8.5–10.1)
CHLORIDE SERPL-SCNC: 109 MMOL/L (ref 94–109)
CHOLEST SERPL-MCNC: 224 MG/DL
CO2 SERPL-SCNC: 27 MMOL/L (ref 20–32)
CREAT SERPL-MCNC: 1.05 MG/DL (ref 0.66–1.25)
GFR SERPL CREATININE-BSD FRML MDRD: 82 ML/MIN/{1.73_M2}
GLUCOSE SERPL-MCNC: 92 MG/DL (ref 70–99)
HDLC SERPL-MCNC: 45 MG/DL
LDLC SERPL CALC-MCNC: 157 MG/DL
NONHDLC SERPL-MCNC: 179 MG/DL
POTASSIUM SERPL-SCNC: 4.1 MMOL/L (ref 3.4–5.3)
SODIUM SERPL-SCNC: 139 MMOL/L (ref 133–144)
TRIGL SERPL-MCNC: 110 MG/DL

## 2021-03-26 PROCEDURE — 73562 X-RAY EXAM OF KNEE 3: CPT | Mod: RT | Performed by: RADIOLOGY

## 2021-03-26 PROCEDURE — 90471 IMMUNIZATION ADMIN: CPT | Performed by: FAMILY MEDICINE

## 2021-03-26 PROCEDURE — 80048 BASIC METABOLIC PNL TOTAL CA: CPT | Performed by: FAMILY MEDICINE

## 2021-03-26 PROCEDURE — 36415 COLL VENOUS BLD VENIPUNCTURE: CPT | Performed by: FAMILY MEDICINE

## 2021-03-26 PROCEDURE — 99396 PREV VISIT EST AGE 40-64: CPT | Mod: 25 | Performed by: FAMILY MEDICINE

## 2021-03-26 PROCEDURE — 99214 OFFICE O/P EST MOD 30 MIN: CPT | Mod: 25 | Performed by: FAMILY MEDICINE

## 2021-03-26 PROCEDURE — 80061 LIPID PANEL: CPT | Performed by: FAMILY MEDICINE

## 2021-03-26 PROCEDURE — 90715 TDAP VACCINE 7 YRS/> IM: CPT | Performed by: FAMILY MEDICINE

## 2021-03-26 RX ORDER — ALLOPURINOL 300 MG/1
1 TABLET ORAL DAILY
Qty: 90 TABLET | Refills: 3 | Status: SHIPPED | OUTPATIENT
Start: 2021-03-26 | End: 2022-05-20

## 2021-03-26 RX ORDER — LISINOPRIL 10 MG/1
10 TABLET ORAL DAILY
Qty: 90 TABLET | Refills: 3 | Status: SHIPPED | OUTPATIENT
Start: 2021-03-26 | End: 2022-05-20

## 2021-03-26 NOTE — RESULT ENCOUNTER NOTE
Please inform patient that test result was within normal parameters except for his cholesterol was a bit high .   Patient is advised to work on diet .   Thank you.     Mina Garcia M.D.

## 2021-03-26 NOTE — PROGRESS NOTES
SUBJECTIVE:   CC: Alen Gibbs is an 50 year old male who presents for preventative health visit.     50 yr old male here for his annual physical. He has a past medical history significant for hypertension and chronic gout. He is requesting refills on his chronic medication.     Also c/o of right knee pain - ongoing for a few years. Clicks sometimes when he walks. Preventing him from exercising. Denies any swelling or warmth. Had an MRI in 2017 which showed chondromalacia. Has progressively worsened over time. Has not been evaluated by orthopedist or sports medicine.      Patient has been advised of split billing requirements and indicates understanding: Yes  Healthy Habits:    Getting at least 3 servings of Calcium per day:  Yes    Bi-annual eye exam:  Yes    Dental care twice a year:  Yes    Sleep apnea or symptoms of sleep apnea:  None    Diet:  Regular (no restrictions)    Frequency of exercise:  None    Taking medications regularly:  Yes    Barriers to taking medications:  None    Medication side effects:  None    PHQ-2 Total Score:    Additional concerns today:  Yes          Joint or Musculoskeletal Pain  Duration of complaint: years   Description:   Location: right knee  Character: Sharp  Intensity: mild, moderate  Progression of Symptoms: worse  Accompanying Signs & Symptoms: Other symptoms: none  History: Previous similar pain: no     Precipitating factors: Trauma or overuse: YES- running  Alleviating factors: Improved by: nothing  Therapies Tried and outcome: Tried PT      Hypertension Follow-up      Do you check your blood pressure regularly outside of the clinic? No     Are you following a low salt diet? Yes    Are your blood pressures ever more than 140 on the top number (systolic) OR more   than 90 on the bottom number (diastolic), for example 140/90? No      Today's PHQ-2 Score:   PHQ-2 ( 1999 [employer]) 6/7/2019   Q1: Little interest or pleasure in doing things 0   Q2: Feeling down, depressed or  hopeless 0   PHQ-2 Score 0       Abuse: Current or Past(Physical, Sexual or Emotional)- No  Do you feel safe in your environment? Yes    Have you ever done Advance Care Planning? (For example, a Health Directive, POLST, or a discussion with a medical provider or your loved ones about your wishes): No, advance care planning information given to patient to review.  Patient plans to discuss their wishes with loved ones or provider.      Social History     Tobacco Use     Smoking status: Never Smoker     Smokeless tobacco: Never Used   Substance Use Topics     Alcohol use: Yes     Comment: occ     If you drink alcohol do you typically have >3 drinks per day or >7 drinks per week? No    Alcohol Use 3/26/2021   Prescreen: >3 drinks/day or >7 drinks/week? No       Last PSA: No results found for: PSA    Reviewed orders with patient. Reviewed health maintenance and updated orders accordingly - Yes      Reviewed and updated as needed this visit by clinical staff  Tobacco  Allergies  Meds  Problems  Med Hx  Surg Hx  Fam Hx          Reviewed and updated as needed this visit by Provider  Tobacco  Allergies  Meds  Problems  Med Hx  Surg Hx  Fam Hx         Past Medical History:   Diagnosis Date     Hyperlipidemia      Syncope and collapse       Past Surgical History:   Procedure Laterality Date     ARTHROTOMY WRIST Right 6/10/2016    Procedure: ARTHROTOMY WRIST;  Surgeon: Toni Ewing MD;  Location: [facility]     BONE MARROW ASPIRATION ONLY         Review of Systems  CONSTITUTIONAL: NEGATIVE for fever, chills, change in weight  INTEGUMENTARY/SKIN: NEGATIVE for worrisome rashes, moles or lesions  EYES: NEGATIVE for vision changes or irritation  ENT: NEGATIVE for ear, mouth and throat problems  RESP: NEGATIVE for significant cough or SOB  CV: NEGATIVE for chest pain, palpitations or peripheral edema  GI: NEGATIVE for nausea, abdominal pain, heartburn, or change in bowel habits   male: negative for dysuria,  hematuria, decreased urinary stream, erectile dysfunction, urethral discharge  MUSCULOSKELETAL:POSITIVE  for joint pain , joint stiffness and joint swelling   NEURO: NEGATIVE for weakness, dizziness or paresthesias  PSYCHIATRIC: NEGATIVE for changes in mood or affect    OBJECTIVE:   /74   Pulse 66   Temp 97.3  F (36.3  C) (Tympanic)   Wt 121.9 kg (268 lb 12.8 oz)   SpO2 98%   BMI 37.49 kg/m      Physical Exam  GENERAL: healthy, alert and no distress  EYES: Eyes grossly normal to inspection, PERRL and conjunctivae and sclerae normal  HENT: ear canals and TM's normal, nose and mouth without ulcers or lesions  NECK: no adenopathy, no asymmetry, masses, or scars and thyroid normal to palpation  RESP: lungs clear to auscultation - no rales, rhonchi or wheezes  CV: regular rate and rhythm, normal S1 S2, no S3 or S4, no murmur, click or rub, no peripheral edema and peripheral pulses strong  ABDOMEN: soft, nontender, no hepatosplenomegaly, no masses and bowel sounds normal  MS: decreased range of motion right knee pain  SKIN: no suspicious lesions or rashes  PSYCH: mentation appears normal, affect normal/bright    Diagnostic Test Results:  Pending labs    ASSESSMENT/PLAN:   1. Encounter for routine adult health examination without abnormal findings  Patient is a 50 yr old male here for his annual physical. Discussed preventive measures- discussed immunizations and this were updated.   - Basic metabolic panel  (Ca, Cl, CO2, Creat, Gluc, K, Na, BUN)    2. Acute gouty arthritis  Medication is refilled. No new events.  - allopurinol (ZYLOPRIM) 300 MG tablet; Take 1 tablet (300 mg) by mouth daily  Dispense: 90 tablet; Refill: 3  - OFFICE/OUTPT VISIT,EST,LEVL IV    3. Essential hypertension with goal blood pressure less than 130/80  Blood pressure is within normal limits.   - lisinopril (ZESTRIL) 10 MG tablet; Take 1 tablet (10 mg) by mouth daily  Dispense: 90 tablet; Refill: 3  - OFFICE/OUTPT VISIT,EST,LEVL IV    4.  "Need for vaccination  Immunization updated.   - TDAP VACCINE (Adacel, Boostrix)  [[id number]]    5. Hyperlipidemia LDL goal <100  - Lipid panel reflex to direct LDL Fasting  - OFFICE/OUTPT VISIT,EST,LEVL IV    6. Chronic pain of right knee  X-rays done.Appears to have some degenerative disease. Recommend seeing orthopedist.   - Orthopedic & Spine  Referral; Future  - XR Knee Right 3 Views; Future  - OFFICE/OUTPT VISIT,EST,LEVL IV    7. Colon cancer screening  - GASTROENTEROLOGY ADULT REF PROCEDURE ONLY; Future    Patient has been advised of split billing requirements and indicates understanding: Yes  COUNSELING:   Reviewed preventive health counseling, as reflected in patient instructions       Regular exercise       Healthy diet/nutrition       Vision screening    Estimated body mass index is 37.49 kg/m  as calculated from the following:    Height as of 7/27/20: 1.803 m (5' 11\").    Weight as of this encounter: 121.9 kg (268 lb 12.8 oz).     Weight management plan: Discussed healthy diet and exercise guidelines    He reports that he has never smoked. He has never used smokeless tobacco.      Counseling Resources:  ATP IV Guidelines  Pooled Cohorts Equation Calculator  FRAX Risk Assessment  ICSI Preventive Guidelines  Dietary Guidelines for Americans, 2010  USDA's MyPlate  ASA Prophylaxis  Lung CA Screening    Mina Garcia MD  [facility]  3  "

## 2021-03-30 ENCOUNTER — MEDICAL CORRESPONDENCE (OUTPATIENT)
Dept: HEALTH INFORMATION MANAGEMENT | Facility: CLINIC | Age: 51
End: 2021-03-30

## 2021-03-30 ENCOUNTER — TRANSFERRED RECORDS (OUTPATIENT)
Dept: HEALTH INFORMATION MANAGEMENT | Facility: CLINIC | Age: 51
End: 2021-03-30

## 2021-03-30 DIAGNOSIS — Z11.59 ENCOUNTER FOR SCREENING FOR OTHER VIRAL DISEASES: ICD-10-CM

## 2021-04-13 ENCOUNTER — OFFICE VISIT (OUTPATIENT)
Dept: FAMILY MEDICINE | Facility: CLINIC | Age: 51
End: 2021-04-13
Payer: COMMERCIAL

## 2021-04-13 VITALS
OXYGEN SATURATION: 96 % | RESPIRATION RATE: 16 BRPM | DIASTOLIC BLOOD PRESSURE: 70 MMHG | HEART RATE: 85 BPM | WEIGHT: 269 LBS | SYSTOLIC BLOOD PRESSURE: 100 MMHG | BODY MASS INDEX: 37.66 KG/M2 | HEIGHT: 71 IN | TEMPERATURE: 97.5 F

## 2021-04-13 DIAGNOSIS — I10 ESSENTIAL HYPERTENSION WITH GOAL BLOOD PRESSURE LESS THAN 130/80: ICD-10-CM

## 2021-04-13 DIAGNOSIS — Z01.818 PREOP GENERAL PHYSICAL EXAM: Primary | ICD-10-CM

## 2021-04-13 DIAGNOSIS — E66.01 MORBID OBESITY (H): ICD-10-CM

## 2021-04-13 DIAGNOSIS — M17.11 PRIMARY OSTEOARTHRITIS OF RIGHT KNEE: ICD-10-CM

## 2021-04-13 DIAGNOSIS — Z11.59 ENCOUNTER FOR SCREENING FOR OTHER VIRAL DISEASES: ICD-10-CM

## 2021-04-13 LAB
ERYTHROCYTE [DISTWIDTH] IN BLOOD BY AUTOMATED COUNT: 13.2 % (ref 10–15)
HCT VFR BLD AUTO: 44.2 % (ref 40–53)
HGB BLD-MCNC: 14.5 G/DL (ref 13.3–17.7)
MCH RBC QN AUTO: 30.3 PG (ref 26.5–33)
MCHC RBC AUTO-ENTMCNC: 32.8 G/DL (ref 31.5–36.5)
MCV RBC AUTO: 93 FL (ref 78–100)
PLATELET # BLD AUTO: 173 10E9/L (ref 150–450)
RBC # BLD AUTO: 4.78 10E12/L (ref 4.4–5.9)
SARS-COV-2 RNA RESP QL NAA+PROBE: NORMAL
SPECIMEN SOURCE: NORMAL
WBC # BLD AUTO: 8 10E9/L (ref 4–11)

## 2021-04-13 PROCEDURE — 87635 SARS-COV-2 COVID-19 AMP PRB: CPT | Performed by: SURGERY

## 2021-04-13 PROCEDURE — 99214 OFFICE O/P EST MOD 30 MIN: CPT | Performed by: FAMILY MEDICINE

## 2021-04-13 PROCEDURE — 85027 COMPLETE CBC AUTOMATED: CPT | Performed by: FAMILY MEDICINE

## 2021-04-13 PROCEDURE — 36415 COLL VENOUS BLD VENIPUNCTURE: CPT | Performed by: FAMILY MEDICINE

## 2021-04-13 ASSESSMENT — MIFFLIN-ST. JEOR: SCORE: 2093.34

## 2021-04-13 ASSESSMENT — PAIN SCALES - GENERAL: PAINLEVEL: NO PAIN (1)

## 2021-04-13 NOTE — PROGRESS NOTES
[facility]  [address] [address]  Phone: [phone]  Primary Provider: Nito Pruitt  Pre-op Performing Provider: AARTI GIRALDO      PREOPERATIVE EVALUATION:  Today's date: 4/13/2021    Alen Gibbs is a 50 year old male who presents for a preoperative evaluation.    Surgical Information:  Surgery/Procedure: Right Total Knee Arthroplasty  Surgery Location: [facility]   Surgeon: Dr. Landin   Surgery Date: 5/6/2021  Time of Surgery: 12:30 pm   Where patient plans to recover: At home with family  Fax number for surgical facility: Note does not need to be faxed, will be available electronically in Epic.    Type of Anesthesia Anticipated: Spinal    Assessment & Plan     The proposed surgical procedure is considered INTERMEDIATE risk.    Preop general physical exam  Mets>4   No chest pain or shortness of breath at rest or with activity.   No prior blood clots   No indication for ECG per [place] guidelines.   Will obtain CBC today. Recently had BMP completed 3/26/2021 which was satisfactory.   - CBC with platelets    Primary osteoarthritis of right knee  Scheduled for surgery 5/6/2021.   Xray 3/26/2021 with advanced medial compartment joint space narrowing.     Essential hypertension with goal blood pressure less than 130/80  On Lisinopril 10 mg daily. Stable. Reports missing a dose yesterday and BP was elevated at 140/104.   Will continue Lisinopril morning of surgery per [place] guidelines.     Medication Instructions:  Patient is to take all scheduled medications on the day of surgery EXCEPT for modifications listed below:  No NSAID's at least 3 days prior. Naproxen not to be taken 4 days prior.   No Tylenol day prior.     RECOMMENDATION:  APPROVAL GIVEN to proceed with proposed procedure pending review of diagnostic evaluation.      Subjective   HPI related to upcoming procedure: Overuse injury over the years. Does a significant amount of  walking at work and used to run.     Mets>4   No chest pain or shortness of breath at rest or with activity.   No prior blood clots.   No cardiac stents, or any stents.   Using Tylenol and Ibuprofen rarely.     History of gout   Would have this in his feet in the past. Nothing currently. Currently on allopurinol 300 mg daily.    History of HTN  Currently taking lisinopril 10 mg daily.  Blood pressure well controlled at 104/70 today in clinic. Reports missed a dose yesterday and took BP and it was elevated at 140/104.     Preop Questions 4/13/2021   1. Have you ever had a heart attack or stroke? No   2. Have you ever had surgery on your heart or blood vessels, such as a stent placement, a coronary artery bypass, or surgery on an artery in your head, neck, heart, or legs? No   3. Do you have chest pain with activity? No   4. Do you have a history of  heart failure? No   5. Do you currently have a cold, bronchitis or symptoms of other infection? No   6. Do you have a cough, shortness of breath, or wheezing? No   7. Do you or anyone in your family have previous history of blood clots? No   8. Do you or does anyone in your family have a serious bleeding problem such as prolonged bleeding following surgeries or cuts? No   9. Have you ever had problems with anemia or been told to take iron pills? No   10. Have you had any abnormal blood loss such as black, tarry or bloody stools? No   11. Have you ever had a blood transfusion? No   12. Are you willing to have a blood transfusion if it is medically needed before, during, or after your surgery? Yes   13. Have you or any of your relatives ever had problems with anesthesia? No   14. Do you have sleep apnea, excessive snoring or daytime drowsiness? No   15. Do you have any artifical heart valves or other implanted medical devices like a pacemaker, defibrillator, or continuous glucose monitor? No   16. Do you have artificial joints? No   17. Are you allergic to latex? No      Health Care Directive:  Patient does not have a Health Care Directive or Living Will: Discussed advance care planning with patient; information given to patient to review.    Preoperative Review of :   reviewed - no record of controlled substances prescribed.      Review of Systems  Constitutional: Negative fevers, chills, night sweats  HEENT: Negative vision changes, hearing changes, difficulty with swallowing.  CV: Negative chest pain, palpitations.  Resp: Negative shortness of breath, significant cough, wheezing  GI: Negative nausea, vomiting, diarrhea, constipation, blood in stool  : Negative dysuria, hematuria, incontinence.  Integumentary: Negative rash   Psych: Negative mood changes       Patient Active Problem List    Diagnosis Date Noted     Dehydration 07/03/2019     Priority: Medium     Tinea corporis 06/07/2019     Priority: Medium     Obesity (BMI 35.0-39.9) with comorbidity (H) 03/01/2019     Priority: Medium     Chondromalacia of patella, right 01/08/2018     Priority: Medium     Essential hypertension with goal blood pressure less than 130/80 05/20/2016     Priority: Medium     Tinea versicolor 01/15/2016     Priority: Medium     CARDIOVASCULAR SCREENING; LDL GOAL LESS THAN 130 07/21/2015     Priority: Medium     Mixed hyperlipidemia 01/25/2010     Priority: Medium      Past Medical History:   Diagnosis Date     Hyperlipidemia      Syncope and collapse      Past Surgical History:   Procedure Laterality Date     ARTHROTOMY WRIST Right 6/10/2016    Procedure: ARTHROTOMY WRIST;  Surgeon: Toni Ewing MD;  Location: [facility]     BONE MARROW ASPIRATION ONLY       Current Outpatient Medications   Medication Sig Dispense Refill     Acetaminophen (TYLENOL PO) Take 1,300 mg by mouth every 8 hours as needed for mild pain or fever       allopurinol (ZYLOPRIM) 300 MG tablet Take 1 tablet (300 mg) by mouth daily 90 tablet 3     IBUPROFEN PO Take 800 mg by mouth every 8 hours as needed for moderate  "pain       lisinopril (ZESTRIL) 10 MG tablet Take 1 tablet (10 mg) by mouth daily 90 tablet 3     fluconazole (DIFLUCAN) 150 MG tablet Take 1 tablet (150 mg) by mouth daily Take once a week for 3 weeks (Patient not taking: Reported on 4/13/2021) 3 tablet 3       Allergies   Allergen Reactions     Norco [Hydrocodone-Acetaminophen] Nausea     Simvastatin Other (See Comments)     fasciatus        Social History     Tobacco Use     Smoking status: Never Smoker     Smokeless tobacco: Never Used   Substance Use Topics     Alcohol use: Yes     Comment: occ         Objective     /70 (BP Location: Right arm, Patient Position: Chair, Cuff Size: Adult Large)   Pulse 85   Temp 97.5  F (36.4  C) (Tympanic)   Resp 16   Ht 1.797 m (5' 10.75\")   Wt 122 kg (269 lb)   SpO2 96%   BMI 37.78 kg/m      Physical Exam  General: Alert, cooperative, no acute distress  Head: Normocephalic, atraumatic   Eyes: PERRL, no scleral icterus, no conjunctival injection   Ears: External ear without deformity, external canals patent, TM intact with no signs of infection   Nose: nares patent  Throat: Oropharynx clear, non-erythematous, tonsils non-enlarged   Neck: supple, trachea midline, no goiter   CV: RRR, no murmur   Lungs: Clear, non-labored breathing, No rales or rhonchi   Abdomen: Bowel sounds active, soft, non-tender, no guarding.   Extremities: No peripheral edema, calves non-tender   MSK: Right knee: no swelling, erythema, deformity. Tender to palpation on medial knee.   Neuro: CN II-XII grossly intact. No focal deficits. Sensation intact.       Recent Labs   Lab Test 03/26/21  0840 11/25/19  1330 07/03/19  0946   HGB  --  15.4 14.9   PLT  --  215 176    140 141   POTASSIUM 4.1 4.1 4.1   CR 1.05 1.10 1.16        Diagnostics:  CBC ordered today.   Recent BMP on 3/26/2021   No EKG required, no history of coronary heart disease, significant arrhythmia, peripheral arterial disease or other structural heart disease.    Revised " Cardiac Risk Index (RCRI):  The patient has the following serious cardiovascular risks for perioperative complications:   - No serious cardiac risks = 0 points     RCRI Interpretation: 0 points: Class I (very low risk - 0.4% complication rate)         Signed Electronically by: Palmer Colmenares DO  Copy of this evaluation report is provided to requesting physician.

## 2021-04-13 NOTE — RESULT ENCOUNTER NOTE
CBC testing has returned satisfactory and within normal limits.   Best of luck with your upcoming surgery.  Let me know if you have any questions prior.    Dr. Palmer Colmenares

## 2021-04-13 NOTE — PATIENT INSTRUCTIONS
Continue all medications as prescribed.     Avoid NSAID's at least 2 days prior, more is better.     Preparing for Your Surgery  Getting started  A nurse will call you to review your health history and instructions. They will give you an arrival time based on your scheduled surgery time.  Please be ready to share the following:    Your doctor's clinic name and phone number    Your medical, surgical and anesthesia history    A list of allergies and sensitivities    A list of medicines, including herbal treatments and over-the-counter drugs    Whether the patient has a legal guardian (ask how to send us the papers in advance)  If you have a child who's having surgery, please ask for a copy of Preparing for Your Child's Surgery.    Preparing for surgery    Within 30 days of surgery: Have a pre-op exam (sometimes called an H&P, or History and Physical). This can be done at a clinic or pre-operative center.  ? If you're having a , you may not need this exam. Talk to your care team    At your pre-op exam, talk to your care team about all medicines you take. If you need to stop any medicines before surgery, ask when to start taking them again.  ? We do this for your safety. Many medicines can make you bleed too much during surgery. Some change how well surgery (anesthesia) drugs work.    Call your insurance company to let them know you're having surgery. (If you don't have insurance, call [phone].)    Call your clinic if there's any change in your health. This includes signs of a cold or flu (sore throat, runny nose, cough, rash, fever). It also includes a scrape or scratch near the surgery site.    If you have questions on the day of surgery, call your hospital or surgery center.  Eating and drinking guidelines  For your safety: Unless your surgeon tells you otherwise, follow the guidelines below.    Eat and drink as usual until 8 hours before surgery. After that, no food or milk.    Drink clear liquids  until 2 hours before surgery. These are liquids you can see through, like water, Gatorade and Propel Water. You may also have black coffee and tea (no cream or milk).    Nothing by mouth within 2 hours of surgery. This includes gum, candy and breath mints.    If you drink, stop drinking alcohol the night before surgery.    If your care team tells you to take medicine on the morning of surgery, it's okay to take it with a sip of water.  Preventing infection    Shower or bathe the night before and morning of your surgery. Follow the instructions your clinic gave you. (If no instructions, use regular soap.)    Don't shave or clip hair near your surgery site. We'll remove the hair if needed.    Don't smoke or vape the morning of surgery. You may chew nicotine gum up to 2 hours before surgery. A nicotine patch is okay.  ? Note: Some surgeries require you to completely quit smoking and nicotine. Check with your surgeon.    Your care team will make every effort to keep you safe from infection. We will:  ? Clean our hands often with soap and water (or an alcohol-based hand rub).  ? Clean the skin at your surgery site with a special soap that kills germs.  ? Give you a special gown to keep you warm. (Cold raises the risk of infection.)  ? Wear special hair covers, masks, gowns and gloves during surgery.  ? Give antibiotic medicine, if prescribed. Not all surgeries need antibiotics.  What to bring on the day of surgery    Photo ID and insurance card    Copy of your health care directive, if you have one    Glasses and hearing aides (bring cases)  ? You can't wear contacts during surgery    Inhaler and eye drops, if you use them (tell us about these when you arrive)    CPAP machine or breathing device, if you use them    A few personal items, if spending the night    If you have . . .  ? A pacemaker or ICD (cardiac defibrillator): Bring the ID card.  ? An implanted stimulator: Bring the remote control.  ? A legal guardian:  Bring a copy of the certified (court-stamped) guardianship papers.  Please remove any jewelry, including body piercings. Leave jewelry and other valuables at home.  If you're going home the day of surgery  Important: If you don't follow the rules below, we must cancel your surgery.     Arrange for someone to drive you home after surgery. You may not drive, take a taxi or take public transportation by yourself (unless you'll have local anesthesia only).    Arrange for a responsible adult to stay with you overnight. If you don't, we may keep you in the hospital overnight, and you may need to pay the costs yourself.  Questions?   If you have any questions for your care team, list them here: _________________________________________________________________________________________________________________________________________________________________________________________________________________________________________________________________________________________________________________________  For informational purposes only. Not to replace the advice of your health care provider. Copyright   2003, 2019 [facility]. All rights reserved. Clinically reviewed by Rand Thomas MD. [employer] [id number] - REV 4/20.

## 2021-04-14 ENCOUNTER — ANESTHESIA EVENT (OUTPATIENT)
Dept: GASTROENTEROLOGY | Facility: CLINIC | Age: 51
End: 2021-04-14
Payer: COMMERCIAL

## 2021-04-14 LAB
LABORATORY COMMENT REPORT: NORMAL
SARS-COV-2 RNA RESP QL NAA+PROBE: NEGATIVE
SPECIMEN SOURCE: NORMAL

## 2021-04-14 NOTE — ANESTHESIA PREPROCEDURE EVALUATION
Anesthesia Pre-Procedure Evaluation    Patient: Alen Gibbs   MRN: [id number] : 1970        Preoperative Diagnosis: Colon cancer screening [Z12.11]   Procedure : Procedure(s):  COLONOSCOPY     Past Medical History:   Diagnosis Date     Hyperlipidemia      Syncope and collapse       Past Surgical History:   Procedure Laterality Date     ARTHROTOMY WRIST Right 6/10/2016    Procedure: ARTHROTOMY WRIST;  Surgeon: Toni Ewing MD;  Location: [facility] OR     BONE MARROW ASPIRATION ONLY        Allergies   Allergen Reactions     Norco [Hydrocodone-Acetaminophen] Nausea     Simvastatin Other (See Comments)     fasciatus      Social History     Tobacco Use     Smoking status: Never Smoker     Smokeless tobacco: Never Used   Substance Use Topics     Alcohol use: Yes     Comment: occ      Wt Readings from Last 1 Encounters:   21 122 kg (269 lb)        Anesthesia Evaluation   Pt has had prior anesthetic.         ROS/MED HX  ENT/Pulmonary:  - neg pulmonary ROS     Neurologic:       Cardiovascular:     (+) Dyslipidemia hypertension-----fainting (syncope) (hx).     METS/Exercise Tolerance:     Hematologic:       Musculoskeletal:       GI/Hepatic:       Renal/Genitourinary:       Endo:     (+) Obesity,     Psychiatric/Substance Use:       Infectious Disease:       Malignancy:       Other:            Physical Exam    Airway        Mallampati: I   TM distance: > 3 FB   Neck ROM: full   Mouth opening: > 3 cm    Respiratory Devices and Support         Dental  no notable dental history         Cardiovascular   cardiovascular exam normal          Pulmonary   pulmonary exam normal                OUTSIDE LABS:  CBC:   Lab Results   Component Value Date    WBC 8.0 2021    WBC 7.1 2019    HGB 14.5 2021    HGB 15.4 2019    HCT 44.2 2021    HCT 48.0 2019     2021     2019     BMP:   Lab Results   Component Value Date     2021     2019     POTASSIUM 4.1 03/26/2021    POTASSIUM 4.1 11/25/2019    CHLORIDE 109 03/26/2021    CHLORIDE 113 (H) 11/25/2019    CO2 27 03/26/2021    CO2 20 11/25/2019    BUN 18 03/26/2021    BUN 15 11/25/2019    CR 1.05 03/26/2021    CR 1.10 11/25/2019    GLC 92 03/26/2021    GLC 88 11/25/2019     COAGS:   Lab Results   Component Value Date    PTT 31 02/22/2019    INR 0.97 02/22/2019     POC:   Lab Results   Component Value Date     (H) 02/22/2019     HEPATIC:   Lab Results   Component Value Date    ALBUMIN 3.5 11/25/2019    PROTTOTAL 7.2 11/25/2019    ALT 52 11/25/2019    AST 26 11/25/2019    ALKPHOS 67 11/25/2019    BILITOTAL 0.3 11/25/2019     OTHER:   Lab Results   Component Value Date    CHRISTINA 8.9 03/26/2021    LIPASE 71 (L) 04/29/2019    TSH 0.98 06/07/2019    T4 1.06 06/07/2019    CRP 19.9 (H) 07/27/2018    SED 10 10/06/2009       Anesthesia Plan    ASA Status:  2   NPO Status:  NPO Appropriate    Anesthesia Type: MAC.              Consents    Anesthesia Plan(s) and associated risks, benefits, and realistic alternatives discussed. Questions answered and patient/representative(s) expressed understanding.     - Discussed with:  Patient         Postoperative Care       PONV prophylaxis: Ondansetron (or other 5HT-3), Dexamethasone or Solumedrol     Comments:         H&P reviewed: Unable to attach H&P to encounter due to EHR limitations. H&P Update: appropriate H&P reviewed, patient examined. No interval changes since H&P (within 30 days).         MACY Win CRNA

## 2021-04-16 ENCOUNTER — TELEPHONE (OUTPATIENT)
Dept: FAMILY MEDICINE | Facility: CLINIC | Age: 51
End: 2021-04-16

## 2021-04-16 ENCOUNTER — HOSPITAL ENCOUNTER (OUTPATIENT)
Facility: CLINIC | Age: 51
Discharge: HOME | End: 2021-04-16
Attending: SURGERY | Admitting: SURGERY
Payer: COMMERCIAL

## 2021-04-16 ENCOUNTER — ANESTHESIA (OUTPATIENT)
Dept: GASTROENTEROLOGY | Facility: CLINIC | Age: 51
End: 2021-04-16
Payer: COMMERCIAL

## 2021-04-16 VITALS
OXYGEN SATURATION: 94 % | HEART RATE: 86 BPM | RESPIRATION RATE: 16 BRPM | DIASTOLIC BLOOD PRESSURE: 73 MMHG | TEMPERATURE: 98.3 F | HEIGHT: 71 IN | WEIGHT: 269 LBS | SYSTOLIC BLOOD PRESSURE: 104 MMHG | BODY MASS INDEX: 37.66 KG/M2

## 2021-04-16 LAB — COLONOSCOPY: NORMAL

## 2021-04-16 PROCEDURE — 258N000003 HC RX IP 258 OP 636: Performed by: SURGERY

## 2021-04-16 PROCEDURE — 45378 DIAGNOSTIC COLONOSCOPY: CPT | Performed by: SURGERY

## 2021-04-16 PROCEDURE — 250N000011 HC RX IP 250 OP 636: Performed by: NURSE ANESTHETIST, CERTIFIED REGISTERED

## 2021-04-16 PROCEDURE — G0121 COLON CA SCRN NOT HI RSK IND: HCPCS | Performed by: SURGERY

## 2021-04-16 PROCEDURE — 370N000017 HC ANESTHESIA TECHNICAL FEE, PER MIN: Performed by: SURGERY

## 2021-04-16 PROCEDURE — 250N000009 HC RX 250: Performed by: SURGERY

## 2021-04-16 RX ORDER — ONDANSETRON 2 MG/ML
4 INJECTION INTRAMUSCULAR; INTRAVENOUS
Status: DISCONTINUED | OUTPATIENT
Start: 2021-04-16 | End: 2021-04-16 | Stop reason: HOSPADM

## 2021-04-16 RX ORDER — LIDOCAINE 40 MG/G
CREAM TOPICAL
Status: DISCONTINUED | OUTPATIENT
Start: 2021-04-16 | End: 2021-04-16 | Stop reason: HOSPADM

## 2021-04-16 RX ORDER — PROPOFOL 10 MG/ML
INJECTION, EMULSION INTRAVENOUS PRN
Status: DISCONTINUED | OUTPATIENT
Start: 2021-04-16 | End: 2021-04-16

## 2021-04-16 RX ORDER — SODIUM CHLORIDE, SODIUM LACTATE, POTASSIUM CHLORIDE, CALCIUM CHLORIDE 600; 310; 30; 20 MG/100ML; MG/100ML; MG/100ML; MG/100ML
INJECTION, SOLUTION INTRAVENOUS CONTINUOUS
Status: DISCONTINUED | OUTPATIENT
Start: 2021-04-16 | End: 2021-04-16 | Stop reason: HOSPADM

## 2021-04-16 RX ADMIN — PROPOFOL 150 MG: 10 INJECTION, EMULSION INTRAVENOUS at 07:36

## 2021-04-16 RX ADMIN — SODIUM CHLORIDE, POTASSIUM CHLORIDE, SODIUM LACTATE AND CALCIUM CHLORIDE 30 ML: 600; 310; 30; 20 INJECTION, SOLUTION INTRAVENOUS at 06:49

## 2021-04-16 RX ADMIN — LIDOCAINE HYDROCHLORIDE 1 ML: 10 INJECTION, SOLUTION EPIDURAL; INFILTRATION; INTRACAUDAL; PERINEURAL at 06:50

## 2021-04-16 RX ADMIN — PROPOFOL 150 MG: 10 INJECTION, EMULSION INTRAVENOUS at 07:35

## 2021-04-16 RX ADMIN — PROPOFOL 150 MG: 10 INJECTION, EMULSION INTRAVENOUS at 07:37

## 2021-04-16 ASSESSMENT — MIFFLIN-ST. JEOR: SCORE: 2093.34

## 2021-04-16 NOTE — H&P (VIEW-ONLY)
"50 year old year old male here for colonoscopy for screening.    Patient Active Problem List   Diagnosis     CARDIOVASCULAR SCREENING; LDL GOAL LESS THAN 130     Tinea versicolor     Essential hypertension with goal blood pressure less than 130/80     Chondromalacia of patella, right     Obesity (BMI 35.0-39.9) with comorbidity (H)     Tinea corporis     Dehydration     Mixed hyperlipidemia       Past Medical History:   Diagnosis Date     Hyperlipidemia      Syncope and collapse        Past Surgical History:   Procedure Laterality Date     ARTHROTOMY WRIST Right 6/10/2016    Procedure: ARTHROTOMY WRIST;  Surgeon: Toni Ewing MD;  Location: [facility] OR     BONE MARROW ASPIRATION ONLY         @[facility]@    No current outpatient medications on file.       Allergies   Allergen Reactions     Norco [Hydrocodone-Acetaminophen] Nausea     Simvastatin Other (See Comments)     fasciatus       Pt reports that he has never smoked. He has never used smokeless tobacco. He reports current alcohol use. He reports that he does not use drugs.    Exam:  /85   Temp 98.3  F (36.8  C)   Resp 16   Ht 1.797 m (5' 10.75\")   Wt 122 kg (269 lb)   SpO2 96%   BMI 37.78 kg/m      Awake, Alert OX3  Lungs - CTA bilaterally  CV - RRR, no murmurs, distal pulses intact  Abd - soft, non-distended, non-tender, +BS  Extr - No cyanosis or edema    A/P 50 year old year old male in need of colonoscopy for screening. Risks, benefits, alternatives, and complications were discussed including the possibility of perforation and the patient agreed to proceed    Nito Platt MD   "

## 2021-04-16 NOTE — ANESTHESIA CARE TRANSFER NOTE
Patient: Alen Gibbs    Procedure(s):  COLONOSCOPY    Diagnosis: Colon cancer screening [Z12.11]  Diagnosis Additional Information: No value filed.    Anesthesia Type:   MAC     Note:    Oropharynx: oropharynx clear of all foreign objects  Level of Consciousness: awake  Oxygen Supplementation: room air    Independent Airway: airway patency satisfactory and stable  Dentition: dentition unchanged  Vital Signs Stable: post-procedure vital signs reviewed and stable  Report to RN Given: handoff report given  Patient transferred to: Phase II    Handoff Report: Identifed the Patient, Identified the Reponsible Provider, Reviewed the pertinent medical history, Discussed the surgical course, Reviewed Intra-OP anesthesia mangement and issues during anesthesia, Set expectations for post-procedure period and Allowed opportunity for questions and acknowledgement of understanding      Vitals: (Last set prior to Anesthesia Care Transfer)  CRNA VITALS  4/16/2021 0716 - 4/16/2021 0746      4/16/2021             Pulse:  81    Ht Rate:  80    SpO2:  97 %        Electronically Signed By: MACY Zamora CRNA  April 16, 2021  7:46 AM

## 2021-04-16 NOTE — ANESTHESIA POSTPROCEDURE EVALUATION
Patient: Alen Gibbs    Procedure(s):  COLONOSCOPY    Diagnosis:Colon cancer screening [Z12.11]  Diagnosis Additional Information: No value filed.    Anesthesia Type:  MAC    Note:  Disposition: Outpatient   Postop Pain Control: Uneventful            Sign Out: Well controlled pain   PONV: No   Neuro/Psych: Uneventful            Sign Out: Acceptable/Baseline neuro status   Airway/Respiratory: Uneventful            Sign Out: Acceptable/Baseline resp. status   CV/Hemodynamics: Uneventful            Sign Out: Acceptable CV status   Other NRE: NONE   DID A NON-ROUTINE EVENT OCCUR? No         Last vitals:  Vitals:    04/16/21 0642   BP: 108/85   Resp: 16   Temp: 36.8  C (98.3  F)   SpO2: 96%       Last vitals prior to Anesthesia Care Transfer:  CRNA VITALS  4/16/2021 0716 - 4/16/2021 0748      4/16/2021             Pulse:  81    Ht Rate:  80    SpO2:  97 %          Electronically Signed By: MACY Zamora CRNA  April 16, 2021  7:48 AM

## 2021-04-16 NOTE — BRIEF OP NOTE
[facility]   Brief Operative Note    Pre-operative diagnosis: Colon cancer screening [Z12.11]   Post-operative diagnosis a few diverticuli, otherwise normal     Procedure: Procedure(s):  COLONOSCOPY   Surgeon(s): Surgeon(s) and Role:     * Nito Platt MD - Primary   Estimated blood loss: * No values recorded between 4/16/2021  7:35 AM and 4/16/2021  7:49 AM *    Specimens: * No specimens in log *   Findings: 1. A few sigmoid diverticuli noted  2. Colon otherwise normal

## 2021-04-16 NOTE — H&P
"50 year old year old male here for colonoscopy for screening.    Patient Active Problem List   Diagnosis     CARDIOVASCULAR SCREENING; LDL GOAL LESS THAN 130     Tinea versicolor     Essential hypertension with goal blood pressure less than 130/80     Chondromalacia of patella, right     Obesity (BMI 35.0-39.9) with comorbidity (H)     Tinea corporis     Dehydration     Mixed hyperlipidemia       Past Medical History:   Diagnosis Date     Hyperlipidemia      Syncope and collapse        Past Surgical History:   Procedure Laterality Date     ARTHROTOMY WRIST Right 6/10/2016    Procedure: ARTHROTOMY WRIST;  Surgeon: Toni Ewing MD;  Location: [facility] OR     BONE MARROW ASPIRATION ONLY         @[facility]@    No current outpatient medications on file.       Allergies   Allergen Reactions     Norco [Hydrocodone-Acetaminophen] Nausea     Simvastatin Other (See Comments)     fasciatus       Pt reports that he has never smoked. He has never used smokeless tobacco. He reports current alcohol use. He reports that he does not use drugs.    Exam:  /85   Temp 98.3  F (36.8  C)   Resp 16   Ht 1.797 m (5' 10.75\")   Wt 122 kg (269 lb)   SpO2 96%   BMI 37.78 kg/m      Awake, Alert OX3  Lungs - CTA bilaterally  CV - RRR, no murmurs, distal pulses intact  Abd - soft, non-distended, non-tender, +BS  Extr - No cyanosis or edema    A/P 50 year old year old male in need of colonoscopy for screening. Risks, benefits, alternatives, and complications were discussed including the possibility of perforation and the patient agreed to proceed    Nito Platt MD   "

## 2021-04-21 NOTE — TELEPHONE ENCOUNTER
[employer] workability and FMLA for total knee surgery 5/6/2021 forms completed and routed to Dr. Colmenares for review and signature.

## 2021-04-23 NOTE — TELEPHONE ENCOUNTER
Forms completed, signed, and mailed to patient per patient request.  Copy of form placed in CSS basket and another sent to scan.

## 2021-04-26 DIAGNOSIS — Z11.59 ENCOUNTER FOR SCREENING FOR OTHER VIRAL DISEASES: ICD-10-CM

## 2021-05-02 DIAGNOSIS — Z11.59 ENCOUNTER FOR SCREENING FOR OTHER VIRAL DISEASES: ICD-10-CM

## 2021-05-02 LAB
SARS-COV-2 RNA RESP QL NAA+PROBE: NORMAL
SPECIMEN SOURCE: NORMAL

## 2021-05-02 PROCEDURE — U0005 INFEC AGEN DETEC AMPLI PROBE: HCPCS | Performed by: ORTHOPAEDIC SURGERY

## 2021-05-02 PROCEDURE — U0003 INFECTIOUS AGENT DETECTION BY NUCLEIC ACID (DNA OR RNA); SEVERE ACUTE RESPIRATORY SYNDROME CORONAVIRUS 2 (SARS-COV-2) (CORONAVIRUS DISEASE [COVID-19]), AMPLIFIED PROBE TECHNIQUE, MAKING USE OF HIGH THROUGHPUT TECHNOLOGIES AS DESCRIBED BY CMS-2020-01-R: HCPCS | Performed by: ORTHOPAEDIC SURGERY

## 2021-05-05 ENCOUNTER — ANESTHESIA EVENT (OUTPATIENT)
Dept: SURGERY | Facility: CLINIC | Age: 51
End: 2021-05-05
Payer: COMMERCIAL

## 2021-05-05 NOTE — ANESTHESIA PREPROCEDURE EVALUATION
Anesthesia Pre-Procedure Evaluation    Patient: Alen Gibbs   MRN: [id number] : 1970        Preoperative Diagnosis: Arthritis of right knee [M17.11]   Procedure : Procedure(s):  TOTAL Knee Arthroplasty     Past Medical History:   Diagnosis Date     Hyperlipidemia      Syncope and collapse       Past Surgical History:   Procedure Laterality Date     ARTHROTOMY WRIST Right 6/10/2016    Procedure: ARTHROTOMY WRIST;  Surgeon: Toni Ewing MD;  Location: [facility]     BONE MARROW ASPIRATION ONLY       COLONOSCOPY N/A 2021    Procedure: COLONOSCOPY;  Surgeon: Nito Platt MD;  Location: [facility]      Allergies   Allergen Reactions     Norco [Hydrocodone-Acetaminophen] Nausea     Simvastatin Other (See Comments)     fasciatus      Social History     Tobacco Use     Smoking status: Never Smoker     Smokeless tobacco: Never Used   Substance Use Topics     Alcohol use: Yes     Comment: occ      Wt Readings from Last 1 Encounters:   21 122 kg (269 lb)        Anesthesia Evaluation   Pt has had prior anesthetic. Type: MAC.        ROS/MED HX  ENT/Pulmonary:     (+) TATIANA risk factors, hypertension, obese,     Neurologic:  - neg neurologic ROS     Cardiovascular:     (+) Dyslipidemia hypertension-----fainting (syncope). Previous cardiac testing   Echo: Date: 2020 Results:  Interpretation Summary     There is mild concentric left ventricular hypertrophy.  Left ventricular systolic function is low normal.  There is no thrombus seen in the left ventricle.  A contrast injection (Bubble Study) was performed that was negative for flow  across the interatrial septum.  Mild aortic root dilatation.  _____________________________________________________________________________  __        Left Ventricle  The left ventricle is normal in size. There is mild concentric left  ventricular hypertrophy. Left ventricular systolic function is low normal. The  visual ejection fraction is estimated at 50-55%. Left  ventricular diastolic  function is normal. Normal left ventricular wall motion. There is no thrombus  seen in the left ventricle.     Right Ventricle  The right ventricle is normal in structure, function and size.     Atria  The left atrium is borderline dilated. Right atrial size is normal. A contrast  injection (Bubble Study) was performed that was negative for flow across the  interatrial septum. There is no color Doppler evidence of an atrial shunt.     Mitral Valve  The mitral valve leaflets appear normal. There is no evidence of stenosis,  fluttering, or prolapse. There is trace mitral regurgitation.        Tricuspid Valve  There is trace tricuspid regurgitation. Doppler findings do not suggest  pulmonary hypertension. IVC diameter and respiratory changes fall into an  intermediate range suggesting an RA pressure of 8 mmHg.     Aortic Valve  The aortic valve is normal in structure and function.     Pulmonic Valve  The pulmonic valve is not well seen, but is grossly normal.     Vessels  Mild aortic root dilatation.     Pericardium  The pericardium appears normal.        Rhythm  Sinus rhythm was noted.  Stress Test: Date: Results:    ECG Reviewed: Date: 11/25/19 Results:  Sinus Rhythm   - Nonspecific T-abnormality.     ABNORMAL   Cath:  Date: Results:      METS/Exercise Tolerance:     Hematologic:  - neg hematologic  ROS     Musculoskeletal:  - neg musculoskeletal ROS     GI/Hepatic:  - neg GI/hepatic ROS     Renal/Genitourinary:  - neg Renal ROS     Endo:     (+) Obesity,     Psychiatric/Substance Use:  - neg psychiatric ROS     Infectious Disease:       Malignancy:  - neg malignancy ROS     Other:            Physical Exam    Airway        Mallampati: II   TM distance: > 3 FB   Neck ROM: full   Mouth opening: > 3 cm    Respiratory Devices and Support         Dental  no notable dental history         Cardiovascular   cardiovascular exam normal          Pulmonary   pulmonary exam normal                OUTSIDE  LABS:  CBC:   Lab Results   Component Value Date    WBC 8.0 04/13/2021    WBC 7.1 11/25/2019    HGB 14.5 04/13/2021    HGB 15.4 11/25/2019    HCT 44.2 04/13/2021    HCT 48.0 11/25/2019     04/13/2021     11/25/2019     BMP:   Lab Results   Component Value Date     03/26/2021     11/25/2019    POTASSIUM 4.1 03/26/2021    POTASSIUM 4.1 11/25/2019    CHLORIDE 109 03/26/2021    CHLORIDE 113 (H) 11/25/2019    CO2 27 03/26/2021    CO2 20 11/25/2019    BUN 18 03/26/2021    BUN 15 11/25/2019    CR 1.05 03/26/2021    CR 1.10 11/25/2019    GLC 92 03/26/2021    GLC 88 11/25/2019     COAGS:   Lab Results   Component Value Date    PTT 31 02/22/2019    INR 0.97 02/22/2019     POC:   Lab Results   Component Value Date     (H) 02/22/2019     HEPATIC:   Lab Results   Component Value Date    ALBUMIN 3.5 11/25/2019    PROTTOTAL 7.2 11/25/2019    ALT 52 11/25/2019    AST 26 11/25/2019    ALKPHOS 67 11/25/2019    BILITOTAL 0.3 11/25/2019     OTHER:   Lab Results   Component Value Date    CHRISTINA 8.9 03/26/2021    LIPASE 71 (L) 04/29/2019    TSH 0.98 06/07/2019    T4 1.06 06/07/2019    CRP 19.9 (H) 07/27/2018    SED 10 10/06/2009       Anesthesia Plan    ASA Status:  2      Anesthesia Type: Spinal.   Induction: Intravenous.           Consents    Anesthesia Plan(s) and associated risks, benefits, and realistic alternatives discussed. Questions answered and patient/representative(s) expressed understanding.     - Discussed with:  Patient         Postoperative Care    Pain management: IV analgesics, Oral pain medications, Peripheral nerve block (Single Shot).   PONV prophylaxis: Ondansetron (or other 5HT-3), Dexamethasone or Solumedrol     Comments:                MACY Martins CRNA

## 2021-05-06 ENCOUNTER — ANESTHESIA (OUTPATIENT)
Dept: SURGERY | Facility: CLINIC | Age: 51
End: 2021-05-06
Payer: COMMERCIAL

## 2021-05-06 ENCOUNTER — HOSPITAL ENCOUNTER (OUTPATIENT)
Facility: CLINIC | Age: 51
Discharge: HOME | End: 2021-05-07
Attending: ORTHOPAEDIC SURGERY | Admitting: ORTHOPAEDIC SURGERY
Payer: COMMERCIAL

## 2021-05-06 ENCOUNTER — APPOINTMENT (OUTPATIENT)
Dept: GENERAL RADIOLOGY | Facility: CLINIC | Age: 51
End: 2021-05-06
Attending: ORTHOPAEDIC SURGERY
Payer: COMMERCIAL

## 2021-05-06 DIAGNOSIS — Z96.651 STATUS POST RIGHT KNEE REPLACEMENT: Primary | ICD-10-CM

## 2021-05-06 PROBLEM — E66.01 MORBID OBESITY (H): Chronic | Status: ACTIVE | Noted: 2019-03-01

## 2021-05-06 PROBLEM — M10.9 GOUT: Status: ACTIVE | Noted: 2021-05-06

## 2021-05-06 PROBLEM — M17.11 RIGHT KNEE DJD: Status: ACTIVE | Noted: 2021-05-06

## 2021-05-06 PROBLEM — Z20.822 COVID-19 RULED OUT: Status: ACTIVE | Noted: 2021-05-06

## 2021-05-06 PROBLEM — M10.9 GOUT: Chronic | Status: ACTIVE | Noted: 2021-05-06

## 2021-05-06 PROBLEM — Z96.659 STATUS POST TOTAL KNEE REPLACEMENT: Status: ACTIVE | Noted: 2021-05-06

## 2021-05-06 PROCEDURE — 250N000009 HC RX 250: Performed by: NURSE ANESTHETIST, CERTIFIED REGISTERED

## 2021-05-06 PROCEDURE — 272N000001 HC OR GENERAL SUPPLY STERILE: Performed by: ORTHOPAEDIC SURGERY

## 2021-05-06 PROCEDURE — 250N000011 HC RX IP 250 OP 636: Performed by: PHYSICIAN ASSISTANT

## 2021-05-06 PROCEDURE — 999N000141 HC STATISTIC PRE-PROCEDURE NURSING ASSESSMENT: Performed by: ORTHOPAEDIC SURGERY

## 2021-05-06 PROCEDURE — 250N000013 HC RX MED GY IP 250 OP 250 PS 637: Performed by: NURSE ANESTHETIST, CERTIFIED REGISTERED

## 2021-05-06 PROCEDURE — 258N000003 HC RX IP 258 OP 636: Performed by: NURSE ANESTHETIST, CERTIFIED REGISTERED

## 2021-05-06 PROCEDURE — 360N000077 HC SURGERY LEVEL 4, PER MIN: Performed by: ORTHOPAEDIC SURGERY

## 2021-05-06 PROCEDURE — 250N000013 HC RX MED GY IP 250 OP 250 PS 637: Performed by: ORTHOPAEDIC SURGERY

## 2021-05-06 PROCEDURE — 271N000001 HC OR GENERAL SUPPLY NON-STERILE: Performed by: ORTHOPAEDIC SURGERY

## 2021-05-06 PROCEDURE — 710N000010 HC RECOVERY PHASE 1, LEVEL 2, PER MIN: Performed by: ORTHOPAEDIC SURGERY

## 2021-05-06 PROCEDURE — 250N000011 HC RX IP 250 OP 636

## 2021-05-06 PROCEDURE — 258N000001 HC RX 258: Performed by: ORTHOPAEDIC SURGERY

## 2021-05-06 PROCEDURE — 250N000011 HC RX IP 250 OP 636: Performed by: NURSE ANESTHETIST, CERTIFIED REGISTERED

## 2021-05-06 PROCEDURE — 370N000017 HC ANESTHESIA TECHNICAL FEE, PER MIN: Performed by: ORTHOPAEDIC SURGERY

## 2021-05-06 PROCEDURE — 250N000011 HC RX IP 250 OP 636: Performed by: ORTHOPAEDIC SURGERY

## 2021-05-06 PROCEDURE — C1776 JOINT DEVICE (IMPLANTABLE): HCPCS | Performed by: ORTHOPAEDIC SURGERY

## 2021-05-06 PROCEDURE — 250N000009 HC RX 250: Performed by: ORTHOPAEDIC SURGERY

## 2021-05-06 PROCEDURE — 278N000051 HC OR IMPLANT GENERAL: Performed by: ORTHOPAEDIC SURGERY

## 2021-05-06 PROCEDURE — 999N000065 XR KNEE PORT RIGHT 1/2 VIEWS: Mod: RT

## 2021-05-06 PROCEDURE — 250N000013 HC RX MED GY IP 250 OP 250 PS 637: Performed by: PHYSICIAN ASSISTANT

## 2021-05-06 PROCEDURE — 250N000009 HC RX 250

## 2021-05-06 DEVICE — IMPLANTABLE DEVICE: Type: IMPLANTABLE DEVICE | Site: KNEE | Status: FUNCTIONAL

## 2021-05-06 DEVICE — IMP TIB BASE JJ ATTUNE FX BR SYS CEM SZ8 150670008: Type: IMPLANTABLE DEVICE | Site: KNEE | Status: FUNCTIONAL

## 2021-05-06 DEVICE — BONE CEMENT RADIOPAQUE SIMPLEX HV FULL DOSE 6194-1-001: Type: IMPLANTABLE DEVICE | Site: KNEE | Status: FUNCTIONAL

## 2021-05-06 RX ORDER — OXYCODONE HYDROCHLORIDE 5 MG/1
5 TABLET ORAL EVERY 4 HOURS PRN
Status: DISCONTINUED | OUTPATIENT
Start: 2021-05-06 | End: 2021-05-07

## 2021-05-06 RX ORDER — NALOXONE HYDROCHLORIDE 0.4 MG/ML
0.2 INJECTION, SOLUTION INTRAMUSCULAR; INTRAVENOUS; SUBCUTANEOUS
Status: DISCONTINUED | OUTPATIENT
Start: 2021-05-06 | End: 2021-05-07 | Stop reason: HOSPADM

## 2021-05-06 RX ORDER — ROPIVACAINE HYDROCHLORIDE 5 MG/ML
INJECTION, SOLUTION EPIDURAL; INFILTRATION; PERINEURAL PRN
Status: DISCONTINUED | OUTPATIENT
Start: 2021-05-06 | End: 2021-05-06

## 2021-05-06 RX ORDER — DOCUSATE SODIUM 100 MG/1
100 CAPSULE, LIQUID FILLED ORAL 2 TIMES DAILY
Status: DISCONTINUED | OUTPATIENT
Start: 2021-05-06 | End: 2021-05-07 | Stop reason: HOSPADM

## 2021-05-06 RX ORDER — CEFAZOLIN SODIUM 1 G/50ML
1 INJECTION, SOLUTION INTRAVENOUS SEE ADMIN INSTRUCTIONS
Status: DISCONTINUED | OUTPATIENT
Start: 2021-05-06 | End: 2021-05-06 | Stop reason: HOSPADM

## 2021-05-06 RX ORDER — OXYCODONE HYDROCHLORIDE 5 MG/1
5-10 TABLET ORAL
Qty: 40 TABLET | Refills: 0 | Status: SHIPPED | OUTPATIENT
Start: 2021-05-06 | End: 2021-05-07

## 2021-05-06 RX ORDER — SODIUM CHLORIDE, SODIUM LACTATE, POTASSIUM CHLORIDE, CALCIUM CHLORIDE 600; 310; 30; 20 MG/100ML; MG/100ML; MG/100ML; MG/100ML
INJECTION, SOLUTION INTRAVENOUS CONTINUOUS
Status: DISCONTINUED | OUTPATIENT
Start: 2021-05-06 | End: 2021-05-07 | Stop reason: HOSPADM

## 2021-05-06 RX ORDER — GABAPENTIN 300 MG/1
300 CAPSULE ORAL ONCE
Status: COMPLETED | OUTPATIENT
Start: 2021-05-06 | End: 2021-05-06

## 2021-05-06 RX ORDER — FENTANYL CITRATE 50 UG/ML
INJECTION, SOLUTION INTRAMUSCULAR; INTRAVENOUS PRN
Status: DISCONTINUED | OUTPATIENT
Start: 2021-05-06 | End: 2021-05-06

## 2021-05-06 RX ORDER — ONDANSETRON 4 MG/1
4 TABLET, ORALLY DISINTEGRATING ORAL EVERY 6 HOURS PRN
Status: DISCONTINUED | OUTPATIENT
Start: 2021-05-06 | End: 2021-05-07 | Stop reason: HOSPADM

## 2021-05-06 RX ORDER — LIDOCAINE HCL/EPINEPHRINE/PF 2%-1:200K
VIAL (ML) INJECTION PRN
Status: DISCONTINUED | OUTPATIENT
Start: 2021-05-06 | End: 2021-05-06

## 2021-05-06 RX ORDER — OXYCODONE HYDROCHLORIDE 5 MG/1
10 TABLET ORAL EVERY 4 HOURS PRN
Status: DISCONTINUED | OUTPATIENT
Start: 2021-05-06 | End: 2021-05-07

## 2021-05-06 RX ORDER — IBUPROFEN 600 MG/1
600 TABLET, FILM COATED ORAL EVERY 6 HOURS PRN
Qty: 30 TABLET | Refills: 0
Start: 2021-05-06 | End: 2022-05-20

## 2021-05-06 RX ORDER — CEFAZOLIN SODIUM IN 0.9 % NACL 3 G/100 ML
3 INTRAVENOUS SOLUTION, PIGGYBACK (ML) INTRAVENOUS
Status: COMPLETED | OUTPATIENT
Start: 2021-05-06 | End: 2021-05-06

## 2021-05-06 RX ORDER — AMOXICILLIN 250 MG
1 CAPSULE ORAL 2 TIMES DAILY
Status: DISCONTINUED | OUTPATIENT
Start: 2021-05-06 | End: 2021-05-07 | Stop reason: HOSPADM

## 2021-05-06 RX ORDER — FENTANYL CITRATE 50 UG/ML
25-50 INJECTION, SOLUTION INTRAMUSCULAR; INTRAVENOUS
Status: DISCONTINUED | OUTPATIENT
Start: 2021-05-06 | End: 2021-05-06 | Stop reason: HOSPADM

## 2021-05-06 RX ORDER — DEXAMETHASONE SODIUM PHOSPHATE 4 MG/ML
INJECTION, SOLUTION INTRA-ARTICULAR; INTRALESIONAL; INTRAMUSCULAR; INTRAVENOUS; SOFT TISSUE PRN
Status: DISCONTINUED | OUTPATIENT
Start: 2021-05-06 | End: 2021-05-06

## 2021-05-06 RX ORDER — MAGNESIUM SULFATE HEPTAHYDRATE 40 MG/ML
2 INJECTION, SOLUTION INTRAVENOUS ONCE
Status: COMPLETED | OUTPATIENT
Start: 2021-05-06 | End: 2021-05-06

## 2021-05-06 RX ORDER — ONDANSETRON 2 MG/ML
4 INJECTION INTRAMUSCULAR; INTRAVENOUS EVERY 6 HOURS PRN
Status: DISCONTINUED | OUTPATIENT
Start: 2021-05-06 | End: 2021-05-07 | Stop reason: HOSPADM

## 2021-05-06 RX ORDER — ONDANSETRON 4 MG/1
4 TABLET, ORALLY DISINTEGRATING ORAL EVERY 30 MIN PRN
Status: DISCONTINUED | OUTPATIENT
Start: 2021-05-06 | End: 2021-05-06 | Stop reason: HOSPADM

## 2021-05-06 RX ORDER — CEFAZOLIN SODIUM 2 G/100ML
2 INJECTION, SOLUTION INTRAVENOUS EVERY 8 HOURS
Status: COMPLETED | OUTPATIENT
Start: 2021-05-06 | End: 2021-05-07

## 2021-05-06 RX ORDER — HYDROMORPHONE HCL IN WATER/PF 6 MG/30 ML
0.2 PATIENT CONTROLLED ANALGESIA SYRINGE INTRAVENOUS
Status: DISCONTINUED | OUTPATIENT
Start: 2021-05-06 | End: 2021-05-07 | Stop reason: HOSPADM

## 2021-05-06 RX ORDER — ACETAMINOPHEN 325 MG/1
650 TABLET ORAL EVERY 4 HOURS PRN
Status: DISCONTINUED | OUTPATIENT
Start: 2021-05-09 | End: 2021-05-07 | Stop reason: HOSPADM

## 2021-05-06 RX ORDER — HYDROXYZINE HYDROCHLORIDE 25 MG/1
25-50 TABLET, FILM COATED ORAL EVERY 6 HOURS PRN
Status: DISCONTINUED | OUTPATIENT
Start: 2021-05-06 | End: 2021-05-07 | Stop reason: HOSPADM

## 2021-05-06 RX ORDER — SODIUM CHLORIDE, SODIUM LACTATE, POTASSIUM CHLORIDE, CALCIUM CHLORIDE 600; 310; 30; 20 MG/100ML; MG/100ML; MG/100ML; MG/100ML
INJECTION, SOLUTION INTRAVENOUS CONTINUOUS
Status: DISCONTINUED | OUTPATIENT
Start: 2021-05-06 | End: 2021-05-06 | Stop reason: HOSPADM

## 2021-05-06 RX ORDER — KETAMINE HYDROCHLORIDE 10 MG/ML
INJECTION, SOLUTION INTRAMUSCULAR; INTRAVENOUS PRN
Status: DISCONTINUED | OUTPATIENT
Start: 2021-05-06 | End: 2021-05-06

## 2021-05-06 RX ORDER — LIDOCAINE 40 MG/G
CREAM TOPICAL
Status: DISCONTINUED | OUTPATIENT
Start: 2021-05-06 | End: 2021-05-06 | Stop reason: HOSPADM

## 2021-05-06 RX ORDER — LIDOCAINE 40 MG/G
CREAM TOPICAL
Status: DISCONTINUED | OUTPATIENT
Start: 2021-05-06 | End: 2021-05-07 | Stop reason: HOSPADM

## 2021-05-06 RX ORDER — TRANEXAMIC ACID 650 MG/1
1950 TABLET ORAL ONCE
Status: COMPLETED | OUTPATIENT
Start: 2021-05-06 | End: 2021-05-06

## 2021-05-06 RX ORDER — ONDANSETRON 2 MG/ML
4 INJECTION INTRAMUSCULAR; INTRAVENOUS EVERY 30 MIN PRN
Status: DISCONTINUED | OUTPATIENT
Start: 2021-05-06 | End: 2021-05-06 | Stop reason: HOSPADM

## 2021-05-06 RX ORDER — ACETAMINOPHEN 325 MG/1
975 TABLET ORAL EVERY 8 HOURS
Status: DISCONTINUED | OUTPATIENT
Start: 2021-05-06 | End: 2021-05-07 | Stop reason: HOSPADM

## 2021-05-06 RX ORDER — PROPOFOL 10 MG/ML
INJECTION, EMULSION INTRAVENOUS CONTINUOUS PRN
Status: DISCONTINUED | OUTPATIENT
Start: 2021-05-06 | End: 2021-05-06

## 2021-05-06 RX ORDER — AMOXICILLIN 250 MG
1-2 CAPSULE ORAL DAILY PRN
Qty: 15 TABLET | Refills: 0 | Status: SHIPPED | OUTPATIENT
Start: 2021-05-06 | End: 2021-07-12

## 2021-05-06 RX ORDER — NALOXONE HYDROCHLORIDE 0.4 MG/ML
0.4 INJECTION, SOLUTION INTRAMUSCULAR; INTRAVENOUS; SUBCUTANEOUS
Status: DISCONTINUED | OUTPATIENT
Start: 2021-05-06 | End: 2021-05-07 | Stop reason: HOSPADM

## 2021-05-06 RX ORDER — POLYETHYLENE GLYCOL 3350 17 G/17G
17 POWDER, FOR SOLUTION ORAL DAILY
Status: DISCONTINUED | OUTPATIENT
Start: 2021-05-07 | End: 2021-05-07 | Stop reason: HOSPADM

## 2021-05-06 RX ORDER — BUPIVACAINE HYDROCHLORIDE 7.5 MG/ML
INJECTION, SOLUTION INTRASPINAL PRN
Status: DISCONTINUED | OUTPATIENT
Start: 2021-05-06 | End: 2021-05-06

## 2021-05-06 RX ORDER — ALLOPURINOL 300 MG/1
300 TABLET ORAL DAILY
Status: DISCONTINUED | OUTPATIENT
Start: 2021-05-07 | End: 2021-05-07 | Stop reason: HOSPADM

## 2021-05-06 RX ORDER — PROCHLORPERAZINE MALEATE 5 MG
10 TABLET ORAL EVERY 6 HOURS PRN
Status: DISCONTINUED | OUTPATIENT
Start: 2021-05-06 | End: 2021-05-07 | Stop reason: HOSPADM

## 2021-05-06 RX ORDER — ACETAMINOPHEN 325 MG/1
650 TABLET ORAL EVERY 4 HOURS PRN
Qty: 100 TABLET | Refills: 0
Start: 2021-05-06 | End: 2022-05-20

## 2021-05-06 RX ORDER — LISINOPRIL 10 MG/1
10 TABLET ORAL DAILY
Status: DISCONTINUED | OUTPATIENT
Start: 2021-05-07 | End: 2021-05-07 | Stop reason: HOSPADM

## 2021-05-06 RX ORDER — HYDROMORPHONE HYDROCHLORIDE 1 MG/ML
0.4 INJECTION, SOLUTION INTRAMUSCULAR; INTRAVENOUS; SUBCUTANEOUS
Status: DISCONTINUED | OUTPATIENT
Start: 2021-05-06 | End: 2021-05-07 | Stop reason: HOSPADM

## 2021-05-06 RX ORDER — HYDROXYZINE HYDROCHLORIDE 25 MG/1
25-50 TABLET, FILM COATED ORAL EVERY 6 HOURS PRN
Qty: 40 TABLET | Refills: 0 | Status: SHIPPED | OUTPATIENT
Start: 2021-05-06 | End: 2021-07-12

## 2021-05-06 RX ORDER — ACETAMINOPHEN 325 MG/1
975 TABLET ORAL ONCE
Status: COMPLETED | OUTPATIENT
Start: 2021-05-06 | End: 2021-05-06

## 2021-05-06 RX ORDER — BISACODYL 10 MG
10 SUPPOSITORY, RECTAL RECTAL DAILY PRN
Status: DISCONTINUED | OUTPATIENT
Start: 2021-05-06 | End: 2021-05-07 | Stop reason: HOSPADM

## 2021-05-06 RX ORDER — LIDOCAINE HYDROCHLORIDE 10 MG/ML
INJECTION, SOLUTION INFILTRATION; PERINEURAL PRN
Status: DISCONTINUED | OUTPATIENT
Start: 2021-05-06 | End: 2021-05-06

## 2021-05-06 RX ADMIN — LIDOCAINE HYDROCHLORIDE 30 MG: 10 INJECTION, SOLUTION INFILTRATION; PERINEURAL at 12:34

## 2021-05-06 RX ADMIN — ACETAMINOPHEN 975 MG: 325 TABLET, FILM COATED ORAL at 11:06

## 2021-05-06 RX ADMIN — MIDAZOLAM 2 MG: 1 INJECTION INTRAMUSCULAR; INTRAVENOUS at 12:29

## 2021-05-06 RX ADMIN — CEFAZOLIN SODIUM 2 G: 2 INJECTION, SOLUTION INTRAVENOUS at 19:49

## 2021-05-06 RX ADMIN — LIDOCAINE HYDROCHLORIDE 0.1 ML: 10 INJECTION, SOLUTION EPIDURAL; INFILTRATION; INTRACAUDAL; PERINEURAL at 11:54

## 2021-05-06 RX ADMIN — DOCUSATE SODIUM AND SENNOSIDES 1 TABLET: 8.6; 5 TABLET ORAL at 19:44

## 2021-05-06 RX ADMIN — KETAMINE HYDROCHLORIDE 30 MG: 10 INJECTION INTRAMUSCULAR; INTRAVENOUS at 12:48

## 2021-05-06 RX ADMIN — OXYCODONE HYDROCHLORIDE 10 MG: 5 TABLET ORAL at 22:49

## 2021-05-06 RX ADMIN — ONDANSETRON 4 MG: 2 INJECTION INTRAMUSCULAR; INTRAVENOUS at 17:22

## 2021-05-06 RX ADMIN — SODIUM CHLORIDE, POTASSIUM CHLORIDE, SODIUM LACTATE AND CALCIUM CHLORIDE 1000 ML: 600; 310; 30; 20 INJECTION, SOLUTION INTRAVENOUS at 11:53

## 2021-05-06 RX ADMIN — MIDAZOLAM 2 MG: 1 INJECTION INTRAMUSCULAR; INTRAVENOUS at 12:36

## 2021-05-06 RX ADMIN — OXYCODONE HYDROCHLORIDE 10 MG: 5 TABLET ORAL at 15:47

## 2021-05-06 RX ADMIN — BUPIVACAINE HYDROCHLORIDE IN DEXTROSE 1.8 ML: 7.5 INJECTION, SOLUTION SUBARACHNOID at 12:41

## 2021-05-06 RX ADMIN — FENTANYL CITRATE 100 MCG: 50 INJECTION, SOLUTION INTRAMUSCULAR; INTRAVENOUS at 12:24

## 2021-05-06 RX ADMIN — KETAMINE HYDROCHLORIDE 20 MG: 10 INJECTION INTRAMUSCULAR; INTRAVENOUS at 13:17

## 2021-05-06 RX ADMIN — HYDROMORPHONE HYDROCHLORIDE 0.4 MG: 1 INJECTION, SOLUTION INTRAMUSCULAR; INTRAVENOUS; SUBCUTANEOUS at 17:13

## 2021-05-06 RX ADMIN — Medication 3 G: at 12:28

## 2021-05-06 RX ADMIN — LIDOCAINE HYDROCHLORIDE,EPINEPHRINE BITARTRATE 5 ML: 20; .005 INJECTION, SOLUTION EPIDURAL; INFILTRATION; INTRACAUDAL; PERINEURAL at 14:37

## 2021-05-06 RX ADMIN — ACETAMINOPHEN 975 MG: 325 TABLET, FILM COATED ORAL at 19:44

## 2021-05-06 RX ADMIN — MIDAZOLAM 2 MG: 1 INJECTION INTRAMUSCULAR; INTRAVENOUS at 12:24

## 2021-05-06 RX ADMIN — PROPOFOL 50 MCG/KG/MIN: 10 INJECTION, EMULSION INTRAVENOUS at 12:24

## 2021-05-06 RX ADMIN — GABAPENTIN 300 MG: 300 CAPSULE ORAL at 11:06

## 2021-05-06 RX ADMIN — MAGNESIUM SULFATE 2 G: 2 INJECTION INTRAVENOUS at 11:59

## 2021-05-06 RX ADMIN — DEXAMETHASONE SODIUM PHOSPHATE 10 MG: 4 INJECTION, SOLUTION INTRA-ARTICULAR; INTRALESIONAL; INTRAMUSCULAR; INTRAVENOUS; SOFT TISSUE at 12:26

## 2021-05-06 RX ADMIN — LIDOCAINE HYDROCHLORIDE 50 MG: 10 INJECTION, SOLUTION INFILTRATION; PERINEURAL at 12:38

## 2021-05-06 RX ADMIN — HYDROXYZINE HYDROCHLORIDE 50 MG: 50 TABLET, FILM COATED ORAL at 15:47

## 2021-05-06 RX ADMIN — ROPIVACAINE HYDROCHLORIDE 20 ML: 5 INJECTION, SOLUTION EPIDURAL; INFILTRATION; PERINEURAL at 14:39

## 2021-05-06 RX ADMIN — SODIUM CHLORIDE, POTASSIUM CHLORIDE, SODIUM LACTATE AND CALCIUM CHLORIDE: 600; 310; 30; 20 INJECTION, SOLUTION INTRAVENOUS at 14:10

## 2021-05-06 RX ADMIN — ASPIRIN 325 MG: 325 TABLET ORAL at 17:15

## 2021-05-06 RX ADMIN — TRANEXAMIC ACID 1950 MG: 650 TABLET ORAL at 11:06

## 2021-05-06 RX ADMIN — HYDROMORPHONE HYDROCHLORIDE 0.4 MG: 1 INJECTION, SOLUTION INTRAMUSCULAR; INTRAVENOUS; SUBCUTANEOUS at 19:49

## 2021-05-06 ASSESSMENT — MIFFLIN-ST. JEOR: SCORE: 2093.34

## 2021-05-06 NOTE — ANESTHESIA CARE TRANSFER NOTE
Patient: Alen Gibbs    Procedure(s):  Total Knee Arthroplasty Right    Diagnosis: Arthritis of right knee [M17.11]  Diagnosis Additional Information: No value filed.    Anesthesia Type:   Spinal     Note:    Oropharynx: oropharynx clear of all foreign objects and spontaneously breathing  Level of Consciousness: awake  Oxygen Supplementation: room air    Independent Airway: airway patency satisfactory and stable  Dentition: dentition unchanged  Vital Signs Stable: post-procedure vital signs reviewed and stable  Report to RN Given: handoff report given  Patient transferred to: PACU    Handoff Report: Identifed the Patient, Identified the Reponsible Provider, Reviewed the pertinent medical history, Discussed the surgical course, Reviewed Intra-OP anesthesia mangement and issues during anesthesia, Set expectations for post-procedure period and Allowed opportunity for questions and acknowledgement of understanding      Vitals: (Last set prior to Anesthesia Care Transfer)  CRNA VITALS  5/6/2021 1355 - 5/6/2021 1446      5/6/2021             Pulse:  69    SpO2:  93 %    Resp Rate (observed):  (!) 2        Electronically Signed By: Loki Flores  May 6, 2021  2:46 PM

## 2021-05-06 NOTE — PLAN OF CARE
Pt up to chair with assist, gait belt and walker.  Some nausea, relieved with zofran, able to tolerate regular diet.

## 2021-05-06 NOTE — PROCEDURES
05/06/21    PREOPERATIVE DIAGNOSES: Primary right knee degenerative joint disease.   POSTOPERATIVE DIAGNOSIS: Primary right knee degenerative joint disease.   PROCEDURE: Right total knee arthroplasty.   SURGEON: Juan Manuel aLndin MD   ASSISTANT: Livier Medina PA-C The presence of the PA was necessary for safe progression of the case.   ANESTHESIA: Spinal with MAC.   ESTIMATED BLOOD LOSS: 50 mL.   TOURNIQUET TIME: 50 minutes at 250 mmHg.   COMPLICATIONS: None apparent.   DISPOSITION: Stable to PACU.    IMPLANTS USED: DePuy Attune size 8 posterior stabilized femoral component with a size 8 S+ tibial component, size 8 by 10mm posterior stabilized polyethylene and 38 mm patella.     INDICATIONS: Alen is a 50 year old male with a history of progressive right knee pain due to end stage arthritis. Unfortunately, he failed conservative management including therapy and injections. After discussing risks, benefits and surgery, he elected to proceed with a right total knee replacement understanding the risks of infection, damage to vessels and nerves, blood clots, stiffness, ongoing pain, need for revision surgery, need for transfusion.     PROCEDURE: Alen was brought to the preoperative holding area where the right knee was marked. Consent was reviewed. He then was transferred to the operating theater. After induction of successful spinal anesthesia, he was placed supine with a bump under the right hip.  A timeout was performed, verifying correct patient, surgery, location. He received preoperative antibiotics as well as transexemic acid. The right lower extremity was then prepped and draped in standard sterile fashion.     We exsanguinated the leg and inflated the tourniquet. We then made a longitudinal incision over the anterior aspect of the knee. Dissection was carried down through skin and subcutaneous tissue. A medial parapatellar arthrotomy was made, exposing end stage medial compartment arthritis.  Synovial tissue from  the suprapatellar pouch excised. The anterior horn of the medial meniscus was released and a medial release was performed. Then, the remainder of the fat pad was excised. We turned our attention to the patella. Using a free hand technique, this was resected down to 12 mm and sized to a 38mm, then punched and a metal protector plate was placed. We then turned our attention to the femur. Preoperatively, there was a 2 degree flexion contracture.  Therefore, using an intramedullary alignment guide, 10 mm of distal femur was resected in 5 degrees of valgus.     We then turned our attention to the tibia.  An extramedullary alignment cut was used to resect 0mm off the low medial side, perpendicular to the mechanical axis.  We then turned our attention back to the distal femur and sized it to a size 8.  The epicondylar axis was established and we placed our 4-in-1 cutting block perpendicular to it, in 1 degrees of external rotation. With this in place, our anterior, posterior and chamfer distal femur cuts were made.  We then used a laminar  to open the joint in order to remove medial and lateral meniscus remnants as well as posterior osteophytes.  The jig was utilized to cut the distal femoral box for the PS component.  A variety of polyethylene were trialed, and we felt a 10mm was best, with range of motion from 0 to 130, stability to varus and valgus stress, normal POLO test, and the patella tracked well.  After this, sized our tibial component to a 8.  We then drilled and punched our tibial component, lining it with the medial 1/3 of the tibial tubercle. The knee was thoroughly irrigated using pulse lavage. The final components were then cemented in place. All excess cement was removed and the knee was placed into full extension while the cement was curing. Also, the wound was instilled with dilute Betadine solution. Once the cement had cured, we retrialed our components with a 10mm poly with findings as above.  We then placed the final poly.  The tourniquet was deflated with hemostasis achieved.  The capsular layer was closed with #1 Stratafix, at which point there was 125 degrees of flexion with gravity.  Subcutaneous tissues with 2-0 Vicryl, skin with a 3-0 Monocryl. A sterile dressing was applied and the patient was awoken from anesthesia and transferred to PACU in stable condition.     POSTOPERATIVE PLAN:   1. Weightbearing as tolerated right lower extremity with PT for mobilization.   2. Deep venous thrombosis prophylaxis: Aspirin 325mg daily x 30 days   3. Perioperative antibiotics.   4. Follow up in 2 weeks for wound check.     FLOR GUY MD

## 2021-05-06 NOTE — ANESTHESIA PROCEDURE NOTES
Intrathecal injection Procedure Note  Pre-Procedure   Staff -        CRNA: Madyson Solis APRN CRNA       Performed By: CRNA       Location: OR       Procedure Start/Stop Times: 5/6/2021 12:30 PM and 5/6/2021 12:41 PM       Pre-Anesthestic Checklist: patient identified, IV checked, risks and benefits discussed, informed consent, monitors and equipment checked, pre-op evaluation, at physician/surgeon's request and post-op pain management  Timeout:       Correct Patient: Yes        Correct Procedure: Yes        Correct Site: Yes        Correct Position: Yes   Procedure Documentation  Procedure: intrathecal injection       Patient Position: sitting       Patient Prep/Sterile Barriers: sterile gloves, mask, patient draped       Skin prep: Betadine (midline approach).       Needle Gauge: 25.        Needle Length (Inches): 3.5        Spinal Needle Type: Pencan       Introducer used      # of attempts: 1 and  # of redirects:     Assessment/Narrative         Paresthesias: No.       CSF fluid: clear.    Medication(s) Administered   Medication Administration Time: 5/6/2021 12:41 PM

## 2021-05-06 NOTE — ANESTHESIA PROCEDURE NOTES
Adductor canal Procedure Note  Pre-Procedure   Staff -        Performed By: SRNA and with CRNAs       Procedure performed by resident/CRNA in presence of a teaching physician.         Location: post-op       Procedure Start/Stop Times: 5/6/2021 2:30 PM and 5/6/2021 2:46 PM       Pre-Anesthestic Checklist: patient identified, IV checked, site marked, risks and benefits discussed, informed consent, monitors and equipment checked, pre-op evaluation and post-op pain management  Timeout:       Correct Patient: Yes        Correct Procedure: Yes        Correct Site: Yes        Correct Position: Yes        Correct Laterality: Yes        Site Marked: Yes  Procedure Documentation  Procedure: Adductor canal       Diagnosis: PAIN       Laterality: right       Patient Prep/Sterile Barriers: sterile gloves, mask, patient draped       Skin prep: Chloraprep       Needle Type: insulated       Needle Gauge: 22.        Needle Length (Inches): 4        - Ultrasound guided       - Ultrasound used to identify targeted nerve, plexus, vascular marker, or fascial plane and place a needle adjacent to it in real-time       - Ultrasound was used to visualize the spread of anesthetic in close proximity to the above referenced structure       - A permanent image is entered into the patient's record.       - The nerve(s) appeared anatomically normal       - There were no apparent abnormal pathologic findings    Assessment/Narrative         The placement was negative for: blood aspirated, painful injection and site bleeding       Paresthesias: No.     Test dose of 5 mL lidocaine 1.5% w/ 1:200,000 epinephrine at.        .      Bolus given via needle. No blood aspirated via catheter.        Secured via.        Insertion/Infusion Method: Single Shot       Complications: none       Injection made incrementally with aspirations every 5 mL.

## 2021-05-06 NOTE — PLAN OF CARE
"[facility] ADMISSION NOTE    Patient admitted to room 2311 at approximately 1545 via cart from surgery. Patient was accompanied by transport tech.     Verbal SBAR report received from Meka prior to patient arrival.     Patient trasferred to bed via air uma. Patient alert and oriented X 3. The patient is not having any pain.  . Admission vital signs: Blood pressure 124/84, pulse 65, temperature 97.4  F (36.3  C), temperature source Oral, resp. rate 14, height 1.797 m (5' 10.75\"), weight 122 kg (269 lb), SpO2 93 %. Patient was oriented to plan of care, call light, bed controls, tv, telephone, and bathroom.     Risk Assessment    The following safety risks were identified during admission: fall. Yellow risk band applied: YES.     Skin Initial Assessment    This writer admitted this patient and completed a full skin assessment and Tayo score in the Adult PCS flowsheet. Appropriate interventions initiated as needed.     Secondary skin check completed by: pt declined.    Tayo Risk Assessment  Sensory Perception: 4-->no impairment  Moisture: 4-->rarely moist  Activity: 4-->walks frequently  Mobility: 4-->no limitation  Nutrition: 4-->excellent  Friction and Shear: 3-->no apparent problem  Tayo Score: 23  Mattress: [facility] Mattress (Foam)    Education    Patient has a [place] to Observation order: No  Observation education completed and documented: N/A      Lilia Begum RN      "

## 2021-05-06 NOTE — ANESTHESIA POSTPROCEDURE EVALUATION
Patient: Alen Gibbs    Procedure(s):  Total Knee Arthroplasty Right    Diagnosis:Arthritis of right knee [M17.11]  Diagnosis Additional Information: No value filed.    Anesthesia Type:  Spinal    Note:  Disposition: Admission   Postop Pain Control: Uneventful            Sign Out: Well controlled pain   PONV: No   Neuro/Psych: Uneventful            Sign Out: Acceptable/Baseline neuro status   Airway/Respiratory: Uneventful            Sign Out: Acceptable/Baseline resp. status   CV/Hemodynamics: Uneventful            Sign Out: Acceptable CV status; No obvious hypovolemia; No obvious fluid overload   Other NRE: NONE   DID A NON-ROUTINE EVENT OCCUR? No           Last vitals:  Vitals:    05/06/21 1058 05/06/21 1426 05/06/21 1430   BP: 122/88 109/73 103/69   Pulse:  70 69   Resp: 16 16 11   Temp: 36.6  C (97.9  F) 37.2  C (98.9  F)    SpO2: 97% 94% 93%       Last vitals prior to Anesthesia Care Transfer:  CRNA VITALS  5/6/2021 1355 - 5/6/2021 1451      5/6/2021             Pulse:  69    SpO2:  93 %    Resp Rate (observed):  (!) 2          Electronically Signed By: Loki Flores  May 6, 2021  2:51 PM

## 2021-05-07 ENCOUNTER — NURSE TRIAGE (OUTPATIENT)
Dept: NURSING | Facility: CLINIC | Age: 51
End: 2021-05-07

## 2021-05-07 ENCOUNTER — APPOINTMENT (OUTPATIENT)
Dept: PHYSICAL THERAPY | Facility: CLINIC | Age: 51
End: 2021-05-07
Attending: ORTHOPAEDIC SURGERY
Payer: COMMERCIAL

## 2021-05-07 VITALS
OXYGEN SATURATION: 94 % | HEIGHT: 71 IN | RESPIRATION RATE: 16 BRPM | BODY MASS INDEX: 37.66 KG/M2 | HEART RATE: 69 BPM | TEMPERATURE: 98 F | WEIGHT: 269 LBS | SYSTOLIC BLOOD PRESSURE: 122 MMHG | DIASTOLIC BLOOD PRESSURE: 77 MMHG

## 2021-05-07 LAB
GLUCOSE SERPL-MCNC: 108 MG/DL (ref 70–99)
HGB BLD-MCNC: 13.7 G/DL (ref 13.3–17.7)

## 2021-05-07 PROCEDURE — 250N000013 HC RX MED GY IP 250 OP 250 PS 637: Performed by: PHYSICIAN ASSISTANT

## 2021-05-07 PROCEDURE — 250N000013 HC RX MED GY IP 250 OP 250 PS 637: Performed by: ORTHOPAEDIC SURGERY

## 2021-05-07 PROCEDURE — 250N000011 HC RX IP 250 OP 636: Performed by: ORTHOPAEDIC SURGERY

## 2021-05-07 PROCEDURE — 97116 GAIT TRAINING THERAPY: CPT | Mod: GP

## 2021-05-07 PROCEDURE — 36415 COLL VENOUS BLD VENIPUNCTURE: CPT | Performed by: ORTHOPAEDIC SURGERY

## 2021-05-07 PROCEDURE — 97161 PT EVAL LOW COMPLEX 20 MIN: CPT | Mod: GP

## 2021-05-07 PROCEDURE — 82947 ASSAY GLUCOSE BLOOD QUANT: CPT | Performed by: ORTHOPAEDIC SURGERY

## 2021-05-07 PROCEDURE — 85018 HEMOGLOBIN: CPT | Performed by: ORTHOPAEDIC SURGERY

## 2021-05-07 RX ORDER — HYDROMORPHONE HYDROCHLORIDE 2 MG/1
2-4 TABLET ORAL EVERY 4 HOURS PRN
Status: DISCONTINUED | OUTPATIENT
Start: 2021-05-07 | End: 2021-05-07 | Stop reason: HOSPADM

## 2021-05-07 RX ORDER — HYDROMORPHONE HYDROCHLORIDE 2 MG/1
2-4 TABLET ORAL EVERY 4 HOURS PRN
Qty: 50 TABLET | Refills: 0 | Status: SHIPPED | OUTPATIENT
Start: 2021-05-07 | End: 2021-07-12

## 2021-05-07 RX ADMIN — ASPIRIN 325 MG: 325 TABLET ORAL at 08:09

## 2021-05-07 RX ADMIN — OXYCODONE HYDROCHLORIDE 10 MG: 5 TABLET ORAL at 11:45

## 2021-05-07 RX ADMIN — LISINOPRIL 10 MG: 10 TABLET ORAL at 08:09

## 2021-05-07 RX ADMIN — HYDROXYZINE HYDROCHLORIDE 50 MG: 50 TABLET, FILM COATED ORAL at 08:09

## 2021-05-07 RX ADMIN — ACETAMINOPHEN 975 MG: 325 TABLET, FILM COATED ORAL at 11:45

## 2021-05-07 RX ADMIN — HYDROMORPHONE HYDROCHLORIDE 0.4 MG: 1 INJECTION, SOLUTION INTRAMUSCULAR; INTRAVENOUS; SUBCUTANEOUS at 04:27

## 2021-05-07 RX ADMIN — DOCUSATE SODIUM AND SENNOSIDES 1 TABLET: 8.6; 5 TABLET ORAL at 08:09

## 2021-05-07 RX ADMIN — ACETAMINOPHEN 975 MG: 325 TABLET, FILM COATED ORAL at 03:03

## 2021-05-07 RX ADMIN — OXYCODONE HYDROCHLORIDE 10 MG: 5 TABLET ORAL at 08:09

## 2021-05-07 RX ADMIN — CEFAZOLIN SODIUM 2 G: 2 INJECTION, SOLUTION INTRAVENOUS at 04:24

## 2021-05-07 RX ADMIN — HYDROXYZINE HYDROCHLORIDE 50 MG: 50 TABLET, FILM COATED ORAL at 01:54

## 2021-05-07 RX ADMIN — DOCUSATE SODIUM 100 MG: 100 CAPSULE, LIQUID FILLED ORAL at 08:09

## 2021-05-07 RX ADMIN — HYDROMORPHONE HYDROCHLORIDE 4 MG: 2 TABLET ORAL at 14:12

## 2021-05-07 RX ADMIN — ALLOPURINOL 300 MG: 300 TABLET ORAL at 08:09

## 2021-05-07 NOTE — PROGRESS NOTES
[facility] Medicine Progress Note  Date of Service: 05/07/2021    Assessment & Plan   Alen Gibbs is a 50 year old male who presented on 5/6/2021 for scheduled Procedure(s):  Total Knee Arthroplasty Right by Juan Manuel Landin MD and is being followed by the hospital medicine service for co-management of acute and/or chronic perioperative medical problems.      S/p Procedure(s):  Total Knee Arthroplasty Right   1 Day Post-Op    - Pain control, wound cares, physical therapy, occupational therapy and DVT prophylaxis per orthopedic surgery service.     Principal Problem:  Right knee DJD    Status post total knee replacement, right     Active Problems:  Essential hypertension with goal blood pressure less than 130/80   - Continue prior to admission lisinopril with holding parameters.       COVID-19 ruled out  Patient was tested on 5/2 for surgery, negative.       Gout   - Continue prior to admission allopurinol.       DVT Prophylaxis: as per orthopedic surgery service - Defer to primary service: ASA  Code Status: Full Code      Disposition: Anticipate discharge today following physical therapy and occupational therapy evaluation and if pain control is well managed with oral medications.     Attestation:  I have reviewed today's vital signs, notes, medications, labs and imaging.  I have discussed patient's care with attending physician, Dr. bryce Fritz PA-C       Interval History   Patient has done well post op. Pain has been well managed. Has been up ambulating with assist. He notes pain was better managed with the diluadid.     Denies chest pain, shortness of breath, abdominal pain. Tolerating regular diet. Had some mild nausea last night. Able to void.     Patient lives at home with wife and daughter.     Physical Exam   Temp:  [97.3  F (36.3  C)-98.9  F (37.2  C)] 97.6  F (36.4  C)  Pulse:  [56-78] 78  Resp:  [11-16] 16  BP: (103-139)/(69-88)  139/86  SpO2:  [93 %-97 %] 94 %    Weights:   Vitals:    05/06/21 1058   Weight: 122 kg (269 lb)    Body mass index is 37.78 kg/m .    General: Alert, oriented. Pleasant. No acute distress. Sitting up in bed.   CV: Regular rate and rhythm. No murmur appreciated. Good radial pulses bilaterally.   Respiratory: CTA bilaterally.   GI: Soft, non tender. Normal BS.   Skin: Warm and dry. Bandage intact to right knee. ACE wrap in place.   Musculoskeletal: Moves all extremities approprietly.      Data   Recent Labs   Lab 05/07/21  0450   HGB 13.7       No results for input(s): GLC, BGM in the last 168 hours.     Unresulted Labs Ordered in the Past 30 Days of this Admission     No orders found for last 31 day(s).           Imaging  Recent Results (from the past 24 hour(s))   XR Knee Port Right 1/2 Views    Narrative    Examination:  XR KNEE PORT RIGHT 1/2 VIEWS    Date:  5/6/2021 2:56 PM     Clinical Information: Post-Op Total Knee.     Comparison: 3/26/2021.      Impression    Impression:    1.  Uncomplicated new left total knee arthroplasty. Components appear  well seated with no evidence of immediate complication or  periprosthetic fracture. Normal joint alignment. Small amount of air  in the joint space.    LOGAN MURRAY MD        I reviewed all new labs and imaging results over the last 24 hours.

## 2021-05-07 NOTE — DISCHARGE SUMMARY
[facility] Orthopedics Discharge Summary                                       ESVIN LIRIANO [id number]   Age: 50 year old  PCP: Nito Pruitt, [phone] 1970     Date of Admission:  5/6/2021  Date of Discharge::  5/7/2021  Discharge Physician:  Livier Medina PA-C    Code status:  Full Code    Admission Information:  Admission Diagnosis:  Arthritis of right knee [M17.11]  Status post total knee replacement [Z96.659]    Post-Operative Day: 1 Day Post-Op     Reason for admission:  The patient was admitted for the following:Procedure(s) (LRB):  Total Knee Arthroplasty Right (Right)    Principal Problem:    Status post total knee replacement, right   Active Problems:    Essential hypertension with goal blood pressure less than 130/80    COVID-19 ruled out    Right knee DJD    Gout      Allergies:  Norco [hydrocodone-acetaminophen] and Simvastatin    Following the procedure noted above the patient was transferred to the post-op floor and started on:    Therapy:  physical therapy and occupational therapy  Anticoagulation Plan:  mg daily  for 30 days  Pain Management: oxycodone, tylenol and vistaril - changed to Dilaudid prior to discharge  Weight bearing status: Weight bearing as tolerated     The patient was followed and co-managed by the hospitalist service during the inpatient treatment course  Complications:  None  Consultations:  None     Pertinent Labs   Lab Results: personally reviewed.     Recent Labs   Lab Test 05/07/21  0450 04/13/21  1310 03/26/21  0840 11/25/19  1330 07/03/19  0946 02/22/19  1119 02/22/19  1119 07/27/18  1220   INR  --   --   --   --   --   --  0.97  --    HGB 13.7 14.5  --  15.4 14.9   < > 16.0 15.9   HCT  --  44.2  --  48.0 45.2   < > 47.7 48.6   MCV  --  93  --  93 92   < > 91 89   PLT  --  173  --  215 176   < > 192 198   NA  --   --  139 140 141   < > 141 139   CRP  --   --   --   --   --   --   --  19.9*    < > = values in this interval  not displayed.          Discharge Information:  Condition at discharge: Stable  Discharge destination:  Discharged to home     Medications at discharge:  Current Discharge Medication List      START taking these medications    Details   aspirin (ASA) 325 MG EC tablet Take 1 tablet (325 mg) by mouth daily  Qty: 30 tablet, Refills: 0    Associated Diagnoses: Status post right knee replacement      HYDROmorphone (DILAUDID) 2 MG tablet Take 1-2 tablets (2-4 mg) by mouth every 4 hours as needed for moderate to severe pain  Qty: 50 tablet, Refills: 0    Associated Diagnoses: Status post right knee replacement      hydrOXYzine (ATARAX) 25 MG tablet Take 1-2 tablets (25-50 mg) by mouth every 6 hours as needed for itching or anxiety (with pain, moderate pain)  Qty: 40 tablet, Refills: 0    Associated Diagnoses: Status post right knee replacement      !! ibuprofen (ADVIL/MOTRIN) 600 MG tablet Take 1 tablet (600 mg) by mouth every 6 hours as needed for pain (mild)  Qty: 30 tablet, Refills: 0    Associated Diagnoses: Status post right knee replacement      senna-docusate (SENOKOT-S/PERICOLACE) 8.6-50 MG tablet Take 1-2 tablets by mouth daily as needed for constipation Take while on oral narcotics to prevent or treat constipation.  Qty: 15 tablet, Refills: 0    Comments: While taking narcotics  Associated Diagnoses: Status post right knee replacement       !! - Potential duplicate medications found. Please discuss with provider.      CONTINUE these medications which have CHANGED    Details   acetaminophen (TYLENOL) 325 MG tablet Take 2 tablets (650 mg) by mouth every 4 hours as needed for other (mild pain)  Qty: 100 tablet, Refills: 0    Associated Diagnoses: Status post right knee replacement         CONTINUE these medications which have NOT CHANGED    Details   allopurinol (ZYLOPRIM) 300 MG tablet Take 1 tablet (300 mg) by mouth daily  Qty: 90 tablet, Refills: 3    Comments: Profile Rx: patient will contact pharmacy when  needed  Associated Diagnoses: Acute gouty arthritis      !! IBUPROFEN PO Take 800 mg by mouth every 8 hours as needed for moderate pain      lisinopril (ZESTRIL) 10 MG tablet Take 1 tablet (10 mg) by mouth daily  Qty: 90 tablet, Refills: 3    Comments: Profile Rx: patient will contact pharmacy when needed  Associated Diagnoses: Essential hypertension with goal blood pressure less than 130/80       !! - Potential duplicate medications found. Please discuss with provider.                     Follow-Up Care:  Patient should be seen in a [facility] office in 10-14 days by the patient's Orthopedic Surgeon/Physician Assistant.  Call [phone] for appointment or questions.    Livier Medina PA-C

## 2021-05-07 NOTE — PLAN OF CARE
OT: Per discussion with physical therapy no IP OT needs identified will discontinue orders at this time

## 2021-05-07 NOTE — PROGRESS NOTES
Initial IP Physical Therapy Evaluation     05/07/21 1154   Quick Adds   Type of Visit Initial PT Evaluation   Living Environment   People in home spouse   Current Living Arrangements house   Home Accessibility stairs to enter home   Number of Stairs, Main Entrance 3   Stair Railings, Main Entrance none   Number of Stairs, Within Home, Primary other (see comments)  (full flight)   Stair Railings, Within Home, Primary railing on left side (ascending)   Transportation Anticipated family or friend will provide   Living Environment Comments Patient able to live on main floor of home, sometimes needs to go to basement but does not have to right away.    Self-Care   Usual Activity Tolerance excellent   Current Activity Tolerance moderate   Equipment Currently Used at Home none   Activity/Exercise/Self-Care Comment Patient has no prior AD usage, states he golfs and walks often   Disability/Function   Hearing Difficulty or Deaf no   Wear Glasses or Blind no   Concentrating, Remembering or Making Decisions Difficulty no   Difficulty Communicating no   Difficulty Eating/Swallowing no   Walking or Climbing Stairs Difficulty no   Dressing/Bathing Difficulty no   Toileting issues no   Doing Errands Independently Difficulty (such as shopping) no   Fall history within last six months no   Change in Functional Status Since Onset of Current Illness/Injury no   General Information   Onset of Illness/Injury or Date of Surgery 05/06/21   Referring Physician Dr. Juan Manuel Landin   Patient/Family Therapy Goals Statement (PT) Go home today   Pertinent History of Current Problem (include personal factors and/or comorbidities that impact the POC) Patient is 51 y/o s/p R TKA on 5/6/21   Existing Precautions/Restrictions no known precautions/restrictions   Weight-Bearing Status - RLE weight-bearing as tolerated   Cognition   Orientation Status (Cognition) oriented x 4   Affect/Mental Status (Cognition) WNL   Follows Commands (Cognition) WNL    Pain Assessment   Patient Currently in Pain Yes, see Vital Sign flowsheet  (5/10 pain  with movement)   Integumentary/Edema   Integumentary/Edema no deficits were identifed   Posture    Posture Not impaired   Range of Motion (ROM)   ROM Comment R knee flexion to 90 degrees   Strength   Strength Comments R knee extension 3/5, shaky upon full extension, quad contraction normal   Bed Mobility   Comment (Bed Mobility) Supine<>sit Independent   Transfers   Transfer Safety Comments Sit<>stand Mod Ind with FWW   Gait/Stairs (Locomotion)   [place] Level (Gait) supervision   Assistive Device (Gait) walker, front-wheeled   Distance in Feet (Required for LE Total Joints) 125, 125   Pattern (Gait) step-through   Negotiation (Stairs) stairs independence;handrail location;number of steps   [place] Level (Stairs) supervision   Handrail Location (Stairs) left side (ascending)   Number of Steps (Stairs) 10 total   Comment (Gait/Stairs) Patient able to ambulate slowly but efficiently in hallway with antalgic gait pattern. Able to improved weight bearing amount onto RLE with cuing. Fatigued with increase in pain upon conclsion but tolerable. Stairs: Slow but  safe, step to step technique with good body mechanics after initial demonstration.   Balance   Balance other (describe)   Balance Comments Requires FWW, SBA   Sensory Examination   Sensory Perception WFL   Coordination   Coordination no deficits were identified   Muscle Tone   Muscle Tone no deficits were identified   Clinical Impression   Criteria for Skilled Therapeutic Intervention yes, treatment indicated   PT Diagnosis (PT) S/p R TKA   Influenced by the following impairments Pain, weakness, decreased activity tolerance   Functional limitations due to impairments Mobility, transfers   Clinical Presentation Stable/Uncomplicated   Clinical Presentation Rationale Clinical judgment   Clinical Decision Making (Complexity) low complexity   Therapy Frequency (PT) Daily    Predicted Duration of Therapy Intervention (days/wks) 1 day   Planned Therapy Interventions (PT) bed mobility training;gait training;home exercise program;ROM (range of motion);stair training;strengthening;transfer training   Anticipated Equipment Needs at Discharge (PT) walker, rolling   Risk & Benefits of therapy have been explained evaluation/treatment results reviewed;care plan/treatment goals reviewed;risks/benefits reviewed;current/potential barriers reviewed;participants voiced agreement with care plan;participants included;patient   Clinical Impression Comments Instructed patient on how to acquire FWW from [employer] store   PT Discharge Planning    PT Discharge Recommendation (DC Rec) home with outpatient physical therapy   PT Rationale for DC Rec Patient is ambulating safely and can complete stairs with minimal difficulty. Safe to discharge home. Has OP PT already scheduled per patient   PT Brief overview of current status  SBA all ambulation and transfers, 125' FWW   Total Evaluation Time   Total Evaluation Time (Minutes) 10     Marcelo Hernandez, PT, DPT

## 2021-05-07 NOTE — PROGRESS NOTES
[facility]      OUTPATIENT PHYSICAL THERAPY EVALUATION  PLAN OF TREATMENT FOR OUTPATIENT REHABILITATION  (COMPLETE FOR INITIAL CLAIMS ONLY)  Patient's Last Name, First Name, M.I.  YOB: 1970  Alen Gibbs                        Provider's Name  [facility] Medical Record No.  [id number]                               Onset Date:  05/06/21   Start of Care Date:  (P) 05/07/21      Type:     _X_PT   ___OT   ___SLP Medical Diagnosis:  (P) S/p R TKA                        PT Diagnosis:  S/p R TKA   Visits from SOC:  1   _________________________________________________________________________________  Plan of Treatment/Functional Goals    Planned Interventions: bed mobility training, gait training, home exercise program, ROM (range of motion), stair training, strengthening, transfer training     Goals: See Physical Therapy Goals on Care Plan in [employer] electronic health record.    Therapy Frequency: Daily  Predicted Duration of Therapy Intervention: 1 day  _________________________________________________________________________________    I CERTIFY THE NEED FOR THESE SERVICES FURNISHED UNDER        THIS PLAN OF TREATMENT AND WHILE UNDER MY CARE     (Physician co-signature of this document indicates review and certification of the therapy plan).                Certification date from: (P) 05/07/21, Certification date to: (P) 05/08/21    Referring Physician: Dr. Juan Manuel Landin            Initial Assessment        See Physical Therapy evaluation dated (P) 05/07/21 in Epic electronic health record.

## 2021-05-07 NOTE — PROGRESS NOTES
Patient vital signs are at baseline: Yes  Patient able to ambulate as they were prior to admission or with assist devices provided by therapies during their stay:  Yes  Patient MUST void prior to discharge:  Yes  Patient able to tolerate oral intake:  Yes  Pain has adequate pain control using Oral analgesics:  Yes    Dilaudid offers better pain control than oxycodone for this patient. He does become nauseous for a few minutes after administration of dilaudid, but no emesis so far. Up with SBA/assist x 1 with walker. Using urinal to void.

## 2021-05-07 NOTE — PROGRESS NOTES
"                  [facility] Orthopaedics Progress Note      Post-operative Day: 1 Day Post-Op    Procedure(s):  Total Knee Arthroplasty Right      Subjective:    Pain: moderate - pt thinks Dilaudid was actually doing a better job controlling his pain. Requests change to oral dilaudid  Chest pain, SOB:  No      Objective:  Blood pressure 122/77, pulse 69, temperature 98  F (36.7  C), temperature source Oral, resp. rate 16, height 1.797 m (5' 10.75\"), weight 122 kg (269 lb), SpO2 94 %.    Patient Vitals for the past 24 hrs:   BP Temp Temp src Pulse Resp SpO2   05/07/21 0854 -- -- -- -- 16 --   05/07/21 0809 -- -- -- -- 16 --   05/07/21 0751 122/77 98  F (36.7  C) Oral 69 16 94 %   05/06/21 2248 139/86 97.6  F (36.4  C) Oral 78 16 94 %   05/06/21 1939 126/80 97.3  F (36.3  C) Oral 73 14 95 %   05/06/21 1642 124/84 -- -- 65 14 93 %   05/06/21 1621 120/77 97.4  F (36.3  C) Oral 70 14 94 %   05/06/21 1530 106/69 -- -- 56 12 94 %   05/06/21 1445 107/81 -- -- 67 14 95 %   05/06/21 1430 103/69 -- -- 69 11 93 %   05/06/21 1426 109/73 98.9  F (37.2  C) Oral 70 16 94 %       Wt Readings from Last 4 Encounters:   05/06/21 122 kg (269 lb)   04/16/21 122 kg (269 lb)   04/13/21 122 kg (269 lb)   03/26/21 121.9 kg (268 lb 12.8 oz)         Motor function, sensation, and circulation intact   Yes  Wound status: incisions are clean dry and intact. Aquacel c/d/i  Calf tenderness: Bilateral  No    Pertinent Labs   Lab Results: personally reviewed.     Recent Labs   Lab Test 05/07/21  0450 04/13/21  1310 03/26/21  0840 11/25/19  1330 07/03/19  0946 02/22/19  1119 02/22/19  1119 07/27/18  1220   INR  --   --   --   --   --   --  0.97  --    HGB 13.7 14.5  --  15.4 14.9   < > 16.0 15.9   HCT  --  44.2  --  48.0 45.2   < > 47.7 48.6   MCV  --  93  --  93 92   < > 91 89   PLT  --  173  --  215 176   < > 192 198   NA  --   --  139 140 141   < > 141 139   CRP  --   --   --   --   --   --   --  19.9*    < > = values in this interval not " displayed.       Plan: Anticoagulation protocol:  mg daily  x 30  days            Pain medications:  oxycodone, tylenol and vistaril - will discontinue oxycodone and try dilaudid instead            Weight bearing status:  WBAT            Disposition:  Home today with outpt services             Continue cares and rehabilitation     Report completed by:  Livier Medina PA-C  Date: 5/7/2021  Time: 12:35 PM

## 2021-05-07 NOTE — PLAN OF CARE
[facility] DISCHARGE NOTE    Patient discharged to home at 2:55 PM via wheel chair. Accompanied by spouse and staff. Discharge instructions reviewed with patient, opportunity offered to ask questions. Prescriptions filled and sent with patient upon discharge. All belongings sent with patient.    Kari Maldonado RN

## 2021-05-07 NOTE — PLAN OF CARE
Physical Therapy Discharge Summary    Reason for therapy discharge:    Discharged to home with outpatient therapy.    Progress towards therapy goal(s). See goals on Care Plan in [facility] electronic health record for goal details.  Goals partially met.  Barriers to achieving goals:   discharge on same date as initial evaluation.    Therapy recommendation(s):    Continued therapy is recommended.  Rationale/Recommendations:  OP PT to maximize strength and mobility.

## 2021-05-07 NOTE — ADDENDUM NOTE
Addendum  created 05/07/21 1845 by Donna Yates APRN CRNA    Clinical Note Signed, Intraprocedure Event edited, Intraprocedure Staff edited

## 2021-05-08 NOTE — TELEPHONE ENCOUNTER
Pt called in states he had knee surgery yesterday and discharge today.  Pt states he has fever now.  Pt temp 101.9 temporal.  The Pt has pain.  The took pain medication.  No vomit, no bleeding.  No cough.  Care advice given per protocol.  Patient agrees with care advice given.   Agreed to call back if he has additional symptoms or questions.      Jairon Paris Darby Nurse Advisor 5/7/2021 8:30 PM      Reason for Disposition    Fever > 100.4 F (38.0 C)    Additional Information    Negative: Sounds like a life-threatening emergency to the triager    Negative: Chest pain    Negative: Difficulty breathing    Negative: Acting confused (e.g., disoriented, slurred speech) or excessively sleepy    Negative: Surgical incision symptoms and questions    Negative: [1] Discomfort (pain, burning or stinging) when passing urine AND [2] male    Negative: [1] Discomfort (pain, burning or stinging) when passing urine AND [2] female    Negative: Constipation    Negative: New or worsening leg (calf, thigh) pain    Negative: New or worsening leg swelling    Negative: Dizziness is severe, or persists > 24 hours after surgery    Negative: Pain, redness, swelling, or pus at IV Site    Negative: Symptoms arising from use of a urinary catheter (Snell or Coude)    Negative: Cast problems or questions    Negative: Medication question    Negative: [1] Widespread rash AND [2] bright red, sunburn-like    Negative: [1] SEVERE headache AND [2] after spinal (epidural) anesthesia    Negative: [1] Vomiting AND [2] persists > 4 hours    Negative: [1] Vomiting AND [2] abdomen looks much more swollen than usual    Negative: [1] Drinking very little AND [2] dehydration suspected (e.g., no urine > 12 hours, very dry mouth, very lightheaded)    Negative: Patient sounds very sick or weak to the triager    Negative: Sounds like a serious complication to the triager    Protocols used: POST-OP SYMPTOMS AND [id number]

## 2021-05-11 ENCOUNTER — HOSPITAL ENCOUNTER (OUTPATIENT)
Dept: PHYSICAL THERAPY | Facility: CLINIC | Age: 51
Setting detail: THERAPIES SERIES
End: 2021-05-11
Attending: ORTHOPAEDIC SURGERY
Payer: COMMERCIAL

## 2021-05-11 PROCEDURE — 97161 PT EVAL LOW COMPLEX 20 MIN: CPT | Mod: GP | Performed by: PHYSICAL THERAPIST

## 2021-05-11 PROCEDURE — 97110 THERAPEUTIC EXERCISES: CPT | Mod: GP | Performed by: PHYSICAL THERAPIST

## 2021-05-13 ENCOUNTER — HOSPITAL ENCOUNTER (OUTPATIENT)
Dept: PHYSICAL THERAPY | Facility: CLINIC | Age: 51
Setting detail: THERAPIES SERIES
End: 2021-05-13
Attending: ORTHOPAEDIC SURGERY
Payer: COMMERCIAL

## 2021-05-13 PROCEDURE — 97110 THERAPEUTIC EXERCISES: CPT | Mod: GP | Performed by: PHYSICAL THERAPIST

## 2021-05-18 ENCOUNTER — HOSPITAL ENCOUNTER (OUTPATIENT)
Dept: PHYSICAL THERAPY | Facility: CLINIC | Age: 51
Setting detail: THERAPIES SERIES
End: 2021-05-18
Attending: ORTHOPAEDIC SURGERY
Payer: COMMERCIAL

## 2021-05-18 PROCEDURE — 97110 THERAPEUTIC EXERCISES: CPT | Mod: GP | Performed by: PHYSICAL THERAPIST

## 2021-05-20 ENCOUNTER — TRANSFERRED RECORDS (OUTPATIENT)
Dept: HEALTH INFORMATION MANAGEMENT | Facility: CLINIC | Age: 51
End: 2021-05-20

## 2021-05-21 ENCOUNTER — HOSPITAL ENCOUNTER (OUTPATIENT)
Dept: PHYSICAL THERAPY | Facility: CLINIC | Age: 51
Setting detail: THERAPIES SERIES
End: 2021-05-21
Attending: ORTHOPAEDIC SURGERY
Payer: COMMERCIAL

## 2021-05-21 PROCEDURE — 97140 MANUAL THERAPY 1/> REGIONS: CPT | Mod: GP | Performed by: PHYSICAL THERAPIST

## 2021-05-21 PROCEDURE — 97110 THERAPEUTIC EXERCISES: CPT | Mod: GP | Performed by: PHYSICAL THERAPIST

## 2021-05-25 ENCOUNTER — HOSPITAL ENCOUNTER (OUTPATIENT)
Dept: PHYSICAL THERAPY | Facility: CLINIC | Age: 51
Setting detail: THERAPIES SERIES
End: 2021-05-25
Attending: ORTHOPAEDIC SURGERY
Payer: COMMERCIAL

## 2021-05-25 PROCEDURE — 97110 THERAPEUTIC EXERCISES: CPT | Mod: GP | Performed by: PHYSICAL THERAPIST

## 2021-05-25 PROCEDURE — 97140 MANUAL THERAPY 1/> REGIONS: CPT | Mod: GP | Performed by: PHYSICAL THERAPIST

## 2021-05-27 ENCOUNTER — HOSPITAL ENCOUNTER (OUTPATIENT)
Dept: PHYSICAL THERAPY | Facility: CLINIC | Age: 51
Setting detail: THERAPIES SERIES
End: 2021-05-27
Attending: ORTHOPAEDIC SURGERY
Payer: COMMERCIAL

## 2021-05-27 PROCEDURE — 97140 MANUAL THERAPY 1/> REGIONS: CPT | Mod: GP | Performed by: PHYSICAL THERAPIST

## 2021-05-27 PROCEDURE — 97110 THERAPEUTIC EXERCISES: CPT | Mod: GP | Performed by: PHYSICAL THERAPIST

## 2021-06-01 ENCOUNTER — HOSPITAL ENCOUNTER (OUTPATIENT)
Dept: PHYSICAL THERAPY | Facility: CLINIC | Age: 51
Setting detail: THERAPIES SERIES
End: 2021-06-01
Attending: ORTHOPAEDIC SURGERY
Payer: COMMERCIAL

## 2021-06-01 PROCEDURE — 97110 THERAPEUTIC EXERCISES: CPT | Mod: GP | Performed by: PHYSICAL THERAPIST

## 2021-06-01 PROCEDURE — 97140 MANUAL THERAPY 1/> REGIONS: CPT | Mod: GP | Performed by: PHYSICAL THERAPIST

## 2021-06-03 ENCOUNTER — HOSPITAL ENCOUNTER (OUTPATIENT)
Dept: PHYSICAL THERAPY | Facility: CLINIC | Age: 51
Setting detail: THERAPIES SERIES
End: 2021-06-03
Attending: ORTHOPAEDIC SURGERY
Payer: COMMERCIAL

## 2021-06-03 PROCEDURE — 97140 MANUAL THERAPY 1/> REGIONS: CPT | Mod: GP | Performed by: PHYSICAL THERAPIST

## 2021-06-03 PROCEDURE — 97110 THERAPEUTIC EXERCISES: CPT | Mod: GP | Performed by: PHYSICAL THERAPIST

## 2021-06-10 ENCOUNTER — HOSPITAL ENCOUNTER (OUTPATIENT)
Dept: PHYSICAL THERAPY | Facility: CLINIC | Age: 51
Setting detail: THERAPIES SERIES
End: 2021-06-10
Attending: ORTHOPAEDIC SURGERY
Payer: COMMERCIAL

## 2021-06-10 PROCEDURE — 97140 MANUAL THERAPY 1/> REGIONS: CPT | Mod: GP | Performed by: PHYSICAL THERAPIST

## 2021-06-10 PROCEDURE — 97110 THERAPEUTIC EXERCISES: CPT | Mod: GP | Performed by: PHYSICAL THERAPIST

## 2021-06-17 ENCOUNTER — TRANSFERRED RECORDS (OUTPATIENT)
Dept: HEALTH INFORMATION MANAGEMENT | Facility: CLINIC | Age: 51
End: 2021-06-17

## 2021-06-17 ENCOUNTER — HOSPITAL ENCOUNTER (OUTPATIENT)
Dept: PHYSICAL THERAPY | Facility: CLINIC | Age: 51
Setting detail: THERAPIES SERIES
End: 2021-06-17
Attending: ORTHOPAEDIC SURGERY
Payer: COMMERCIAL

## 2021-06-17 PROCEDURE — 97140 MANUAL THERAPY 1/> REGIONS: CPT | Mod: GP | Performed by: PHYSICAL THERAPIST

## 2021-06-17 PROCEDURE — 97110 THERAPEUTIC EXERCISES: CPT | Mod: GP | Performed by: PHYSICAL THERAPIST

## 2021-07-12 ENCOUNTER — OFFICE VISIT (OUTPATIENT)
Dept: FAMILY MEDICINE | Facility: CLINIC | Age: 51
End: 2021-07-12
Payer: COMMERCIAL

## 2021-07-12 VITALS
WEIGHT: 263.2 LBS | SYSTOLIC BLOOD PRESSURE: 114 MMHG | OXYGEN SATURATION: 96 % | HEART RATE: 72 BPM | HEIGHT: 71 IN | TEMPERATURE: 98.1 F | BODY MASS INDEX: 36.85 KG/M2 | DIASTOLIC BLOOD PRESSURE: 78 MMHG

## 2021-07-12 DIAGNOSIS — B36.0 TINEA VERSICOLOR: ICD-10-CM

## 2021-07-12 DIAGNOSIS — L91.8 SKIN TAG: Primary | ICD-10-CM

## 2021-07-12 PROCEDURE — 99213 OFFICE O/P EST LOW 20 MIN: CPT | Mod: 25 | Performed by: FAMILY MEDICINE

## 2021-07-12 PROCEDURE — 11200 RMVL SKIN TAGS UP TO&INC 15: CPT | Performed by: FAMILY MEDICINE

## 2021-07-12 RX ORDER — FLUCONAZOLE 150 MG/1
150 TABLET ORAL DAILY
Qty: 5 TABLET | Refills: 3 | Status: SHIPPED | OUTPATIENT
Start: 2021-07-12 | End: 2021-07-17

## 2021-07-12 ASSESSMENT — MIFFLIN-ST. JEOR: SCORE: 2074.96

## 2021-07-12 NOTE — PROCEDURES
After obtaining procedure skin tags were frozen with liquid nitrogen . There were a total of 10 skin tags, six on the left axilla and four on the right axilla. Patient tolerated the procedure well.

## 2021-07-12 NOTE — PROGRESS NOTES
"    Assessment & Plan     Tinea versicolor  Medication faxed.  - fluconazole (DIFLUCAN) 150 MG tablet; Take 1 tablet (150 mg) by mouth daily for 5 doses    Skin tag  Liquid nitrogen applied to skin tags. Patient tolerated the procedure well.  - REMOVAL OF SKIN TAGS, EA ADDTL 10      FUTURE APPOINTMENTS:       - Follow-up visit in one month or sooner as needed.  There are no Patient Instructions on file for this visit.    No follow-ups on file.    Mina Garcia MD  [facility]    Manuelito Lowry is a 50 year old who presents for the following health issues     HPI     50 yr old male here for a skin rash. He has had this on and off for years.- says diflucan helps. Denies any change in soaps or detergents. Patient reports no side effects to the medication.  Also has skin tags which have bothered him for awhile. They are localized in both arm pits.     Has standing order for Diflucan.   Has rash that appears annually.     Rash  Onset/Duration: 1 month recently   Description  Location: both sides, behind legs, under belly  Character: round, little darker than skin color   Itching: no  Intensity:  moderate  Progression of Symptoms:  same  Accompanying signs and symptoms:   Fever: no  Body aches or joint pain: no  Sore throat symptoms: no  Recent cold symptoms: no  History:           Previous episodes of similar rash: yes   New exposures:  None  Recent travel: no  Exposure to similar rash: no  This rash happens every year.   Precipitating or alleviating factors: Diflucan helps   Therapies tried and outcome: None     Pt has skin tags in both armpits, total of 15 estimated by the patient. Pt states they are, \"unsightly\".         Review of Systems   Constitutional, HEENT, cardiovascular, pulmonary, gi and gu systems are negative, except as otherwise noted.      Objective    /78   Pulse 72   Temp 98.1  F (36.7  C) (Tympanic)   Ht 1.81 m (5' 11.25\")   Wt 119.4 kg (263 lb 3.2 oz)   " SpO2 96%   BMI 36.45 kg/m    Body mass index is 36.45 kg/m .  Physical Exam   GENERAL: healthy, alert and no distress  SKIN: macule - torso and trunk and skin tags - 6 on left axilla and 4 on right axilla.

## 2021-09-10 ENCOUNTER — IMMUNIZATION (OUTPATIENT)
Dept: FAMILY MEDICINE | Facility: CLINIC | Age: 51
End: 2021-09-10
Payer: COMMERCIAL

## 2021-09-10 DIAGNOSIS — Z23 NEED FOR PROPHYLACTIC VACCINATION AND INOCULATION AGAINST INFLUENZA: Primary | ICD-10-CM

## 2021-09-10 PROCEDURE — 90471 IMMUNIZATION ADMIN: CPT

## 2021-09-10 PROCEDURE — 90682 RIV4 VACC RECOMBINANT DNA IM: CPT

## 2021-09-10 PROCEDURE — 99207 PR NO CHARGE NURSE ONLY: CPT

## 2021-09-18 ENCOUNTER — E-VISIT (OUTPATIENT)
Dept: FAMILY MEDICINE | Facility: CLINIC | Age: 51
End: 2021-09-18
Payer: COMMERCIAL

## 2021-09-18 DIAGNOSIS — Z20.822 SUSPECTED COVID-19 VIRUS INFECTION: Primary | ICD-10-CM

## 2021-09-18 PROCEDURE — 99421 OL DIG E/M SVC 5-10 MIN: CPT | Performed by: NURSE PRACTITIONER

## 2021-09-19 ENCOUNTER — LAB (OUTPATIENT)
Dept: FAMILY MEDICINE | Facility: CLINIC | Age: 51
End: 2021-09-19
Attending: NURSE PRACTITIONER
Payer: COMMERCIAL

## 2021-09-19 DIAGNOSIS — Z20.822 SUSPECTED COVID-19 VIRUS INFECTION: ICD-10-CM

## 2021-09-19 LAB — SARS-COV-2 RNA RESP QL NAA+PROBE: NEGATIVE

## 2021-09-19 PROCEDURE — U0003 INFECTIOUS AGENT DETECTION BY NUCLEIC ACID (DNA OR RNA); SEVERE ACUTE RESPIRATORY SYNDROME CORONAVIRUS 2 (SARS-COV-2) (CORONAVIRUS DISEASE [COVID-19]), AMPLIFIED PROBE TECHNIQUE, MAKING USE OF HIGH THROUGHPUT TECHNOLOGIES AS DESCRIBED BY CMS-2020-01-R: HCPCS

## 2021-09-19 PROCEDURE — U0005 INFEC AGEN DETEC AMPLI PROBE: HCPCS

## 2021-09-19 NOTE — PATIENT INSTRUCTIONS
Dear Alen Gibbs,    Your symptoms show that you may have coronavirus (COVID-19). This illness can cause fever, cough and trouble breathing. Many people get a mild case and get better on their own. Some people can get very sick.    Will I be tested for COVID-19?  We would like to test you for Covid-19 virus. I have placed orders for this test.     For all employees or close contacts (except [place] and [employer] - see below), go to your [employer] home page and scroll down to the section that says  You have an appointment that needs to be scheduled  and click the large green button that says  Schedule Now  and follow the steps to find the next available opening.     If you are unable to complete these steps or if you cannot find any available times, please call [phone] to schedule employee testing.     [place] employees or close contacts, please call [phone].   [place] ([facility]) employees or close contacts call [phone].    Return to work/school/ guidance:  Please let your workplace manager and staffing office know when your quarantine ends     We can t give you an exact date as it depends on the above. You can calculate this on your own or work with your manager/staffing office to calculate this. (For example if you were exposed on 10/4, you would have to quarantine for 14 full days. That would be through 10/18. You could return on 10/19.)      If you receive a positive COVID-19 test result, follow the guidance of the those who are giving you the results. Usually the return to work is 10 (or in some cases 20 days from symptom onset.) If you work at [employer], you must also be cleared by Employee Occupational Health and Safety to return to work.        If you receive a negative COVID-19 test result and did not have a high risk exposure to someone with a known positive COVID-19 test, you can return to work once you're free of fever for 24 hours without fever-reducing medication and  your symptoms are improving or resolved.      If you receive a negative COVID-19 test and If you had a high risk exposure to someone who has tested positive for COVID-19 then you can return to work 14 days after your last contact with the positive individual    Note: If you have ongoing exposure to the covid positive person, this quarantine period may be more than 14 days. (For example, if you are continued to be exposed to your child who tested positive and cannot isolate from them, then the quarantine of 7-14 days can't start until your child is no longer contagious. This is typically 10 days from onset of the child's symptoms. So the total duration may be 17-24 days in this case.)    Sign up for [email or web address].   We know it's scary to hear that you might have COVID-19. We want to track your symptoms to make sure you're okay over the next 2 weeks. Please look for an email from [email or web address]--this is a free, online program that we'll use to keep in touch. To sign up, follow the link in the email you will receive. Learn more at [email or web address]    How can I take care of myself?    Get lots of rest. Drink extra fluids (unless a doctor has told you not to)    Take Tylenol (acetaminophen) or ibuprofen for fever or pain. If you have liver or kidney problems, ask your family doctor if it's okay to take Tylenol o ibuprofen    If you have other health problems (like cancer, heart failure, an organ transplant or severe kidney disease): Call your specialty clinic if you don't feel better in the next 2 days.    Know when to call 911. Emergency warning signs include:  o Trouble breathing or shortness of breath  o Pain or pressure in the chest that doesn't go away  o Feeling confused like you haven't felt before, or not being able to wake up  o Bluish-colored lips or face    Where can I get more information?   [email or web address] [place] - About COVID-19:   [email or web address]    CDC - What to Do If You're Sick:    www.cdc.gov/coronavirus/2019-ncov/about/steps-when-sick.html    September 18, 2021  RE:  Alen Gibbs                                                                                                                  [address]   [place] [address]      To whom it may concern:    I evaluated Alen Gibbs on September 18, 2021. Alen Gibbs should be excused from work/school.     They should let their workplace manager and staffing office know when their quarantine ends.    We can not give an exact date as it depends on the information below. They can calculate this on their own or work with their manager/staffing office to calculate this. (For example if they were exposed on 10/04, they would have to quarantine for 14 full days. That would be through 10/18. They could return on 10/19.)    Quarantine Guidelines:      If patient receives a positive COVID-19 test result, they should follow the guidance of those who are giving the results. Usually the return to work is 10 (or in some cases 20 days from symptom onset.) If they work at [employer], they must be cleared by Employee Occupational Health and Safety to return to work.        If patient receives a negative COVID-19 test result and did not have a high risk exposure to someone with a known positive COVID-19 test, they can return to work once they're free of fever for 24 hours without fever-reducing medication and their symptoms are improving or resolved.      If patient receives a negative COVID-19 test and if they had a high risk exposure to someone who has tested positive for COVID-19 then they can return to work 14 days after their last contact with the positive individual    Note: If there is ongoing exposure to the covid positive person, this quarantine period may be longer than 14 days. (For example, if they are continually exposed to their child, who tested positive and cannot isolate from them, then the quarantine of 7-14  days can't start until their child is no longer contagious. This is typically 10 days from onset to the child's symptoms. So the total duration may be 17-24 days in this case.)     Sincerely,  Selena Armendariz, CNP

## 2021-12-13 NOTE — PROGRESS NOTES
"   PHYSICAL THERAPY DISCHARGE NOTE  06/17/21 0900   Signing Clinician's Name / Credentials   Signing clinician's name / credentials Leigh Jacques, PT, DPT, OCS   Session Number   Session Number 9 PO   Progress Note/Recertification   Progress Note Due Date 06/22/21   Adult Goals   PT Ortho Eval Goals 1;2;3;4   Ortho Goal 1   Goal Identifier 1   Goal Description Patient will be able to walk without an AD and without a limp.    Target Date 06/22/21   Date Met 06/17/21   Ortho Goal 2   Goal Identifier 2   Goal Description Patient will be able to ascend/descend stairs with 1 rail, reciprocal pattern, with minimal pain or difficulty.    Target Date 06/22/21   Date Met 06/17/21   Ortho Goal 3   Goal Identifier 3   Goal Description Patient will be able to squat to/from floor without pain or difficulty.    Target Date 06/22/21   Date Met 06/17/21   Ortho Goal 4   Goal Identifier 4   Goal Description Patient will be independent with HEP to aid functional recovery.    Target Date 06/22/21   Date Met 06/17/21   Subjective Report   Subjective Report Working hard on the extension.   Objective Measures   Objective Measures Objective Measure 1   Objective Measure 1   Objective Measure R Knee AAROM   Details 0 - 0 - 130   Treatment Interventions   Interventions Therapeutic Procedure/Exercise;Manual Therapy   Therapeutic Procedure/exercise   Therapeutic Procedures: strength, endurance, ROM, flexibillity minutes (71996) 10   Skilled Intervention HEP instruction, ROM, strength   Patient Response tolerated well   Treatment Detail Bike L4 (seat 11) full revolutions x 5 min. Lateral step downs 6\" x 30. KF lunge on step x 10. Free squats in mirror, added chair to get weight back x 20. pone hang.    Manual Therapy   Manual Therapy: Mobilization, MFR, MLD, friction massage minutes (94140) 15   Skilled Intervention MT to improve mobility and decrease pain to improve function   Patient Response improved knee extension after, no pain with knee " extension today   Treatment Detail tool assisted STM to distal hamstring, popliteal tam and proximal gastroc and anterior tibialis.   Education   Learner Patient;Significant Other   Readiness Eager   Method Booklet/handout;Explanation;Demonstration   Response Verbalizes Understanding;Demonstrates Understanding   Plan   Plan for next session f/u only if needed for tightness/pain posterior knee, otherwise cont HEP for strength and ROM   Total Session Time   Timed Code Treatment Minutes 25   Total Treatment Time (sum of timed and untimed services) 25   AMBULATORY CLINICS ONLY-MEDICAL AND TREATMENT DIAGNOSIS   PT Diagnosis R knee pain, swelling, decreased ROM, decreased strength, impaired gait and balance s/p R TKA 5/6/21     UPDATE 12/13/21: Patient did not need to return to PT for follow up. Discharge as planned.        Please contact me with any questions or concerns.  Thank you for your referral.    Leigh Jacques, PT, DPT, OCS  Physical Therapist, Orthopedic Certified Specialist    [facility]  [address] [place] [id number]  [email or web address]   Office: [phone]   Employed by [facility]

## 2022-04-19 ENCOUNTER — LAB REQUISITION (OUTPATIENT)
Dept: LAB | Facility: CLINIC | Age: 52
End: 2022-04-19

## 2022-04-19 PROCEDURE — 86481 TB AG RESPONSE T-CELL SUSP: CPT | Performed by: INTERNAL MEDICINE

## 2022-04-21 LAB
GAMMA INTERFERON BACKGROUND BLD IA-ACNC: 0.08 IU/ML
M TB IFN-G BLD-IMP: NEGATIVE
M TB IFN-G CD4+ BCKGRND COR BLD-ACNC: 9.92 IU/ML
MITOGEN IGNF BCKGRD COR BLD-ACNC: -0.01 IU/ML
MITOGEN IGNF BCKGRD COR BLD-ACNC: -0.01 IU/ML
QUANTIFERON MITOGEN: 10 IU/ML
QUANTIFERON NIL TUBE: 0.08 IU/ML
QUANTIFERON TB1 TUBE: 0.07 IU/ML
QUANTIFERON TB2 TUBE: 0.07

## 2022-04-29 ENCOUNTER — IMMUNIZATION (OUTPATIENT)
Dept: FAMILY MEDICINE | Facility: CLINIC | Age: 52
End: 2022-04-29
Payer: COMMERCIAL

## 2022-04-29 PROCEDURE — 0054A COVID-19,PF,PFIZER (12+ YRS): CPT

## 2022-04-29 PROCEDURE — 91305 COVID-19,PF,PFIZER (12+ YRS): CPT

## 2022-05-20 ENCOUNTER — OFFICE VISIT (OUTPATIENT)
Dept: FAMILY MEDICINE | Facility: CLINIC | Age: 52
End: 2022-05-20
Payer: COMMERCIAL

## 2022-05-20 VITALS
SYSTOLIC BLOOD PRESSURE: 124 MMHG | HEART RATE: 79 BPM | BODY MASS INDEX: 36.26 KG/M2 | DIASTOLIC BLOOD PRESSURE: 76 MMHG | TEMPERATURE: 97.6 F | HEIGHT: 71 IN | OXYGEN SATURATION: 95 % | WEIGHT: 259 LBS

## 2022-05-20 DIAGNOSIS — M10.9 ACUTE GOUTY ARTHRITIS: ICD-10-CM

## 2022-05-20 DIAGNOSIS — E66.01 MORBID OBESITY (H): ICD-10-CM

## 2022-05-20 DIAGNOSIS — I10 ESSENTIAL HYPERTENSION WITH GOAL BLOOD PRESSURE LESS THAN 130/80: ICD-10-CM

## 2022-05-20 DIAGNOSIS — Z00.00 ENCOUNTER FOR ROUTINE ADULT HEALTH EXAMINATION WITHOUT ABNORMAL FINDINGS: Primary | ICD-10-CM

## 2022-05-20 DIAGNOSIS — N52.9 ERECTILE DYSFUNCTION, UNSPECIFIED ERECTILE DYSFUNCTION TYPE: ICD-10-CM

## 2022-05-20 LAB
ANION GAP SERPL CALCULATED.3IONS-SCNC: 6 MMOL/L (ref 3–14)
BUN SERPL-MCNC: 19 MG/DL (ref 7–30)
CALCIUM SERPL-MCNC: 8.7 MG/DL (ref 8.5–10.1)
CHLORIDE BLD-SCNC: 109 MMOL/L (ref 94–109)
CHOLEST SERPL-MCNC: 204 MG/DL
CO2 SERPL-SCNC: 24 MMOL/L (ref 20–32)
CREAT SERPL-MCNC: 1.08 MG/DL (ref 0.66–1.25)
FASTING STATUS PATIENT QL REPORTED: YES
GFR SERPL CREATININE-BSD FRML MDRD: 83 ML/MIN/1.73M2
GLUCOSE BLD-MCNC: 99 MG/DL (ref 70–99)
HDLC SERPL-MCNC: 41 MG/DL
LDLC SERPL CALC-MCNC: 145 MG/DL
NONHDLC SERPL-MCNC: 163 MG/DL
POTASSIUM BLD-SCNC: 4.4 MMOL/L (ref 3.4–5.3)
SHBG SERPL-SCNC: 29 NMOL/L (ref 11–80)
SODIUM SERPL-SCNC: 139 MMOL/L (ref 133–144)
TRIGL SERPL-MCNC: 90 MG/DL

## 2022-05-20 PROCEDURE — 99396 PREV VISIT EST AGE 40-64: CPT | Performed by: FAMILY MEDICINE

## 2022-05-20 PROCEDURE — 99214 OFFICE O/P EST MOD 30 MIN: CPT | Mod: 25 | Performed by: FAMILY MEDICINE

## 2022-05-20 PROCEDURE — 80048 BASIC METABOLIC PNL TOTAL CA: CPT | Performed by: FAMILY MEDICINE

## 2022-05-20 PROCEDURE — 84270 ASSAY OF SEX HORMONE GLOBUL: CPT | Performed by: FAMILY MEDICINE

## 2022-05-20 PROCEDURE — 84403 ASSAY OF TOTAL TESTOSTERONE: CPT | Performed by: FAMILY MEDICINE

## 2022-05-20 PROCEDURE — 36415 COLL VENOUS BLD VENIPUNCTURE: CPT | Performed by: FAMILY MEDICINE

## 2022-05-20 PROCEDURE — 80061 LIPID PANEL: CPT | Performed by: FAMILY MEDICINE

## 2022-05-20 RX ORDER — LISINOPRIL 10 MG/1
10 TABLET ORAL DAILY
Qty: 90 TABLET | Refills: 3 | Status: SHIPPED | OUTPATIENT
Start: 2022-05-20 | End: 2023-08-28

## 2022-05-20 RX ORDER — SILDENAFIL 25 MG/1
25-50 TABLET, FILM COATED ORAL DAILY PRN
Qty: 18 TABLET | Refills: 1 | Status: SHIPPED | OUTPATIENT
Start: 2022-05-20 | End: 2024-03-21

## 2022-05-20 RX ORDER — ALLOPURINOL 300 MG/1
1 TABLET ORAL DAILY
Qty: 90 TABLET | Refills: 3 | Status: SHIPPED | OUTPATIENT
Start: 2022-05-20 | End: 2023-08-28

## 2022-05-20 ASSESSMENT — ENCOUNTER SYMPTOMS
HEMATURIA: 0
COUGH: 0
CONSTIPATION: 0
NAUSEA: 0
FEVER: 0
ARTHRALGIAS: 0
HEADACHES: 0
ABDOMINAL PAIN: 0
SHORTNESS OF BREATH: 0
DYSURIA: 0
HEMATOCHEZIA: 0
FREQUENCY: 0
PARESTHESIAS: 1
NERVOUS/ANXIOUS: 0
HEARTBURN: 0
MYALGIAS: 0
PALPITATIONS: 0
WEAKNESS: 0
JOINT SWELLING: 0
DIZZINESS: 0
EYE PAIN: 0
DIARRHEA: 0
SORE THROAT: 0
CHILLS: 0

## 2022-05-20 ASSESSMENT — PAIN SCALES - GENERAL: PAINLEVEL: NO PAIN (0)

## 2022-05-20 NOTE — LETTER
May 24, 2022      Alen Gibbs  [address] [facility] [address]        Dear ,    We are writing to inform you of your test results.      Test result was within normal parameters except the cholesterol was a bit high.   Recommend lifestyle changes and weight loss    Resulted Orders   Lipid panel reflex to direct LDL Fasting   Result Value Ref [facility]    Cholesterol 204 (H) <200 mg/dL    Triglycerides 90 <150 mg/dL    Direct Measure HDL 41 >=40 mg/dL    LDL Cholesterol Calculated 145 (H) <=100 mg/dL    Non HDL Cholesterol 163 (H) <130 mg/dL    Patient Fasting > 8hrs? Yes     Narrative    Cholesterol  Desirable:  <200 mg/dL    Triglycerides  Normal:  Less than 150 mg/dL  Borderline High:  150-199 mg/dL  High:  200-499 mg/dL  Very High:  Greater than or equal to 500 mg/dL    Direct Measure HDL  Female:  Greater than or equal to 50 mg/dL   Male:  Greater than or equal to 40 mg/dL    LDL Cholesterol  Desirable:  <100mg/dL  Above Desirable:  100-129 mg/dL   Borderline High:  130-159 mg/dL   High:  160-189 mg/dL   Very High:  >= 190 mg/dL    Non HDL Cholesterol  Desirable:  130 mg/dL  Above Desirable:  130-159 mg/dL  Borderline High:  160-189 mg/dL  High:  190-219 mg/dL  Very High:  Greater than or equal to 220 mg/dL   Basic metabolic panel  (Ca, Cl, CO2, Creat, Gluc, K, Na, BUN)   Result Value Ref [facility]    Sodium 139 133 - 144 mmol/L    Potassium 4.4 3.4 - 5.3 mmol/L    Chloride 109 94 - 109 mmol/L    Carbon Dioxide (CO2) 24 20 - 32 mmol/L    Anion Gap 6 3 - 14 mmol/L    Urea Nitrogen 19 7 - 30 mg/dL    Creatinine 1.08 0.66 - 1.25 mg/dL    Calcium 8.7 8.5 - 10.1 mg/dL    Glucose 99 70 - 99 mg/dL    GFR Estimate 83 >60 mL/min/1.73m2      Comment:      Effective December 21, 2021 eGFRcr in adults is calculated using the 2021 CKD-EPI creatinine equation which includes age and gender (Krystin saucedo al., NEJM, DOI: 10.1056/WXDOqm8847346)   Sex Hormone Binding Globulin   Result Value Ref [facility]    Sex  Hormone Binding Globulin 29 11 - 80 nmol/L   Testosterone Free and Total   Result Value Ref [facility]    Free Testosterone Calculated 6.27 ng/dL      Comment:      Male Vivek Ranges:  Vivek Stage I: Less than or equal to 0.37 ng/dL  Vivek Stage II: 0.03-2.1 ng/dL  Vivek Stage III: 0.10-9.8 ng/dL  Vivek Stage IV: 3.5-16.9 ng/dL  Vivek Stage V: 4.1-23.9 ng/dL    Testosterone Total 296 240 - 950 ng/dL    Narrative    This test was developed and its performance characteristics determined by the [facility],  [facility]. It has not been cleared or approved by the FDA. The laboratory is regulated under CLIA as qualified to perform high-complexity testing. This test is used for clinical purposes. It should not be regarded as investigational or for research.       If you have any questions or concerns, please call the clinic at the number listed above.       Sincerely,      Mina Garcia MD

## 2022-05-20 NOTE — PROGRESS NOTES
SUBJECTIVE:   CC: Alen Gibbs is an 51 year old male who presents for preventative health visit.     51 yr old male here for his annual physical. Patient reports no acute symptoms. Says he has been having issues with erectile dysfunction.   Medication refills sent in. Open to trying medication for erectile dysfunction.     Chief Complaint   Patient presents with     Physical     Pt would like his testosterone level checked-having some difficulties with erections. Also would like right ear checked-thinks a bug flew into it this morning.        Patient has been advised of split billing requirements and indicates understanding: Yes     Healthy Habits:     Getting at least 3 servings of Calcium per day:  NO    Bi-annual eye exam:  Yes    Dental care twice a year:  Yes    Sleep apnea or symptoms of sleep apnea:  None    Diet:  Regular (no restrictions)    Frequency of exercise:  2-3 days/week    Duration of exercise:  Less than 15 minutes    Taking medications regularly:  Yes    Medication side effects:  None    PHQ-2 Total Score: 0    Additional concerns today:  No          Today's PHQ-2 Score:   PHQ-2 ( 1999 Pfizer) 5/20/2022   Q1: Little interest or pleasure in doing things 0   Q2: Feeling down, depressed or hopeless 0   PHQ-2 Score 0   PHQ-2 Total Score (12-17 Years)- Positive if 3 or more points; Administer PHQ-A if positive -   Q1: Little interest or pleasure in doing things Not at all   Q2: Feeling down, depressed or hopeless Not at all   PHQ-2 Score 0       Abuse: Current or Past(Physical, Sexual or Emotional)- No  Do you feel safe in your environment? Yes      Social History     Tobacco Use     Smoking status: Never Smoker     Smokeless tobacco: Never Used   Substance Use Topics     Alcohol use: Yes     Comment: occ       Alcohol Use 5/20/2022   Prescreen: >3 drinks/day or >7 drinks/week? No   Prescreen: >3 drinks/day or >7 drinks/week? -       Last PSA: No results found for: PSA    Reviewed orders with  patient. Reviewed health maintenance and updated orders accordingly - Yes  Lab work is in process  Labs reviewed in EPIC  BP Readings from Last 3 Encounters:   05/20/22 124/76   07/12/21 114/78   05/07/21 122/77    Wt Readings from Last 3 Encounters:   05/20/22 117.5 kg (259 lb)   07/12/21 119.4 kg (263 lb 3.2 oz)   05/06/21 122 kg (269 lb)                  Patient Active Problem List   Diagnosis     CARDIOVASCULAR SCREENING; LDL GOAL LESS THAN 130     Tinea versicolor     Essential hypertension with goal blood pressure less than 130/80     Chondromalacia of patella, right     Obesity (BMI 35.0-39.9) with comorbidity (H)     Tinea corporis     Dehydration     Mixed hyperlipidemia     Status post total knee replacement, right      COVID-19 ruled out     Right knee DJD     Gout     Past Surgical History:   Procedure Laterality Date     ARTHROPLASTY KNEE Right 5/6/2021    Procedure: Total Knee Arthroplasty Right;  Surgeon: Juan Manuel Landin MD;  Location: [facility]     ARTHROTOMY WRIST Right 6/10/2016    Procedure: ARTHROTOMY WRIST;  Surgeon: Toni Ewing MD;  Location: [facility]     BONE MARROW ASPIRATION ONLY       COLONOSCOPY N/A 4/16/2021    Procedure: COLONOSCOPY;  Surgeon: Nito Platt MD;  Location: [facility]       Social History     Tobacco Use     Smoking status: Never Smoker     Smokeless tobacco: Never Used   Substance Use Topics     Alcohol use: Yes     Comment: occ     Family History   Problem Relation Age of Onset     Breast Cancer Mother      Cardiovascular Father         CHF     Diabetes Father 66        Type II     Alzheimer Disease Maternal Grandmother      Respiratory Paternal Grandfather         emphysema     Blood Disease Brother 4        leukemia     Diabetes Daughter         type I     Melanoma No family hx of          Current Outpatient Medications   Medication Sig Dispense Refill     allopurinol (ZYLOPRIM) 300 MG tablet Take 1 tablet (300 mg) by mouth daily 90 tablet 3     aspirin (ASA) 325  MG EC tablet Take 1 tablet (325 mg) by mouth daily 30 tablet 0     lisinopril (ZESTRIL) 10 MG tablet Take 1 tablet (10 mg) by mouth daily 90 tablet 3     sildenafil (VIAGRA) 25 MG tablet Take 1-2 tablets (25-50 mg) by mouth daily as needed (erectile dysfunction) 18 tablet 1       Reviewed and updated as needed this visit by clinical staff   Tobacco   Meds  Problems  Med Hx  Surg Hx  Fam Hx  Soc Hx          Reviewed and updated as needed this visit by Provider     Meds  Problems              Past Medical History:   Diagnosis Date     HTN (hypertension)      Hyperlipidemia      Syncope and collapse       Past Surgical History:   Procedure Laterality Date     ARTHROPLASTY KNEE Right 5/6/2021    Procedure: Total Knee Arthroplasty Right;  Surgeon: Juan Manuel Landin MD;  Location: [facility]     ARTHROTOMY WRIST Right 6/10/2016    Procedure: ARTHROTOMY WRIST;  Surgeon: Toni Ewing MD;  Location: [facility]     BONE MARROW ASPIRATION ONLY       COLONOSCOPY N/A 4/16/2021    Procedure: COLONOSCOPY;  Surgeon: Nito Platt MD;  Location: [facility]       Review of Systems   Constitutional: Negative for chills and fever.   HENT: Negative for congestion, ear pain, hearing loss and sore throat.    Eyes: Negative for pain and visual disturbance.   Respiratory: Negative for cough and shortness of breath.    Cardiovascular: Negative for chest pain, palpitations and peripheral edema.   Gastrointestinal: Negative for abdominal pain, constipation, diarrhea, heartburn, hematochezia and nausea.   Genitourinary: Negative for dysuria, frequency, genital sores, hematuria, impotence, penile discharge and urgency.   Musculoskeletal: Negative for arthralgias, joint swelling and myalgias.   Skin: Negative for rash.   Neurological: Positive for paresthesias. Negative for dizziness, weakness and headaches.   Psychiatric/Behavioral: Negative for mood changes. The patient is not nervous/anxious.          OBJECTIVE:   /76 (BP  "Location: Right arm)   Pulse 79   Temp 97.6  F (36.4  C) (Tympanic)   Ht 1.803 m (5' 11\")   Wt 117.5 kg (259 lb)   SpO2 95%   BMI 36.12 kg/m      Physical Exam  GENERAL: healthy, alert and no distress  EYES: Eyes grossly normal to inspection, PERRL and conjunctivae and sclerae normal  HENT: ear canals and TM's normal, nose and mouth without ulcers or lesions  NECK: no adenopathy, no asymmetry, masses, or scars and thyroid normal to palpation  RESP: lungs clear to auscultation - no rales, rhonchi or wheezes  CV: regular rate and rhythm, normal S1 S2, no S3 or S4, no murmur, click or rub, no peripheral edema and peripheral pulses strong  ABDOMEN: soft, nontender, no hepatosplenomegaly, no masses and bowel sounds normal  MS: no gross musculoskeletal defects noted, no edema  SKIN: no suspicious lesions or rashes  PSYCH: mentation appears normal, affect normal/bright    Diagnostic Test Results:  Pending     ASSESSMENT/PLAN:       ICD-10-CM    1. Encounter for routine adult health examination without abnormal findings  Z00.00    2. Essential hypertension with goal blood pressure less than 130/80  I10 Lipid panel reflex to direct LDL Fasting     Basic metabolic panel  (Ca, Cl, CO2, Creat, Gluc, K, Na, BUN)     lisinopril (ZESTRIL) 10 MG tablet     OFFICE/OUTPT VISIT,EST,LEVL IV   3. Acute gouty arthritis  M10.9 allopurinol (ZYLOPRIM) 300 MG tablet     OFFICE/OUTPT VISIT,EST,LEVL IV   4. Erectile dysfunction, unspecified erectile dysfunction type  N52.9 Testosterone Free and Total     sildenafil (VIAGRA) 25 MG tablet     OFFICE/OUTPT VISIT,EST,LEVL IV   5. Morbid obesity (H)  E66.01      51 yr old male here for his annual physical    Patient mentioned erectile dysfunction. Recommend medication    Other medication refilled    Labs ordered and patient will be notified of results     Patient has been advised of split billing requirements and indicates understanding: Yes    COUNSELING:   Reviewed preventive health " "counseling, as reflected in patient instructions       Regular exercise       Healthy diet/nutrition       Vision screening    Estimated body mass index is 36.12 kg/m  as calculated from the following:    Height as of this encounter: 1.803 m (5' 11\").    Weight as of this encounter: 117.5 kg (259 lb).     Weight management plan: Discussed healthy diet and exercise guidelines    He reports that he has never smoked. He has never used smokeless tobacco.      Counseling Resources:  ATP IV Guidelines  Pooled Cohorts Equation Calculator  FRAX Risk Assessment  ICSI Preventive Guidelines  Dietary Guidelines for Americans, 2010  USDA's MyPlate  ASA Prophylaxis  Lung CA Screening    Mina Garcia MD  [facility]  "

## 2022-05-23 LAB
TESTOST FREE SERPL-MCNC: 6.27 NG/DL
TESTOST SERPL-MCNC: 296 NG/DL (ref 240–950)

## 2022-05-24 NOTE — RESULT ENCOUNTER NOTE
Please inform patient that test result was within normal parameters except the cholesterol was a bit high.   Recommend lifestyle changes and weight loss.   Thank you.     Mina Garcia M.D.

## 2022-07-07 ENCOUNTER — ALLIED HEALTH/NURSE VISIT (OUTPATIENT)
Dept: FAMILY MEDICINE | Facility: CLINIC | Age: 52
End: 2022-07-07
Payer: COMMERCIAL

## 2022-07-07 DIAGNOSIS — Z23 NEED FOR VACCINATION: Primary | ICD-10-CM

## 2022-07-07 PROCEDURE — 90471 IMMUNIZATION ADMIN: CPT

## 2022-07-07 PROCEDURE — 90750 HZV VACC RECOMBINANT IM: CPT

## 2022-07-07 PROCEDURE — 99207 PR NO CHARGE NURSE ONLY: CPT

## 2022-07-07 NOTE — PROGRESS NOTES
Chief Complaint   Patient presents with     Imm/Inj     shingrix     Prior to immunization administration, verified patients identity using patient s name and date of birth. Please see Immunization Activity for additional information.     Screening Questionnaire for Adult Immunization    Are you sick today?   No   Do you have allergies to medications, food, a vaccine component or latex?   No   Have you ever had a serious reaction after receiving a vaccination?   No   Do you have a long-term health problem with heart, lung, kidney, or metabolic disease (e.g., diabetes), asthma, a blood disorder, no spleen, complement component deficiency, a cochlear implant, or a spinal fluid leak?  Are you on long-term aspirin therapy?   No   Do you have cancer, leukemia, HIV/AIDS, or any other immune system problem?   No   Do you have a parent, brother, or sister with an immune system problem?   No   In the past 3 months, have you taken medications that affect  your immune system, such as prednisone, other steroids, or anticancer drugs; drugs for the treatment of rheumatoid arthritis, Crohn s disease, or psoriasis; or have you had radiation treatments?   No   Have you had a seizure, or a brain or other nervous system problem?   No   During the past year, have you received a transfusion of blood or blood    products, or been given immune (gamma) globulin or antiviral drug?   No   For women: Are you pregnant or is there a chance you could become       pregnant during the next month?   No   Have you received any vaccinations in the past 4 weeks?   No     Immunization questionnaire answers were all negative.        Per orders of Dr. Mondragon, injection of Shingrix given by Leigh Cuellar CMA. Patient instructed to remain in clinic for 15 minutes afterwards, and to report any adverse reaction to me immediately.       Screening performed by Leigh Cuellar CMA on 7/7/2022 at 4:10 PM.

## 2022-08-11 ENCOUNTER — OFFICE VISIT (OUTPATIENT)
Dept: DERMATOLOGY | Facility: CLINIC | Age: 52
End: 2022-08-11
Payer: COMMERCIAL

## 2022-08-11 DIAGNOSIS — L30.9 DERMATITIS: Primary | ICD-10-CM

## 2022-08-11 DIAGNOSIS — D18.01 CHERRY ANGIOMA: ICD-10-CM

## 2022-08-11 DIAGNOSIS — L81.4 LENTIGO: ICD-10-CM

## 2022-08-11 DIAGNOSIS — L82.1 SEBORRHEIC KERATOSIS: ICD-10-CM

## 2022-08-11 DIAGNOSIS — L82.0 INFLAMED SEBORRHEIC KERATOSIS: ICD-10-CM

## 2022-08-11 DIAGNOSIS — D23.9 DERMATOFIBROMA: ICD-10-CM

## 2022-08-11 DIAGNOSIS — D22.9 MULTIPLE BENIGN NEVI: ICD-10-CM

## 2022-08-11 PROCEDURE — 17110 DESTRUCTION B9 LES UP TO 14: CPT | Performed by: PHYSICIAN ASSISTANT

## 2022-08-11 PROCEDURE — 99213 OFFICE O/P EST LOW 20 MIN: CPT | Mod: 25 | Performed by: PHYSICIAN ASSISTANT

## 2022-08-11 RX ORDER — TRIAMCINOLONE ACETONIDE 1 MG/G
CREAM TOPICAL
Qty: 45 G | Refills: 4 | Status: SHIPPED | OUTPATIENT
Start: 2022-08-11 | End: 2023-04-19

## 2022-08-11 ASSESSMENT — PAIN SCALES - GENERAL: PAINLEVEL: NO PAIN (0)

## 2022-08-11 NOTE — LETTER
8/11/2022         RE: Alen Gibbs  [address] [facility]   [facility] [address]        Dear Colleague,    Thank you for referring your patient, Alen Gibbs, to the [facility]. Please see a copy of my visit note below.    Alen Gibbs is an extremely pleasant 51 year old year old male patient here today for skin check. He notes some rough areas on forehead. He also notes eczema patch on groin. He notes he has tried nizoral which has not been helpful. He denies any painful or bleeding skin lesions.  Patient has no other skin complaints today.  Remainder of the HPI, Meds, PMH, Allergies, FH, and SH was reviewed in chart.    Pertinent Hx: No personal history of skin cancer   Past Medical History:   Diagnosis Date     HTN (hypertension)      Hyperlipidemia      Syncope and collapse        Past Surgical History:   Procedure Laterality Date     ARTHROPLASTY KNEE Right 5/6/2021    Procedure: Total Knee Arthroplasty Right;  Surgeon: Juan Manuel Landin MD;  Location: [facility]     ARTHROTOMY WRIST Right 6/10/2016    Procedure: ARTHROTOMY WRIST;  Surgeon: Toni Ewing MD;  Location: [facility]     BONE MARROW ASPIRATION ONLY       COLONOSCOPY N/A 4/16/2021    Procedure: COLONOSCOPY;  Surgeon: Nito Platt MD;  Location: [facility]        Family History   Problem Relation Age of Onset     Breast Cancer Mother      Cardiovascular Father         CHF     Diabetes Father 66        Type II     Alzheimer Disease Maternal Grandmother      Respiratory Paternal Grandfather         emphysema     Blood Disease Brother 4        leukemia     Diabetes Daughter         type I     Melanoma No family hx of        Social History     Socioeconomic History     Marital status:      Spouse name: Not on file     Number of children: Not on file     Years of education: Not on file     Highest education level: Not on file   Occupational History     Not on file   Tobacco Use     Smoking status: Never Smoker      Smokeless tobacco: Never Used   Vaping Use     Vaping Use: Never used   Substance and Sexual Activity     Alcohol use: Yes     Comment: occ     Drug use: No     Sexual activity: Yes     Partners: Female   Other Topics Concern     Parent/sibling w/ CABG, MI or angioplasty before 65F 55M? No   Social History Narrative     Not on file     Social Determinants of Health     Financial Resource Strain: Not on file   Food Insecurity: Not on file   Transportation Needs: Not on file   Physical Activity: Not on file   Stress: Not on file   Social Connections: Not on file   Intimate Partner Violence: Not on file   Housing Stability: Not on file       Outpatient Encounter Medications as of 8/11/2022   Medication Sig Dispense Refill     allopurinol (ZYLOPRIM) 300 MG tablet Take 1 tablet (300 mg) by mouth daily 90 tablet 3     aspirin (ASA) 325 MG EC tablet Take 1 tablet (325 mg) by mouth daily 30 tablet 0     lisinopril (ZESTRIL) 10 MG tablet Take 1 tablet (10 mg) by mouth daily 90 tablet 3     sildenafil (VIAGRA) 25 MG tablet Take 1-2 tablets (25-50 mg) by mouth daily as needed (erectile dysfunction) 18 tablet 1     triamcinolone (KENALOG) 0.1 % external cream Apply twice daily as needed. 45 g 4     No facility-administered encounter medications on file as of 8/11/2022.             O:   NAD, WDWN, Alert & Oriented, Mood & Affect wnl, Vitals stable   Here today alone   There were no vitals taken for this visit.   General appearance normal   Vitals stable   Alert, oriented and in no acute distress    Stuck on papules and brown macules on trunk and ext   Red papules on trunk  Brown papules and macules with regular pigment network and borders on torso and extremities   Eczematous plaque  On right groin  Poikiloderma on neck   Firm papule with positive dimple sign on back    The remainder of skin exam is normal     Eyes: Conjunctivae/lids:Normal     ENT: Lips: normal    MSK:Normal    Cardiovascular: peripheral edema none    Pulm:  Breathing Normal    Lymph Nodes: No Head and Neck Lymphadenopathy     Neuro/Psych: Orientation:Alert and Orientedx3 ; Mood/Affect:normal   A/P:  1. Inflamed seborrheic keratosis on forehead x 1  LN2:  Treated with LN2 for 5s for 1-2 cycles. Warned risks of blistering, pain, pigment change, scarring, and incomplete resolution.  Advised patient to return if lesions do not completely resolve.  Wound care sheet given.  2. Dermatitis   Apply  Triamcinolone as needed for next 2 weeks.   3. Seborrheic keratosis, lentigo, angioma, benign nevi, dermatofibroma, poikiloderma   It was a pleasure speaking to Alen Gibbs today.  BENIGN LESIONS DISCUSSED WITH PATIENT:  I discussed the specifics of tumor, prognosis, and genetics of benign lesions.  I explained that treatment of these lesions would be purely cosmetic and not medically neccessary.  I discussed with patient different removal options including excision, cautery and /or laser.      Nature and genetics of benign skin lesions dicussed with patient.  Signs and Symptoms of skin cancer discussed with patient.  ABCDEs of melanoma reviewed with patient.  Patient encouraged to perform monthly skin exams.  UV precautions reviewed with patient.  Risks of non-melanoma skin cancer discussed with patient   Return to clinic in one year or sooner if needed.         Again, thank you for allowing me to participate in the care of your patient.        Sincerely,        Lilia Moss PA-C

## 2022-08-11 NOTE — PATIENT INSTRUCTIONS
WOUND CARE INSTRUCTIONS   FOR CRYOSURGERY   This area treated with liquid nitrogen should form a blister (areas treated may or may not blister-skin may just turn dark and slough off). You do not need to bandage the area unless a blister forms and breaks (which may be a few days). When the blister breaks, begin daily dressing changes as follows:   1) Clean and dry the area with tap water using clean Q-tip or sterile gauze pad.   2) Apply Polysporin ointment or Bacitracin ointment over entire wound. Do NOT use Neosporin ointment.   3) Cover the wound with a band-aid or sterile non-stick gauze pad and micropore paper tape.   REPEAT THESE INSTRUCTIONS AT LEAST ONCE A DAY UNTIL THE WOUND HAS COMPLETELY HEALED.   It is an old wives tale that a wound heals better when it is exposed to air and allowed to dry out. The wound will heal faster with a better cosmetic result if it is kept moist with ointment and covered with a bandage.   *Do not let the wound dry out.   Supplies Needed:   *Cotton tipped applicators (Q-tips)   *Polysporin ointment or Bacitracin ointment (NOT NEOSPORIN)   *Band-aids, or non stick gauze pads and micropore paper tape   PATIENT INFORMATION   During the healing process you will notice a number of changes. All wounds develop a small halo of redness surrounding the wound. This means healing is occurring. Severe itching with extensive redness usually indicates sensitivity to the ointment or bandage tape used to dress the wound. You should call our office if this develops.   Swelling and/or discoloration around your surgical site is common, particularly when performed around the eye.   All wounds normally drain. The larger the wound the more drainage there will be. After 7-10 days, you will notice the wound beginning to shrink and new skin will begin to grow. The wound is healed when you can see skin has formed over the entire area. A healed wound has a healthy, shiny look to the surface and is red to dark  pink in color to normalize. Wounds may take approximately 4-6 weeks to heal. Larger wounds may take 6-8 weeks. After the wound is healed you may discontinue dressing changes.   You may experience a sensation of tightness as your wound heals. This is normal and will gradually subside.   Your healed wound may be sensitive to temperature changes. This sensitivity improves with time, but if you re having a lot of discomfort, try to avoid temperature extremes.   Patients frequently experience itching after their wound appears to have healed because of the continue healing under the skin. Plain Vaseline will help relieve the itching.        Proper skin care from Dr. Guzman- [facility]     Eliminate harsh soaps, i.e. Dial, Zest, [place] Spring;   Use mild soaps such as Cetaphil or Dove Sensitive Skin   Avoid overly hot or cold showers   After showering, lightly pat dry off.   Apply moisturizer immediately to damp dry skin.   Aggressive use of a moisturizer (including Vanicream, Cetaphil, Aquaphor, Eucerin, or Cerave)   We recommend using tub-style moisturizer that needs to be scooped out by hand, not a pump. This has more of an oil base. It will hold moisture in your skin much better than a water base moisturizer. The ones recommended are non- pore clogging.       If you have any questions call [phone] and follow the prompts to the dermatology office.

## 2022-08-12 NOTE — PROGRESS NOTES
Alen Gibbs is an extremely pleasant 51 year old year old male patient here today for skin check. He notes some rough areas on forehead. He also notes eczema patch on groin. He notes he has tried nizoral which has not been helpful. He denies any painful or bleeding skin lesions.  Patient has no other skin complaints today.  Remainder of the HPI, Meds, PMH, Allergies, FH, and SH was reviewed in chart.    Pertinent Hx: No personal history of skin cancer   Past Medical History:   Diagnosis Date     HTN (hypertension)      Hyperlipidemia      Syncope and collapse        Past Surgical History:   Procedure Laterality Date     ARTHROPLASTY KNEE Right 5/6/2021    Procedure: Total Knee Arthroplasty Right;  Surgeon: Juan Manuel Landin MD;  Location: [facility]     ARTHROTOMY WRIST Right 6/10/2016    Procedure: ARTHROTOMY WRIST;  Surgeon: Toni Ewing MD;  Location: [facility]     BONE MARROW ASPIRATION ONLY       COLONOSCOPY N/A 4/16/2021    Procedure: COLONOSCOPY;  Surgeon: Nito Platt MD;  Location: [facility]        Family History   Problem Relation Age of Onset     Breast Cancer Mother      Cardiovascular Father         CHF     Diabetes Father 66        Type II     Alzheimer Disease Maternal Grandmother      Respiratory Paternal Grandfather         emphysema     Blood Disease Brother 4        leukemia     Diabetes Daughter         type I     Melanoma No family hx of        Social History     Socioeconomic History     Marital status:      Spouse name: Not on file     Number of children: Not on file     Years of education: Not on file     Highest education level: Not on file   Occupational History     Not on file   Tobacco Use     Smoking status: Never Smoker     Smokeless tobacco: Never Used   Vaping Use     Vaping Use: Never used   Substance and Sexual Activity     Alcohol use: Yes     Comment: occ     Drug use: No     Sexual activity: Yes     Partners: Female   Other Topics Concern     Parent/sibling w/ CABG,  MI or angioplasty before 65F 55M? No   Social History Narrative     Not on file     Social Determinants of Health     Financial Resource Strain: Not on file   Food Insecurity: Not on file   Transportation Needs: Not on file   Physical Activity: Not on file   Stress: Not on file   Social Connections: Not on file   Intimate Partner Violence: Not on file   Housing Stability: Not on file       Outpatient Encounter Medications as of 8/11/2022   Medication Sig Dispense Refill     allopurinol (ZYLOPRIM) 300 MG tablet Take 1 tablet (300 mg) by mouth daily 90 tablet 3     aspirin (ASA) 325 MG EC tablet Take 1 tablet (325 mg) by mouth daily 30 tablet 0     lisinopril (ZESTRIL) 10 MG tablet Take 1 tablet (10 mg) by mouth daily 90 tablet 3     sildenafil (VIAGRA) 25 MG tablet Take 1-2 tablets (25-50 mg) by mouth daily as needed (erectile dysfunction) 18 tablet 1     triamcinolone (KENALOG) 0.1 % external cream Apply twice daily as needed. 45 g 4     No facility-administered encounter medications on file as of 8/11/2022.             O:   NAD, WDWN, Alert & Oriented, Mood & Affect wnl, Vitals stable   Here today alone   There were no vitals taken for this visit.   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 Inflamed seborrheic keratosis on forehead x 1  LN2:  Treated with LN2 for 5s for 1-2 cycles. Warned risks of  blistering, pain, pigment change, scarring, and incomplete resolution.  Advised patient to return if lesions do not completely resolve.  Wound care sheet given.  2. Dermatitis   Apply  Triamcinolone as needed for next 2 weeks.   3. Seborrheic keratosis, lentigo, angioma, benign nevi, dermatofibroma, poikiloderma   It was a pleasure speaking to Alen Gibbs today.  BENIGN LESIONS DISCUSSED WITH PATIENT:  I discussed the specifics of tumor, prognosis, and genetics of benign lesions.  I explained that treatment of these lesions would be purely cosmetic and not medically neccessary.  I discussed with patient different removal options including excision, cautery and /or laser.      Nature and genetics of benign skin lesions dicussed with patient.  Signs and Symptoms of skin cancer discussed with patient.  ABCDEs of melanoma reviewed with patient.  Patient encouraged to perform monthly skin exams.  UV precautions reviewed with patient.  Risks of non-melanoma skin cancer discussed with patient   Return to clinic in one year or sooner if needed.

## 2022-11-19 ENCOUNTER — HEALTH MAINTENANCE LETTER (OUTPATIENT)
Age: 52
End: 2022-11-19

## 2023-04-15 ENCOUNTER — HOSPITAL ENCOUNTER (EMERGENCY)
Facility: CLINIC | Age: 53
Discharge: HOME | End: 2023-04-15
Attending: EMERGENCY MEDICINE | Admitting: EMERGENCY MEDICINE
Payer: COMMERCIAL

## 2023-04-15 ENCOUNTER — APPOINTMENT (OUTPATIENT)
Dept: CT IMAGING | Facility: CLINIC | Age: 53
End: 2023-04-15
Attending: EMERGENCY MEDICINE
Payer: COMMERCIAL

## 2023-04-15 VITALS
RESPIRATION RATE: 16 BRPM | HEART RATE: 75 BPM | HEIGHT: 70 IN | WEIGHT: 259 LBS | BODY MASS INDEX: 37.08 KG/M2 | TEMPERATURE: 97.2 F | OXYGEN SATURATION: 94 % | DIASTOLIC BLOOD PRESSURE: 94 MMHG | SYSTOLIC BLOOD PRESSURE: 129 MMHG

## 2023-04-15 DIAGNOSIS — R40.4 UNRESPONSIVE EPISODE: ICD-10-CM

## 2023-04-15 DIAGNOSIS — I49.3 PVC'S (PREMATURE VENTRICULAR CONTRACTIONS): ICD-10-CM

## 2023-04-15 LAB
ANION GAP SERPL CALCULATED.3IONS-SCNC: 14 MMOL/L (ref 7–15)
BASOPHILS # BLD AUTO: 0.1 10E3/UL (ref 0–0.2)
BASOPHILS NFR BLD AUTO: 1 %
BUN SERPL-MCNC: 16 MG/DL (ref 6–20)
CALCIUM SERPL-MCNC: 9.6 MG/DL (ref 8.6–10)
CHLORIDE SERPL-SCNC: 103 MMOL/L (ref 98–107)
CREAT SERPL-MCNC: 1.02 MG/DL (ref 0.67–1.17)
DEPRECATED HCO3 PLAS-SCNC: 22 MMOL/L (ref 22–29)
EOSINOPHIL # BLD AUTO: 0.3 10E3/UL (ref 0–0.7)
EOSINOPHIL NFR BLD AUTO: 3 %
ERYTHROCYTE [DISTWIDTH] IN BLOOD BY AUTOMATED COUNT: 13.1 % (ref 10–15)
GFR SERPL CREATININE-BSD FRML MDRD: 88 ML/MIN/1.73M2
GLUCOSE SERPL-MCNC: 116 MG/DL (ref 70–99)
HCT VFR BLD AUTO: 44.4 % (ref 40–53)
HGB BLD-MCNC: 15 G/DL (ref 13.3–17.7)
HOLD SPECIMEN: NORMAL
IMM GRANULOCYTES # BLD: 0 10E3/UL
IMM GRANULOCYTES NFR BLD: 0 %
LYMPHOCYTES # BLD AUTO: 2.6 10E3/UL (ref 0.8–5.3)
LYMPHOCYTES NFR BLD AUTO: 32 %
MAGNESIUM SERPL-MCNC: 1.9 MG/DL (ref 1.7–2.3)
MCH RBC QN AUTO: 30.5 PG (ref 26.5–33)
MCHC RBC AUTO-ENTMCNC: 33.8 G/DL (ref 31.5–36.5)
MCV RBC AUTO: 90 FL (ref 78–100)
MONOCYTES # BLD AUTO: 0.7 10E3/UL (ref 0–1.3)
MONOCYTES NFR BLD AUTO: 8 %
NEUTROPHILS # BLD AUTO: 4.7 10E3/UL (ref 1.6–8.3)
NEUTROPHILS NFR BLD AUTO: 56 %
NRBC # BLD AUTO: 0 10E3/UL
NRBC BLD AUTO-RTO: 0 /100
PLATELET # BLD AUTO: 164 10E3/UL (ref 150–450)
POTASSIUM SERPL-SCNC: 3.8 MMOL/L (ref 3.4–5.3)
RBC # BLD AUTO: 4.91 10E6/UL (ref 4.4–5.9)
SODIUM SERPL-SCNC: 139 MMOL/L (ref 136–145)
TSH SERPL DL<=0.005 MIU/L-ACNC: 2 UIU/ML (ref 0.3–4.2)
WBC # BLD AUTO: 8.3 10E3/UL (ref 4–11)

## 2023-04-15 PROCEDURE — 85025 COMPLETE CBC W/AUTO DIFF WBC: CPT | Performed by: EMERGENCY MEDICINE

## 2023-04-15 PROCEDURE — 99284 EMERGENCY DEPT VISIT MOD MDM: CPT | Mod: 25 | Performed by: EMERGENCY MEDICINE

## 2023-04-15 PROCEDURE — 93005 ELECTROCARDIOGRAM TRACING: CPT | Performed by: EMERGENCY MEDICINE

## 2023-04-15 PROCEDURE — 70450 CT HEAD/BRAIN W/O DYE: CPT

## 2023-04-15 PROCEDURE — 83735 ASSAY OF MAGNESIUM: CPT | Performed by: EMERGENCY MEDICINE

## 2023-04-15 PROCEDURE — 99285 EMERGENCY DEPT VISIT HI MDM: CPT | Mod: 25 | Performed by: EMERGENCY MEDICINE

## 2023-04-15 PROCEDURE — 84443 ASSAY THYROID STIM HORMONE: CPT | Performed by: EMERGENCY MEDICINE

## 2023-04-15 PROCEDURE — 80048 BASIC METABOLIC PNL TOTAL CA: CPT | Performed by: EMERGENCY MEDICINE

## 2023-04-15 PROCEDURE — 36415 COLL VENOUS BLD VENIPUNCTURE: CPT | Performed by: EMERGENCY MEDICINE

## 2023-04-15 PROCEDURE — 93010 ELECTROCARDIOGRAM REPORT: CPT | Performed by: EMERGENCY MEDICINE

## 2023-04-15 ASSESSMENT — ACTIVITIES OF DAILY LIVING (ADL): ADLS_ACUITY_SCORE: 37

## 2023-04-15 ASSESSMENT — ENCOUNTER SYMPTOMS
GASTROINTESTINAL NEGATIVE: 1
PSYCHIATRIC NEGATIVE: 1
HEMATOLOGIC/LYMPHATIC NEGATIVE: 1
ENDOCRINE NEGATIVE: 1
MUSCULOSKELETAL NEGATIVE: 1
ALLERGIC/IMMUNOLOGIC NEGATIVE: 1
RESPIRATORY NEGATIVE: 1
CONSTITUTIONAL NEGATIVE: 1
CARDIOVASCULAR NEGATIVE: 1
EYES NEGATIVE: 1

## 2023-04-15 NOTE — LETTER
April 15, 2023      To Whom It May Concern:      Alen Gibbs was seen in our Emergency Department today, 04/15/23.  Please excuse him from missing work due to his evaluation in the emergency department.  Due to need for further work-up and care he is to not return to work until he is cleared by his care team with completion of diagnostic testing and follow-up care as needed.  If he is able to return to work he may require some limitations with his work routine or schedule depending on his symptoms.  This work note is valid until 4/21/2023.    Sincerely,          Tobias Stevenson MD

## 2023-04-16 NOTE — ED NOTES
"Pt reports he was sitting at home when he felt like \"my mind was racing\" \"like how some people would explain a panic attack, but it wasn't that, wewere just eating dinner\". Pts daughter reports witnessing event, reports that pts eyes dilated and began to twitch, pt also lifted both arms and they started to twitch. Daughter reports that pt was very pale, but not diaphoretic, pt reports he felt hot during and after episode. Episode lasted approx. 1 min. Pt reports now he feels fine, just slightly tired and weak, pt also states he feels bloated. Negative BFAST exam.   "

## 2023-04-16 NOTE — ED PROVIDER NOTES
History     Chief Complaint   Patient presents with     Syncope     HPI  Alen Gibbs is a 52 year old male who presents for evaluation after witnessed fainting spell.  On intake he patient reported that he had felt like he was going to pass out his daughter noted that his pupils were dilated and his eyes begin to twitch.  Symptoms were reported to have lasted about a minute with similar episode about 8 to 10 years prior.    Patient's medical records show a history of fracture obesity hypertension gout history of right knee DJD- s/p right knee replacement on 5/6/21    Patient's prescribed medications were reviewed.    On examination patient  was accompanied by spouse Sonia and his daughter Kelsy who witnessed the event.  He had finished dinner and was going to the store to purchase liquid for pumpkin pie when patient felt like he was going to faint and passed out.  He informed his daughter and subsequently passed out and woke up.  He was sitting in the front passenger side.  Kelsy reports father seemed to have little eye movement with upper extremity jerking but no scissoring.  Patient reports no fecal urinary incontinence and no prior history of seizures.  Patient reports he had a similar episode about 10 years ago when he had a fainting spell which was felt to be due to hypovolemia from dehydration.  He had been at a swim meet and did not eat much during the day.  He subsequently ate a large piece of pizza and then fainted after getting up quickly and falling face forward.  He was evaluated November 2019 after an episode of visual disturbance and had reassuring neuroimaging including MRI of the brain and MRA of the head and neck.  As part of his evaluation he had an echo bubble study in July 2020 and had a 48-hour event monitor back in  2/2011 and a Lexiscan stress test in 2/2011.  Patient reports has been feeling well.  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  He has been compliant with his 81 mg aspirin  after knee surgery and his lisinopril reports he takes allopurinol for gout.  He was recently told that he has PVCs in the last 3 to 4 months and has been taking magnesium supple      Allergies:  Allergies   Allergen Reactions     Norco [Hydrocodone-Acetaminophen] Nausea     Simvastatin Other (See Comments)     fasciatus       Problem List:    Patient Active Problem List    Diagnosis Date Noted     Status post total knee replacement, right  05/06/2021     Priority: Medium     COVID-19 ruled out 05/06/2021     Priority: Medium     Right knee DJD 05/06/2021     Priority: Medium     Gout 05/06/2021     Priority: Medium     Dehydration 07/03/2019     Priority: Medium     Tinea corporis 06/07/2019     Priority: Medium     Obesity (BMI 35.0-39.9) with comorbidity (H) 03/01/2019     Priority: Medium     Chondromalacia of patella, right 01/08/2018     Priority: Medium     Essential hypertension with goal blood pressure less than 130/80 05/20/2016     Priority: Medium     Tinea versicolor 01/15/2016     Priority: Medium     CARDIOVASCULAR SCREENING; LDL GOAL LESS THAN 130 07/21/2015     Priority: Medium     Mixed hyperlipidemia 01/25/2010     Priority: Medium        Past Medical History:    Past Medical History:   Diagnosis Date     HTN (hypertension)      Hyperlipidemia      Syncope and collapse        Past Surgical History:    Past Surgical History:   Procedure Laterality Date     ARTHROPLASTY KNEE Right 5/6/2021    Procedure: Total Knee Arthroplasty Right;  Surgeon: Juan Manuel Landin MD;  Location: [facility]     ARTHROTOMY WRIST Right 6/10/2016    Procedure: ARTHROTOMY WRIST;  Surgeon: Toni Ewing MD;  Location: [facility]     BONE MARROW ASPIRATION ONLY       COLONOSCOPY N/A 4/16/2021    Procedure: COLONOSCOPY;  Surgeon: Nito Platt MD;  Location: [facility]       Family History:    Family History   Problem Relation Age of Onset     Breast Cancer Mother      Cardiovascular Father         CHF     Diabetes Father 66      "   Type II     Alzheimer Disease Maternal Grandmother      Respiratory Paternal Grandfather         emphysema     Blood Disease Brother 4        leukemia     Diabetes Daughter         type I     Melanoma No family hx of        Social History:  Marital Status:   [2]  Social History     Tobacco Use     Smoking status: Never     Smokeless tobacco: Never   Vaping Use     Vaping status: Never Used   Substance Use Topics     Alcohol use: Yes     Comment: occ     Drug use: No        Medications:    allopurinol (ZYLOPRIM) 300 MG tablet  aspirin (ASA) 325 MG EC tablet  lisinopril (ZESTRIL) 10 MG tablet  sildenafil (VIAGRA) 25 MG tablet  triamcinolone (KENALOG) 0.1 % external cream          Review of Systems   Constitutional: Negative.    HENT: Negative.    Eyes: Negative.    Respiratory: Negative.    Cardiovascular: Negative.    Gastrointestinal: Negative.    Endocrine: Negative.    Genitourinary: Negative.    Musculoskeletal: Negative.    Skin: Negative.    Allergic/Immunologic: Negative.    Neurological: Positive for syncope.   Hematological: Negative.    Psychiatric/Behavioral: Negative.    All other systems reviewed and are negative.      Physical Exam   BP: (!) 148/94  Pulse: 73  Temp: 97.2  F (36.2  C)  Resp: 18  Height: 177.8 cm (5' 10\")  Weight: 117.5 kg (259 lb)  SpO2: 96 %  Lying Orthostatic BP: 131/98  Lying Orthostatic Pulse: 69 bpm  Sitting Orthostatic BP: 126/93  Sitting Orthostatic Pulse: 75 bpm  Standing Orthostatic BP: 132/92  Standing Orthostatic Pulse: 70 bpm      Physical Exam  Constitutional:       General: He is not in acute distress.     Appearance: Normal appearance. He is not ill-appearing, toxic-appearing or diaphoretic.   HENT:      Head: Normocephalic and atraumatic.      Nose: Nose normal.   Eyes:      Extraocular Movements: Extraocular movements intact.      Pupils: Pupils are equal, round, and reactive to light.   Cardiovascular:      Rate and Rhythm: Normal rate and regular rhythm.      " Heart sounds:      No friction rub. No gallop.   Pulmonary:      Effort: Pulmonary effort is normal. No respiratory distress.      Breath sounds: Normal breath sounds. No stridor. No wheezing, rhonchi or rales.   Chest:      Chest wall: No tenderness.   Musculoskeletal:         General: No swelling, tenderness, deformity or signs of injury.      Cervical back: Normal range of motion and neck supple.      Right lower leg: No edema.      Left lower leg: No edema.   Skin:     Capillary Refill: Capillary refill takes less than 2 seconds.      Coloration: Skin is not jaundiced or pale.      Findings: No bruising, erythema, lesion or rash.   Neurological:      General: No focal deficit present.      Mental Status: He is alert and oriented to person, place, and time.      Cranial Nerves: No cranial nerve deficit.      Sensory: No sensory deficit.      Motor: No weakness.      Coordination: Coordination normal.      Gait: Gait normal.      Deep Tendon Reflexes: Reflexes normal.   Psychiatric:         Mood and Affect: Mood normal.         Behavior: Behavior normal.         Thought Content: Thought content normal.         Judgment: Judgment normal.         ED Course                 Procedures              EKG Interpretation:      Interpreted by Venkata Stevenson MD  Time reviewed:2050  Symptoms at time of EKG: Feeling unwell, syncope  Rhythm: normal sinus   Rate: Normal  Axis: Normal  Ectopy: premature ventricular contractions (unifocal)  Conduction: normal  ST Segments/ T Waves: Non-specific ST-T wave changes  Q Waves: nonspecific  Comparison to prior: When compared to EKG from 11/25/2029-PVCs are more prominent     Clinical Impression: Sinus rhythm with  PVC's        Critical Care time:  none               ED medications: none          ED Vitals:  Vitals:    04/15/23 2103 04/15/23 2133 04/15/23 2205 04/15/23 2242   BP: (!) 140/89 128/89 (!) 133/92 (!) 129/94   Pulse: 76 75 67 75   Resp: 14 14 15 16   Temp:        TempSrc:       SpO2: 96% 96% 97% 94%   Weight:       Height:         ED labs and imaging:  Results for orders placed or performed during the hospital encounter of 04/15/23   Head CT w/o contrast     Status: None    Narrative    EXAM: CT HEAD W/O CONTRAST  LOCATION: [facility]  DATE/TIME: 4/15/2023 9:56 PM CDT    INDICATION: Unresponsive episode. Evaluate for acute intracranial process. Last CT on 02/22/2019.  COMPARISON: 02/22/2019 CT.   TECHNIQUE: Routine CT Head without IV contrast. Multiplanar reformats. Dose reduction techniques were used.    FINDINGS:  INTRACRANIAL CONTENTS: No acute intracranial hemorrhage, extraaxial collection, or mass effect. No evidence of an acute transcortical confluent infarct. Normal parenchymal attenuation. Normal ventricles and sulci for age.     VISUALIZED ORBITS/SINUSES/MASTOIDS: No acute intraorbital finding. Mild mucosal thickening/secretions scattered about the visualized paranasal sinuses. No mastoid or middle ear effusion.     BONES/SOFT TISSUES: No acute calvarial injury or significant scalp hematoma.      Impression    IMPRESSION:  1.  No acute intracranial finding.  2.  No significant change since 2019.   [facility] Draw     Status: None    Narrative    The following orders were created for panel order [facility] Draw.  Procedure                               Abnormality         Status                     ---------                               -----------         ------                     Extra Blue Top Tube[[id number]]                              Final result               Extra Red Top Tube[[id number]]                               Final result               Extra Green Top (Lithium...[[id number]]                      Final result               Extra Purple Top Tube[[id number]]                            Final result                 Please view results for these tests on the individual orders.   Extra Blue Top Tube     Status: None   Result Value  Ref Range    Hold Specimen JIC    Extra Red Top Tube     Status: None   Result Value Ref Range    Hold Specimen JIC    Extra Green Top (Lithium Heparin) Tube     Status: None   Result Value Ref Range    Hold Specimen JIC    Extra Purple Top Tube     Status: None   Result Value Ref Range    Hold Specimen JIC    Basic metabolic panel     Status: Abnormal   Result Value Ref Range    Sodium 139 136 - 145 mmol/L    Potassium 3.8 3.4 - 5.3 mmol/L    Chloride 103 98 - 107 mmol/L    Carbon Dioxide (CO2) 22 22 - 29 mmol/L    Anion Gap 14 7 - 15 mmol/L    Urea Nitrogen 16.0 6.0 - 20.0 mg/dL    Creatinine 1.02 0.67 - 1.17 mg/dL    Calcium 9.6 8.6 - 10.0 mg/dL    Glucose 116 (H) 70 - 99 mg/dL    GFR Estimate 88 >60 mL/min/1.73m2   Magnesium     Status: Normal   Result Value Ref Range    Magnesium 1.9 1.7 - 2.3 mg/dL   TSH with free T4 reflex     Status: Normal   Result Value Ref Range    TSH 2.00 0.30 - 4.20 uIU/mL   CBC with platelets and differential     Status: None   Result Value Ref Range    WBC Count 8.3 4.0 - 11.0 10e3/uL    RBC Count 4.91 4.40 - 5.90 10e6/uL    Hemoglobin 15.0 13.3 - 17.7 g/dL    Hematocrit 44.4 40.0 - 53.0 %    MCV 90 78 - 100 fL    MCH 30.5 26.5 - 33.0 pg    MCHC 33.8 31.5 - 36.5 g/dL    RDW 13.1 10.0 - 15.0 %    Platelet Count 164 150 - 450 10e3/uL    % Neutrophils 56 %    % Lymphocytes 32 %    % Monocytes 8 %    % Eosinophils 3 %    % Basophils 1 %    % Immature Granulocytes 0 %    NRBCs per 100 WBC 0 <1 /100    Absolute Neutrophils 4.7 1.6 - 8.3 10e3/uL    Absolute Lymphocytes 2.6 0.8 - 5.3 10e3/uL    Absolute Monocytes 0.7 0.0 - 1.3 10e3/uL    Absolute Eosinophils 0.3 0.0 - 0.7 10e3/uL    Absolute Basophils 0.1 0.0 - 0.2 10e3/uL    Absolute Immature Granulocytes 0.0 <=0.4 10e3/uL    Absolute NRBCs 0.0 10e3/uL   CBC with platelets differential     Status: None    Narrative    The following orders were created for panel order CBC with platelets differential.  Procedure                                Abnormality         Status                     ---------                               -----------         ------                     CBC with platelets and d...[[id number]]                      Final result                 Please view results for these tests on the individual orders.       Assessments & Plan (with Medical Decision Making)   Assessment Summary and Clinical Impression: 52-year-old male with a history of fainting who presented after witnessed fainting spell with report of dilated eyes and eye twitching lasted about a minute. Episode was reported to have occure about 45 minutes prior to arrival for assessment.  On examination patient was afebrile 96% on room air and stable pressure was 140/94.  Symptoms occurred while he was a front passenger in a car driving to the starter purchase with cream.  He reported he felt like he was going to faint and notified his daughter was driving and subsequently passed out.  His daughter reports some roving eye movements with twitching of his upper extremity but no scissoring.  There is no tongue biting, patient had no palpitations or headache or chest pain and no fecal urinary incontinence.  We discussed possible causes for his symptoms and consider cardiac syncope versus seizure.  With prior work-up in November 2019 and February 2011 and echocardiogram from 7/16/2020 I reviewed his presentation and [facility]  We discussed the possibly of seizure event and plan was for outpatient EEG to be coordinated with his primary care provider.  He remained at baseline and was given precautions for return and discharged with symptoms of uncertain cause with need for further work-up-including consideration of event or Holter monitoring and TTE)  given unresponsive spell with the possibility of cardiac syncope.    ED course and Plan:  Reviewed the medical record.  Patient had an echocardiogram (bubble study) 7/16/2020-which revealed mild  concentric left ventricular hypertrophy with  low normal systolic function there is no thrombus in the left ventricle and mild aortic root dilatation.  EKG on arrival revealed unifocal PVCs that were frequent with normal sinus rhythm.  When compared to EKG from 11/25/2019 PVCs are more pronounced.  He was monitored on telemetry with frequent vital signs.   Patient's work-up was reassuring including normal magnesium and electrolytes.  Normal renal function and hemogram.  He did not report any chest pain or palpitations preceding his symptoms.  With eye movements and upper extremity jerking and twitching I reviewed his presentation with neurology at the [facility].   Spoke with Dr. Fonseca at 9.26pm neurology to review the role of work-up for seizure with differential including cardiac syncope. Patient had a sleep deprived EEG on 2/14/11.  Dr. Rhoades advised head imaging and outpatient EEG with work-up to ensure symptoms are related to cardiac syncope.  Head imaging was reassuring today with no new changes from prior comparison from 2019.  Patient remained at baseline and after long discussion with patient and family he requested outpatient follow-up diagnostic testing to be coordinated with his primary care provider. I sent his primary care provider in epic in basket message to notify of her about ED work-up and consideration for additional diagnostic testing and work-up including Holter or event monitor +/- or TTE and outpatient EEG as suggested by neurology at the [facility].  Reviewed precautions for driving and work and reasons to return for reevaluation.  Patient and family expressed comfort, understanding and agreement with plan of care.      Disclaimer: This note consists of symbols derived from keyboarding, dictation and/or voice recognition software. As a result, there may be errors in the script that have gone undetected. Please consider this when interpreting information found in this chart.  I have  reviewed the nursing notes.    I have reviewed the findings, diagnosis, plan and need for follow up with the patient.           Medical Decision Making  The patient's presentation was of moderate complexity (an acute illness with systemic symptoms).    The patient's evaluation involved:  ordering and/or review of 2 test(s) in this encounter (Diagnostic labs and imaging)    The patient's management necessitated moderate risk (prescription drug management including medications given in the ED).        Discharge Medication List as of 4/15/2023 10:39 PM          Final diagnoses:   Unresponsive episode   PVC's (premature ventricular contractions)       4/15/2023   [facility] EMERGENCY DEPT     Venkata Stevenson MD  04/15/23 [id number]

## 2023-04-16 NOTE — DISCHARGE INSTRUCTIONS
1) Your evaluation today did not reveal the exact cause of your unresponsive spell prior to arrival.  We discussed possible etiologies including cardiac syncope and seizure.  Given your history of PVCs we discussed the need to consider repeat event or Holter monitor from your last assessment and the role of repeat EEG after discussion with neurology at the [facility].    2) pending follow-up evaluation and further work-up we discussed work limitations and restrictions.  I recommend you send your primary care provider message (I have also sent an Epic message for awareness) so orders can be placed for further diagnostic testing as discussed and reviewed.  I would recommend considering an event or Holter monitor for at least 48 hours may be up to 7 days to allow for comparison from the last evaluation in 2011.  Also an outpatient EEG was advised in discussion with neurology at the [facility] to allow for comparison from the last study from 2/14/2011.    3) You appear stable for discharge to home at this time with discussed precautions with driving and activities pending completion of your work-up.

## 2023-04-16 NOTE — ED TRIAGE NOTES
~45 minutes prior to arrival, while seated. Patient felt like he was going to pass out. Patient's daughter reports his pupils got large and eyes started twitching. Episode lasting less than 1 minutes. Reports he has not felt well today. Last syncopal episode was 8-10 years ago. Patient denies pain but reports nausea.      Triage Assessment     Row Name 04/15/23 2021       Triage Assessment (Adult)    Airway WDL WDL       Respiratory WDL    Respiratory WDL WDL       Skin Circulation/Temperature WDL    Skin Circulation/Temperature WDL WDL       Cardiac WDL    Cardiac WDL X;rhythm    Pulse Rate & Regularity radial pulse irregular       Peripheral/Neurovascular WDL    Peripheral Neurovascular WDL WDL       Cognitive/Neuro/Behavioral WDL    Cognitive/Neuro/Behavioral WDL WDL

## 2023-04-17 DIAGNOSIS — R55 SYNCOPE, UNSPECIFIED SYNCOPE TYPE: ICD-10-CM

## 2023-04-17 DIAGNOSIS — R40.20 LOSS OF CONSCIOUSNESS (H): Primary | ICD-10-CM

## 2023-04-18 ENCOUNTER — ANCILLARY PROCEDURE (OUTPATIENT)
Dept: CARDIOLOGY | Facility: CLINIC | Age: 53
End: 2023-04-18
Attending: FAMILY MEDICINE
Payer: COMMERCIAL

## 2023-04-18 DIAGNOSIS — R55 SYNCOPE, UNSPECIFIED SYNCOPE TYPE: Primary | ICD-10-CM

## 2023-04-18 DIAGNOSIS — R40.20 LOSS OF CONSCIOUSNESS (H): ICD-10-CM

## 2023-04-18 DIAGNOSIS — R55 SYNCOPE, UNSPECIFIED SYNCOPE TYPE: ICD-10-CM

## 2023-04-18 LAB — LVEF ECHO: NORMAL

## 2023-04-18 PROCEDURE — 93306 TTE W/DOPPLER COMPLETE: CPT | Performed by: STUDENT IN AN ORGANIZED HEALTH CARE EDUCATION/TRAINING PROGRAM

## 2023-04-18 RX ADMIN — Medication 7 ML: at 11:26

## 2023-04-18 NOTE — PATIENT INSTRUCTIONS
Patient has been prescribed a ZioPatch holter for 7 days.  Patient was instructed regarding the indication, function, care and prompt return of the ZioPatch holter monitor. The monitor, with S/N [id number],  was provided to the patient with instructions regarding care of the skin, electrodes, and monitor, as well as documentation in the patient diary for placement by the patient after an upcoming MRI. Patient demonstrated understanding of this information and agreed to call [employer] with further questions or concerns.

## 2023-04-19 ENCOUNTER — OFFICE VISIT (OUTPATIENT)
Dept: FAMILY MEDICINE | Facility: CLINIC | Age: 53
End: 2023-04-19
Payer: COMMERCIAL

## 2023-04-19 VITALS
OXYGEN SATURATION: 95 % | DIASTOLIC BLOOD PRESSURE: 68 MMHG | BODY MASS INDEX: 37.1 KG/M2 | HEIGHT: 71 IN | SYSTOLIC BLOOD PRESSURE: 112 MMHG | TEMPERATURE: 97.7 F | WEIGHT: 265 LBS | HEART RATE: 80 BPM | RESPIRATION RATE: 16 BRPM

## 2023-04-19 DIAGNOSIS — F41.9 ANXIETY: ICD-10-CM

## 2023-04-19 DIAGNOSIS — E66.01 MORBID OBESITY (H): ICD-10-CM

## 2023-04-19 DIAGNOSIS — R55 SYNCOPE, UNSPECIFIED SYNCOPE TYPE: Primary | ICD-10-CM

## 2023-04-19 PROCEDURE — 99214 OFFICE O/P EST MOD 30 MIN: CPT | Performed by: FAMILY MEDICINE

## 2023-04-19 RX ORDER — LORAZEPAM 0.5 MG/1
.5-1 TABLET ORAL ONCE
Qty: 2 TABLET | Refills: 0 | Status: SHIPPED | OUTPATIENT
Start: 2023-04-19 | End: 2023-04-19

## 2023-04-19 ASSESSMENT — ENCOUNTER SYMPTOMS
ENDOCRINE NEGATIVE: 1
ALLERGIC/IMMUNOLOGIC NEGATIVE: 1
EYES NEGATIVE: 1
GASTROINTESTINAL NEGATIVE: 1
RESPIRATORY NEGATIVE: 1
CONSTITUTIONAL NEGATIVE: 1
PSYCHIATRIC NEGATIVE: 1
CARDIOVASCULAR NEGATIVE: 1
MUSCULOSKELETAL NEGATIVE: 1
HEMATOLOGIC/LYMPHATIC NEGATIVE: 1

## 2023-04-19 ASSESSMENT — PAIN SCALES - GENERAL: PAINLEVEL: NO PAIN (0)

## 2023-04-19 NOTE — PROGRESS NOTES
"Patient is a 52 yr old male here for an ER follow up. He was seen for a syncopal episode that was witnessed by his daughter. He was going out to get something from the store and passed out in the passenger seat. He was unresponsive for about a minute there was report of some jerking /stiffness when he passed out. When he came to he says he was aware of his surroundings , knew his name and the people around him.     This is the second syncope episode in the last 10 years . He had one ten years ago at his daughter's game. This was supposedly because of dehydration .  He had an ECHO done yesterday that was negative for any abnormalities. He has a history of PVCS in the past. He is going to have a Holter monitor also and he is having an MRI to rule out intracranial causes      Assessment & Plan     Syncope, unspecified syncope type  Brain MRI ordered, patient will be notified of results  - MR Brain w/o Contrast; Future  - EEG; Future    Morbid obesity (H)  ongoing    Anxiety  Patient gets anxious with MRI  - LORazepam (ATIVAN) 0.5 MG tablet; Take 1-2 tablets (0.5-1 mg) by mouth once for 1 dose    Review of external notes as documented elsewhere in note  25 minutes spent by me on the date of the encounter doing chart review, review of test results, patient visit, documentation and discussion with family      MED REC REQUIRED  Post Medication Reconciliation Status:       BMI:   Estimated body mass index is 36.96 kg/m  as calculated from the following:    Height as of this encounter: 1.803 m (5' 11\").    Weight as of this encounter: 120.2 kg (265 lb).       FUTURE APPOINTMENTS:       - Follow-up visit in 1-2 weeks or sooner as needed.    Mina Garcia MD  [facility]    Manuelito Lowry is a 52 year old, presenting for the following health issues:  ER F/U (Follow up from the ER on 4-.), New OTC medications to add (He is taking Vitamin D3, Vitamin B12 and Magnesium daily.  He is " taking Potassium daily but doesn't have any at this time.), and Imm/Inj (Discuss if he is due for a pneumonia injection. )        4/19/2023     8:40 AM   Additional Questions   Roomed by Mehnaz Marte CMA     Chief Complaint   Patient presents with     ER F/U     Follow up from the ER on 4-.     New OTC medications to add     He is taking Vitamin D3, Vitamin B12 and Magnesium daily.  He is taking Potassium daily but doesn't have any at this time.     Imm/Inj     Discuss if he is due for a pneumonia injection.        History of Present Illness       Reason for visit:  Syncopal episode    He eats 0-1 servings of fruits and vegetables daily.He consumes 1 sweetened beverage(s) daily.He exercises with enough effort to increase his heart rate 60 or more minutes per day.  He exercises with enough effort to increase his heart rate 6 days per week. He is missing 1 dose(s) of medications per week.       ED/UC Followup:    Facility:  [facility]  Date of visit: 4-  Reason for visit: Syncope, Unresponsive episode, PVC's.  ER NOTE IS COPIED BELOW:  Chief Complaint   Patient presents with     Syncope      HPI  Alen Gibbs is a 52 year old male who presents for evaluation after witnessed fainting spell.  On intake he patient reported that he had felt like he was going to pass out his daughter noted that his pupils were dilated and his eyes begin to twitch.  Symptoms were reported to have lasted about a minute with similar episode about 8 to 10 years prior.     Patient's medical records show a history of fracture obesity hypertension gout history of right knee DJD- s/p right knee replacement on 5/6/21     Patient's prescribed medications were reviewed.     On examination patient  was accompanied by spouse Sonia and his daughter Kelsy who witnessed the event.  He had finished dinner and was going to the store to purchase liquid for pumpkin pie when patient felt like he was going to faint and  "passed out.  He informed his daughter and subsequently passed out and woke up.  He was sitting in the front passenger side.  Kelsy reports father seemed to have little eye movement with upper extremity jerking but no scissoring.  Patient reports no fecal urinary incontinence and no prior history of seizures.  Patient reports he had a similar episode about 10 years ago when he had a fainting spell which was felt to be due to hypovolemia from dehydration.  He had been at a swim meet and did not eat much during the day.  He subsequently ate a large piece of pizza and then fainted after getting up quickly and falling face forward.  He was evaluated November 2019 after an episode of visual disturbance and had reassuring neuroimaging including MRI of the brain and MRA of the head and neck.  As part of his evaluation he had an echo bubble study in July 2020 and had a 48-hour event monitor back in  2/2011 and a Lexiscan stress test in 2/2011.  Patient reports has been feeling well.  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  He has been compliant with his 81 mg aspirin after knee surgery and his lisinopril reports he takes allopurinol for gout.  He was recently told that he has PVCs in the last 3 to 4 months and has been taking magnesium supple       Current Status: He has been feeling well since the ER visit.          Review of Systems   Constitutional: Negative.    HENT: Negative.    Eyes: Negative.    Respiratory: Negative.    Cardiovascular: Negative.    Gastrointestinal: Negative.    Endocrine: Negative.    Genitourinary: Negative.    Musculoskeletal: Negative.    Skin: Negative.    Allergic/Immunologic: Negative.    Neurological: Positive for syncope.   Hematological: Negative.    Psychiatric/Behavioral: Negative.             Objective    /68 (BP Location: Right arm, Patient Position: Chair, Cuff Size: Adult Large)   Pulse 80   Temp 97.7  F (36.5  C) (Tympanic)   Resp 16   Ht 1.803 m (5' 11\")   Wt 120.2 kg (265 " lb)   SpO2 95%   BMI 36.96 kg/m    Body mass index is 36.96 kg/m .  Physical Exam   GENERAL: healthy, alert and no distress  EYES: Eyes grossly normal to inspection, PERRL and conjunctivae and sclerae normal  HENT: ear canals and TM's normal, nose and mouth without ulcers or lesions  NECK: no adenopathy, no asymmetry, masses, or scars and thyroid normal to palpation  RESP: lungs clear to auscultation - no rales, rhonchi or wheezes  CV: regular rate and rhythm, normal S1 S2, no S3 or S4, no murmur, click or rub, no peripheral edema and peripheral pulses strong  ABDOMEN: soft, nontender, no hepatosplenomegaly, no masses and bowel sounds normal  MS: no gross musculoskeletal defects noted, no edema

## 2023-04-20 ENCOUNTER — MYC MEDICAL ADVICE (OUTPATIENT)
Dept: FAMILY MEDICINE | Facility: CLINIC | Age: 53
End: 2023-04-20

## 2023-04-20 ENCOUNTER — HOSPITAL ENCOUNTER (OUTPATIENT)
Dept: NEUROLOGY | Facility: CLINIC | Age: 53
Discharge: HOME | End: 2023-04-20
Attending: FAMILY MEDICINE | Admitting: FAMILY MEDICINE
Payer: COMMERCIAL

## 2023-04-20 DIAGNOSIS — R55 SYNCOPE, UNSPECIFIED SYNCOPE TYPE: Primary | ICD-10-CM

## 2023-04-20 DIAGNOSIS — R55 SYNCOPE, UNSPECIFIED SYNCOPE TYPE: ICD-10-CM

## 2023-04-20 PROCEDURE — 95816 EEG AWAKE AND DROWSY: CPT | Mod: 26 | Performed by: PSYCHIATRY & NEUROLOGY

## 2023-04-20 PROCEDURE — 95816 EEG AWAKE AND DROWSY: CPT

## 2023-04-24 NOTE — TELEPHONE ENCOUNTER
"There is no order for \"open MRI\"order in Epic. I think the order placed is good enough to be faxed to this facility  Patient should please print the order and fax it to the appropriate facility.   "

## 2023-04-26 ENCOUNTER — TRANSFERRED RECORDS (OUTPATIENT)
Dept: HEALTH INFORMATION MANAGEMENT | Facility: CLINIC | Age: 53
End: 2023-04-26
Payer: COMMERCIAL

## 2023-04-26 PROCEDURE — 93242 EXT ECG>48HR<7D RECORDING: CPT | Performed by: INTERNAL MEDICINE

## 2023-04-26 PROCEDURE — 93244 EXT ECG>48HR<7D REV&INTERPJ: CPT | Performed by: INTERNAL MEDICINE

## 2023-04-28 ENCOUNTER — MYC MEDICAL ADVICE (OUTPATIENT)
Dept: FAMILY MEDICINE | Facility: CLINIC | Age: 53
End: 2023-04-28
Payer: COMMERCIAL

## 2023-05-01 ENCOUNTER — VIRTUAL VISIT (OUTPATIENT)
Dept: FAMILY MEDICINE | Facility: CLINIC | Age: 53
End: 2023-05-01
Payer: COMMERCIAL

## 2023-05-01 DIAGNOSIS — R55 SYNCOPE, UNSPECIFIED SYNCOPE TYPE: Primary | ICD-10-CM

## 2023-05-01 DIAGNOSIS — Z11.59 NEED FOR HEPATITIS C SCREENING TEST: ICD-10-CM

## 2023-05-01 DIAGNOSIS — Z11.4 SCREENING FOR HIV (HUMAN IMMUNODEFICIENCY VIRUS): ICD-10-CM

## 2023-05-01 PROCEDURE — 99213 OFFICE O/P EST LOW 20 MIN: CPT | Mod: VID | Performed by: FAMILY MEDICINE

## 2023-05-01 NOTE — PROGRESS NOTES
"Alen is a 52 year old who is being evaluated via a billable video visit.      How would you like to obtain your AVS? MyChart  If the video visit is dropped, the invitation should be resent by: Text to cell phone: [phone]  Will anyone else be joining your video visit? No        Assessment & Plan         Syncope, unspecified syncope type  Unexplained cause. Recommend seeing neurology.   - Adult Neurology  Referral; Future         BMI:   Estimated body mass index is 36.96 kg/m  as calculated from the following:    Height as of 4/19/23: 1.803 m (5' 11\").    Weight as of 4/19/23: 120.2 kg (265 lb).       FUTURE APPOINTMENTS:       - Follow-up visit in one month or sooner as needed.    Mina Garcia MD  [facility]    Subjective   Alen is a 52 year old, presenting for the following health issues:    52 yr old male here for follow up on results . Had an episode of syncope a couple of weeks ago and  He has some tests to investigate this, so far his tests have come back within normal limits . Patient asking for next steps. Since most of his tests have come back within normal limits, recommending a neurology assessment. Patient open to this, referral placed. He is also wanting to go back to work.      Follow Up (CT head 4/15/23, EKG 4/15/23, Echocardiogram 4/18/23, EEG 4/20/23, currently wearing Zio patch )        5/1/2023     8:49 AM   Additional Questions   Roomed by Kalli WINTER   Accompanied by self     History of Present Illness       Reason for visit:  Syncopal episode    He eats 0-1 servings of fruits and vegetables daily.He consumes 1 sweetened beverage(s) daily.He exercises with enough effort to increase his heart rate 60 or more minutes per day.  He exercises with enough effort to increase his heart rate 6 days per week. He is missing 1 dose(s) of medications per week.           Review of Systems   Constitutional: Negative.    HENT: Negative.    Eyes: Negative.  "   Respiratory: Negative.    Cardiovascular: Negative.    Gastrointestinal: Negative.    Endocrine: Negative.    Genitourinary: Negative.    Musculoskeletal: Negative.    Skin: Negative.    Allergic/Immunologic: Negative.    Neurological: Negative.    Hematological: Negative.    Psychiatric/Behavioral: Negative.             Objective           Vitals:  No vitals were obtained today due to virtual visit.    Physical Exam   GENERAL: Healthy, alert and no distress  EYES: Eyes grossly normal to inspection.  No discharge or erythema, or obvious scleral/conjunctival abnormalities.  RESP: No audible wheeze, cough, or visible cyanosis.  No visible retractions or increased work of breathing.    SKIN: Visible skin clear. No significant rash, abnormal pigmentation or lesions.  PSYCH: Mentation appears normal, affect normal/bright, judgement and insight intact, normal speech and appearance well-groomed.            Video-Visit Details    Type of service:  Video Visit     Originating Location (pt. Location): Home  Distant Location (provider location):  On-site  Platform used for Video Visit: Eyal

## 2023-05-01 NOTE — TELEPHONE ENCOUNTER
Kellie Gabriel spoke with Evie requested copy of the MRI brain be faxed over.     Will await results.   Julie Behrendt RN

## 2023-05-02 ENCOUNTER — MYC MEDICAL ADVICE (OUTPATIENT)
Dept: FAMILY MEDICINE | Facility: CLINIC | Age: 53
End: 2023-05-02
Payer: COMMERCIAL

## 2023-05-02 NOTE — LETTER
[facility]  [address] [place] [address]  Phone: [phone]      REPORT OF WORK ABILITY    NOTE TO EMPLOYEE: You must promptly provide a copy of this report to your  employer or worker's compensation insurer, and Qualified Rehabilitation Consultant.    Date: 5/3/2023                     Employee Name: Alen Gibbs         YOB: 1970  Medical Record Number: [id number]   Soc.Sec.No: [id number]  Employer: None                Date of Injury: 4/15/2023  Managed Care Organization / Insurance Company Name: UNKNOWN    Diagnosis: Syncope  Work Related: no     MMI: NO   Permanent Partial Disability(PPD) likely: No    EMPLOYEE IS ABLE TO WORK: unrestricted as of 05/04/2023 - Full shift     RESTRICTIONS IF ANY:     Stand:  Frequently (4-6 hours)  Sit:  Frequently (4-6 hours)  Walk: Frequently (4-6 hours)  Lift, carry no more than:  10 - 20 lb.Occasionally (2-4 hours)  Push/Pull:  No restrictionsFull time   Shoulder/Elbow:  Not applicable  Hand/Wrist:  Not applicable  Ladder/Stair climb:  Occasionally (2-4 hours)  Bend:  Occasionally (2-4 hours)  Stoop:  Occasionally (2-4 hours)  Twist:  Occasionally (2-4 hours)  Reach Above Shoulders:  Occasionally (2-4 hours)    OTHER RESTRICTIONS: as above    TREATMENT PLAN/NOTES: will be seeing specialist in a few weeks to months.      Mina Garcia MD /

## 2023-05-03 NOTE — TELEPHONE ENCOUNTER
Faxed to number provided. Sent patient a my chart message that we faxed the letter also. Kiya TRUONG RN

## 2023-05-03 NOTE — TELEPHONE ENCOUNTER
Dr. Garcia,    Patient sends my chart message with who the workability form can be sent to and the fax number. I do not see a workability form in chart. Kiya TRUONG RN

## 2023-05-07 ASSESSMENT — ENCOUNTER SYMPTOMS
MUSCULOSKELETAL NEGATIVE: 1
ENDOCRINE NEGATIVE: 1
RESPIRATORY NEGATIVE: 1
NEUROLOGICAL NEGATIVE: 1
HEMATOLOGIC/LYMPHATIC NEGATIVE: 1
CARDIOVASCULAR NEGATIVE: 1
PSYCHIATRIC NEGATIVE: 1
ALLERGIC/IMMUNOLOGIC NEGATIVE: 1
CONSTITUTIONAL NEGATIVE: 1
EYES NEGATIVE: 1
GASTROINTESTINAL NEGATIVE: 1

## 2023-06-06 ENCOUNTER — OFFICE VISIT (OUTPATIENT)
Dept: FAMILY MEDICINE | Facility: CLINIC | Age: 53
End: 2023-06-06
Payer: COMMERCIAL

## 2023-06-06 VITALS
HEIGHT: 71 IN | SYSTOLIC BLOOD PRESSURE: 100 MMHG | BODY MASS INDEX: 37.52 KG/M2 | DIASTOLIC BLOOD PRESSURE: 70 MMHG | TEMPERATURE: 98 F | OXYGEN SATURATION: 95 % | RESPIRATION RATE: 16 BRPM | HEART RATE: 63 BPM | WEIGHT: 268 LBS

## 2023-06-06 DIAGNOSIS — R55 SYNCOPE, UNSPECIFIED SYNCOPE TYPE: Primary | ICD-10-CM

## 2023-06-06 PROCEDURE — 99213 OFFICE O/P EST LOW 20 MIN: CPT | Performed by: FAMILY MEDICINE

## 2023-06-06 ASSESSMENT — PAIN SCALES - GENERAL: PAINLEVEL: MILD PAIN (3)

## 2023-06-06 NOTE — PROGRESS NOTES
"  Assessment & Plan     (R55) Syncope, unspecified syncope type  (primary encounter diagnosis)  Comment: etiology still unknown , tests have been normal so far  Plan: Follow up with neurology as planned.           BMI:   Estimated body mass index is 37.38 kg/m  as calculated from the following:    Height as of this encounter: 1.803 m (5' 11\").    Weight as of this encounter: 121.6 kg (268 lb).       FUTURE APPOINTMENTS:       - Follow-up visit as needed.    Mina Garcia MD  [facility]    Manuelito Lowry is a 52 year old, presenting for the following health issues:    Patient comes in for a follow up on tests he had done for syncope. So far all his tests have come back normal. He has fortunately not had any other episode since the incident happened over a month ago . He has a neurology appointment but it's not till Dec. We went over his Holter monitor and the plan is to consult with cardiology to see if there is any added test to do. He feels well overall.       Follow Up (Follow up on the testing he done for they syncope episode.  CT scan of the head was done on 4-15. EEG was done on 4-20, MRI Cardiac was done on 4-26.) and Imm/Inj (Discuss if he is due for a pneumonia injection. )        6/6/2023     8:31 AM   Additional Questions   Roomed by Mehnaz Marte CMA     [facility]     Chief Complaint   Patient presents with     Follow Up     Follow up on the testing he done for they syncope episode.  CT scan of the head was done on 4-15. EEG was done on 4-20, MRI Cardiac was done on 4-26.     Imm/Inj     Discuss if he is due for a pneumonia injection.                Review of Systems       Constitutional, HEENT, cardiovascular, pulmonary, gi and gu systems are negative, except as otherwise noted.  Objective    /70 (BP Location: Left arm, Patient Position: Chair, Cuff Size: Adult Large)   Pulse 63   Temp 98  F (36.7  C) (Tympanic)   Resp 16   Ht 1.803 m (5' 11\")   Wt 121.6 kg " (268 lb)   SpO2 95%   BMI 37.38 kg/m    Body mass index is 37.38 kg/m .  Physical Exam   GENERAL: healthy, alert and no distress  NECK: no adenopathy, no asymmetry, masses, or scars and thyroid normal to palpation  RESP: lungs clear to auscultation - no rales, rhonchi or wheezes  CV: regular rate and rhythm, normal S1 S2, no S3 or S4, no murmur, click or rub, no peripheral edema and peripheral pulses strong  MS: no gross musculoskeletal defects noted, no edema

## 2023-06-22 ENCOUNTER — OFFICE VISIT (OUTPATIENT)
Dept: NEUROLOGY | Facility: CLINIC | Age: 53
End: 2023-06-22
Attending: FAMILY MEDICINE
Payer: COMMERCIAL

## 2023-06-22 VITALS
BODY MASS INDEX: 37.66 KG/M2 | HEART RATE: 78 BPM | SYSTOLIC BLOOD PRESSURE: 126 MMHG | DIASTOLIC BLOOD PRESSURE: 67 MMHG | WEIGHT: 270 LBS

## 2023-06-22 DIAGNOSIS — R55 SYNCOPE, UNSPECIFIED SYNCOPE TYPE: Primary | ICD-10-CM

## 2023-06-22 PROCEDURE — 99205 OFFICE O/P NEW HI 60 MIN: CPT | Performed by: PSYCHIATRY & NEUROLOGY

## 2023-06-22 NOTE — NURSING NOTE
"Alen Gibbs is a 52 year old male who presents for:  Chief Complaint   Patient presents with     Syncope     Syncope, unspecified syncope type, referred by Mina Garcia MD        Initial Vitals:  /67   Pulse 78   Wt 122.5 kg (270 lb)   BMI 37.66 kg/m   Estimated body mass index is 37.66 kg/m  as calculated from the following:    Height as of 6/6/23: 1.803 m (5' 11\").    Weight as of this encounter: 122.5 kg (270 lb).. Body surface area is 2.48 meters squared. BP completed using cuff size: wrist cuff    Lester V. Felipe  "

## 2023-06-22 NOTE — PROGRESS NOTES
INITIAL NEUROLOGY CONSULTATION    DATE OF VISIT: 6/22/2023  MRN: [id number]  PATIENT NAME: Alen Gibbs  YOB: 1970    REFERRING PROVIDER: Mina Garcia    Chief Complaint   Patient presents with     Syncope     Syncope, unspecified syncope type, referred by Mina Garcia MD       SUBJECTIVE:                                                      HPI:   Alen Gibbs is a 52 year old male whom I have been asked by Dr. Garcia to see in consultation for one episode of syncope. He has had an extensive work-up for the spell.    He was evaluated in the [facility] emergency room after the event on 4.15.23. Per Dr. Lundberg's note: He presented after fainting.  His daughter reported that his pupils were dilated and his eyes began to twitch.  He was in the car, seated in the passenger seat.  He felt like he was going to faint before the event occurred.  There is mention of some upper extremity jerking.  No incontinence.  No prior history of seizures.  This occurred after dinner, at which time he had had a taco salad.  It was noted that he had a similar episode 10 years prior, where he fainted after eating a piece of pizza at a swim meet.  This was described as fainting after he got up quickly.  The previous episode was ultimately felt to be due to hypovolemia due to dehydration.  Alen also underwent brain imaging and an MRA in 2019 after an episode of visual disturbance.  These tests were unremarkable.  He had an echocardiogram in July 2020 and 48-hour event monitor in 2011 as well as a lexicon stress test that year.  He has been on a daily baby aspirin.  He takes lisinopril for his blood pressure.  EKG in the emergency room after the more recent event showed sinus rhythm with PVCs.  Head CT was unremarkable.  Glucose was 116.  Otherwise normal BMP, TSH, magnesium and CBC.  Subsequent outpatient EEG was normal.  Exam was reportedly normal.  He also had a week long cardiac  monitor completed and this showed PVCs    There is a neurology note by Dr. Spangler from 2011 that indicates a little bit different story of a syncopal episode.  This event reportedly occurred at home.    Alen tells me that the episode that occurred 10 years ago felt very much like the recent one.  He has not had any spells since April.    He says he felt a feeling of dread and then he woke up a minute later. His daughter witnessed it, his hands went up. Felt normal right away when he woke up.  He says in fact that he tried to make an excuse for his daughter that he was just sleeping.  His wife is on speaker phone and says that the daughter also mentioned his head slumping forward.  He knows that he has PVCs, and usually can feel these when they happen.    Alen thinks that he may have had meningitis as a child.  He says he remembers that around age 9-10 his parents brought him to the emergency room for severe headache.  It does not sound like any head imaging was done at that time.    From Alen's standpoint, he remembers seeing neurology and that they thought may be fullness and led to vagal nerve stimulation and the event in 2011 was due to this.  He mentions that dehydration was also felt to have played a role.    No family history of seizures.  He did have some mild concussions as a child but no hospitalizations for this.      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  He was also in the .    Alen denies weakness.  He does have some persistent numbness in the left pinky toe.  Occasional sinus headaches.    Past Medical History:   Diagnosis Date     HTN (hypertension)      Hyperlipidemia      Syncope and collapse      Past Surgical History:   Procedure Laterality Date     ARTHROPLASTY KNEE Right 5/6/2021    Procedure: Total Knee Arthroplasty Right;  Surgeon: Juan Manuel Landin MD;  Location: [facility]     ARTHROTOMY WRIST Right 6/10/2016    Procedure: ARTHROTOMY WRIST;  Surgeon:  Toni Ewing MD;  Location: [facility] OR     BONE MARROW ASPIRATION ONLY       COLONOSCOPY N/A 4/16/2021    Procedure: COLONOSCOPY;  Surgeon: Nito Platt MD;  Location: [facility] GI       allopurinol (ZYLOPRIM) 300 MG tablet, Take 1 tablet (300 mg) by mouth daily  lisinopril (ZESTRIL) 10 MG tablet, Take 1 tablet (10 mg) by mouth daily  sildenafil (VIAGRA) 25 MG tablet, Take 1-2 tablets (25-50 mg) by mouth daily as needed (erectile dysfunction)    No current facility-administered medications on file prior to visit.    Allergies   Allergen Reactions     Norco [Hydrocodone-Acetaminophen] Nausea     Simvastatin Other (See Comments)     fasciatus        Problem (# of Occurrences) Relation (Name,Age of Onset)    Alzheimer Disease (1) Maternal Grandmother    Blood Disease (1) Brother (4): leukemia    Cardiovascular (1) Father: CHF    Diabetes (2) Father (66): Type II, Daughter: type I    Respiratory (1) Paternal Grandfather: emphysema    Breast Cancer (1) Mother       Negative family history of: Melanoma        Social History     Tobacco Use     Smoking status: Never     Passive exposure: Never     Smokeless tobacco: Never   Vaping Use     Vaping Use: Never used   Substance Use Topics     Alcohol use: Yes     Comment: occ     Drug use: No       REVIEW OF SYSTEMS:                                                      10-point review of systems is negative except as mentioned above in HPI.     EXAM:                                                      Physical Exam:   Vitals: /67   Pulse 78   Wt 122.5 kg (270 lb)   BMI 37.66 kg/m    BMI= Body mass index is 37.66 kg/m .  GENERAL: NAD.  HEENT: NC/AT.   CV: RRR. S1, S2.   NECK: No bruits.  PULM: Non-labored breathing.   Neurologic:  MENTAL STATUS: Alert, attentive. Speech is fluent. Normal comprehension. Normal concentration. Adequate fund of knowledge.   CRANIAL NERVES: Discs flat. Visual fields intact to confrontation. Pupils equally, round and reactive to light. Facial  sensation and movement normal. EOM full. Hearing intact to conversation. Trapezius strength intact. Palate moves symmetrically. Tongue midline.  MOTOR: 5/5 in proximal and distal muscle groups of upper and lower extremities. Tone and bulk normal.   DTRs: Intact and symmetric in biceps, BR, patellae.  Babinski down-going bilaterally.   SENSATION: Normal light touch and pinprick except for decrease sensation of the left pinky toe compared to great toe. Intact proprioception at great toes. Vibration: Normal at both ankles.   COORDINATION: Normal finger nose finger. Finger tapping normal. Knee heel shin normal.  STATION AND GAIT: Romberg negative.  Casual gait is normal.  Right hand-dominant.    Relevant Data:  Electroencephalogram (2011):  CONCLUSION:  This is a normal awake and asleep EEG.  The small sharp   spikes are benign epileptiform transients of sleep (BETS).  Small   sharp spikes occur in light sleep in stage I and stage II of non-   rapid eye movement (NREM) sleep.     Electroencephalogram (4.20.23):  IMPRESSION: This outpatient EEG study is normal during wakefulness and drowsiness without any interictal epileptiform discharges, electrographic or clinical seizures, and paroxysmal behavioral events.  Please note that normal EEG does not rule out the diagnosis of epilepsy.  Clinical correlation is recommended.    Head CT (4.15.23):  FINDINGS:  INTRACRANIAL CONTENTS: No acute intracranial hemorrhage, extraaxial collection, or mass effect. No evidence of an acute transcortical confluent infarct. Normal parenchymal attenuation. Normal ventricles and sulci for age.      VISUALIZED ORBITS/SINUSES/MASTOIDS: No acute intraorbital finding. Mild mucosal thickening/secretions scattered about the visualized paranasal sinuses. No mastoid or middle ear effusion.      BONES/SOFT TISSUES: No acute calvarial injury or significant scalp hematoma.                                                                   IMPRESSION:  1.   No acute intracranial finding.  2.  No significant change since 2019.    MRA Head and Neck (11.25.19):  FINDINGS: The distal internal carotid arteries, basilar artery, and  proximal anterior, middle, and posterior cerebral arteries are patent.  There is no evidence for any large vessel occlusion or stenosis. There  is no evidence for a saccular aneurysm.                                                                   IMPRESSION: Negative MR angiography of the [place].     FINDINGS: The brachiocephalic vessels are patent off the arch. The  carotid and vertebral systems were well visualized. The carotid  bifurcations are widely patent. There is no evidence for a stenosis or  dissection.                                                                     IMPRESSION: Negative MR angiography of the neck without and with  Contrast.    Echocardiogram (4.18.23):  Interpretation Summary     No structural cause identified for syncope.  Global and regional left ventricular function is normal with an EF of 55-60%.  Global right ventricular function is normal. The right ventricle is normal  size.  No significant valvular abnormalities.  This study was compared with the study from 07/16/2020. No significant changes  noted.  ______________________________________________________________________________    Imaging including outside brain MRI from April 2023 reviewed independently by me.  The [facility] brain MRI (4.26.23) is normal.    ASSESSMENT and PLAN:                                                      Assessment:     ICD-10-CM    1. Syncope, unspecified syncope type  R55 Adult Neurology  Referral          Mr. Gibbs is a pleasant 52-year-old man here for evaluation regarding syncope.  He describes to similar events,  by about 10 years.  The symptom of premonition/aura does leave some concern for seizure.  We discussed the results of his work-up thus far without any clear cardiac or EEG findings.  I  suggest updated head and neck vessel imaging with a CTA.  Could be vascular, he does have hypertension.  I still cannot completely rule out seizure but this is difficult given that the spells are few and far between.  We could do an ambulatory EEG to see if we capture any evidence of propensity for seizure.  He would like to think about this.  I said that we could also do additional cardiac monitoring for arrhythmias going forward.  This still could be vasovagal syncope.    Of note, after our visit I had a chance to review Alen's chart further.  It sounds like there may have been a third spell of syncope in his history.    Low threshold for a trial of an antiepileptic medication if he has another similar event.    Plan:  -- CTA head/neck.  We will notify you of the results.  -- If you have another episode/aura, please let us know. I would push for the ambulatory electroencephalogram if there is recurrence.   -- Additional cardiac monitoring could also be considered.   -- We will follow-up as needed, pending test results.    Total Time: 60 minutes were spent with the patient and in chart review/documentation (as described above in Assessment and Plan) /coordinating the care on date of service.    Zhane Herman MD  Neurology    CC: Mina Garcia MD    Dragon software used in the dictation of this note.

## 2023-06-22 NOTE — LETTER
6/22/2023         RE: Alen Gibbs  [address]   [place] [address]        Dear Colleague,    Thank you for referring your patient, Alen Gibbs, to the [facility] [place]. Please see a copy of my visit note below.    INITIAL NEUROLOGY CONSULTATION    DATE OF VISIT: 6/22/2023  MRN: [id number]  PATIENT NAME: Alen Gibbs  YOB: 1970    REFERRING PROVIDER: Mina Garcia    Chief Complaint   Patient presents with     Syncope     Syncope, unspecified syncope type, referred by Mina Garcia MD       SUBJECTIVE:                                                      HPI:   Alen Gibbs is a 52 year old male whom I have been asked by Dr. Garcia to see in consultation for one episode of syncope. He has had an extensive work-up for the spell.    He was evaluated in the [facility] emergency room after the event on 4.15.23. Per Dr. Lundberg's note: He presented after fainting.  His daughter reported that his pupils were dilated and his eyes began to twitch.  He was in the car, seated in the passenger seat.  He felt like he was going to faint before the event occurred.  There is mention of some upper extremity jerking.  No incontinence.  No prior history of seizures.  This occurred after dinner, at which time he had had a taco salad.  It was noted that he had a similar episode 10 years prior, where he fainted after eating a piece of pizza at a swim meet.  This was described as fainting after he got up quickly.  The previous episode was ultimately felt to be due to hypovolemia due to dehydration.  Alen also underwent brain imaging and an MRA in 2019 after an episode of visual disturbance.  These tests were unremarkable.  He had an echocardiogram in July 2020 and 48-hour event monitor in 2011 as well as a lexicon stress test that year.  He has been on a daily baby aspirin.  He takes lisinopril for his blood pressure.  EKG in the  emergency room after the more recent event showed sinus rhythm with PVCs.  Head CT was unremarkable.  Glucose was 116.  Otherwise normal BMP, TSH, magnesium and CBC.  Subsequent outpatient EEG was normal.  Exam was reportedly normal.  He also had a week long cardiac monitor completed and this showed PVCs    There is a neurology note by Dr. Spangler from 2011 that indicates a little bit different story of a syncopal episode.  This event reportedly occurred at home.    Alen tells me that the episode that occurred 10 years ago felt very much like the recent one.  He has not had any spells since April.    He says he felt a feeling of dread and then he woke up a minute later. His daughter witnessed it, his hands went up. Felt normal right away when he woke up.  He says in fact that he tried to make an excuse for his daughter that he was just sleeping.  His wife is on speaker phone and says that the daughter also mentioned his head slumping forward.  He knows that he has PVCs, and usually can feel these when they happen.    Alen thinks that he may have had meningitis as a child.  He says he remembers that around age 9-10 his parents brought him to the emergency room for severe headache.  It does not sound like any head imaging was done at that time.    From Alen's standpoint, he remembers seeing neurology and that they thought may be fullness and led to vagal nerve stimulation and the event in 2011 was due to this.  He mentions that dehydration was also felt to have played a role.    No family history of seizures.  He did have some mild concussions as a child but no hospitalizations for this.      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    Alen denies weakness.  He does have some persistent numbness in the left pinky toe.  Occasional sinus headaches.    Past Medical History:   Diagnosis Date     HTN (hypertension)      Hyperlipidemia      Syncope and collapse       Past Surgical History:   Procedure Laterality Date     ARTHROPLASTY KNEE Right 5/6/2021    Procedure: Total Knee Arthroplasty Right;  Surgeon: Juan Manuel Landin MD;  Location: [facility]     ARTHROTOMY WRIST Right 6/10/2016    Procedure: ARTHROTOMY WRIST;  Surgeon: Toni Ewing MD;  Location: [facility]     BONE MARROW ASPIRATION ONLY       COLONOSCOPY N/A 4/16/2021    Procedure: COLONOSCOPY;  Surgeon: Nito Platt MD;  Location: [facility]       allopurinol (ZYLOPRIM) 300 MG tablet, Take 1 tablet (300 mg) by mouth daily  lisinopril (ZESTRIL) 10 MG tablet, Take 1 tablet (10 mg) by mouth daily  sildenafil (VIAGRA) 25 MG tablet, Take 1-2 tablets (25-50 mg) by mouth daily as needed (erectile dysfunction)    No current facility-administered medications on file prior to visit.    Allergies   Allergen Reactions     Norco [Hydrocodone-Acetaminophen] Nausea     Simvastatin Other (See Comments)     fasciatus        Problem (# of Occurrences) Relation (Name,Age of Onset)    Alzheimer Disease (1) Maternal Grandmother    Blood Disease (1) Brother (4): leukemia    Cardiovascular (1) Father: CHF    Diabetes (2) Father (66): Type II, Daughter: type I    Respiratory (1) Paternal Grandfather: emphysema    Breast Cancer (1) Mother       Negative family history of: Melanoma        Social History     Tobacco Use     Smoking status: Never     Passive exposure: Never     Smokeless tobacco: Never   Vaping Use     Vaping Use: Never used   Substance Use Topics     Alcohol use: Yes     Comment: occ     Drug use: No       REVIEW OF SYSTEMS:                                                      10-point review of systems is negative except as mentioned above in HPI.     EXAM:                                                      Physical Exam:   Vitals: /67   Pulse 78   Wt 122.5 kg (270 lb)   BMI 37.66 kg/m    BMI= Body mass index is 37.66 kg/m .  GENERAL: NAD.  HEENT: NC/AT.   CV: RRR. S1, S2.   NECK: No bruits.  PULM: Non-labored  breathing.   Neurologic:  MENTAL STATUS: Alert, attentive. Speech is fluent. Normal comprehension. Normal concentration. Adequate fund of knowledge.   CRANIAL NERVES: Discs flat. Visual fields intact to confrontation. Pupils equally, round and reactive to light. Facial sensation and movement normal. EOM full. Hearing intact to conversation. Trapezius strength intact. Palate moves symmetrically. Tongue midline.  MOTOR: 5/5 in proximal and distal muscle groups of upper and lower extremities. Tone and bulk normal.   DTRs: Intact and symmetric in biceps, BR, patellae.  Babinski down-going bilaterally.   SENSATION: Normal light touch and pinprick except for decrease sensation of the left pinky toe compared to great toe. Intact proprioception at great toes. Vibration: Normal at both ankles.   COORDINATION: Normal finger nose finger. Finger tapping normal. Knee heel shin normal.  STATION AND GAIT: Romberg negative.  Casual gait is normal.  Right hand-dominant.    Relevant Data:  Electroencephalogram (2011):  CONCLUSION:  This is a normal awake and asleep EEG.  The small sharp   spikes are benign epileptiform transients of sleep (BETS).  Small   sharp spikes occur in light sleep in stage I and stage II of non-   rapid eye movement (NREM) sleep.     Electroencephalogram (4.20.23):  IMPRESSION: This outpatient EEG study is normal during wakefulness and drowsiness without any interictal epileptiform discharges, electrographic or clinical seizures, and paroxysmal behavioral events.  Please note that normal EEG does not rule out the diagnosis of epilepsy.  Clinical correlation is recommended.    Head CT (4.15.23):  FINDINGS:  INTRACRANIAL CONTENTS: No acute intracranial hemorrhage, extraaxial collection, or mass effect. No evidence of an acute transcortical confluent infarct. Normal parenchymal attenuation. Normal ventricles and sulci for age.      VISUALIZED ORBITS/SINUSES/MASTOIDS: No acute intraorbital finding. Mild mucosal  thickening/secretions scattered about the visualized paranasal sinuses. No mastoid or middle ear effusion.      BONES/SOFT TISSUES: No acute calvarial injury or significant scalp hematoma.                                                                   IMPRESSION:  1.  No acute intracranial finding.  2.  No significant change since 2019.    MRA Head and Neck (11.25.19):  FINDINGS: The distal internal carotid arteries, basilar artery, and  proximal anterior, middle, and posterior cerebral arteries are patent.  There is no evidence for any large vessel occlusion or stenosis. There  is no evidence for a saccular aneurysm.                                                                   IMPRESSION: Negative MR angiography of the [place] of [place].     FINDINGS: The brachiocephalic vessels are patent off the arch. The  carotid and vertebral systems were well visualized. The carotid  bifurcations are widely patent. There is no evidence for a stenosis or  dissection.                                                                     IMPRESSION: Negative MR angiography of the neck without and with  Contrast.    Echocardiogram (4.18.23):  Interpretation Summary     No structural cause identified for syncope.  Global and regional left ventricular function is normal with an EF of 55-60%.  Global right ventricular function is normal. The right ventricle is normal  size.  No significant valvular abnormalities.  This study was compared with the study from 07/16/2020. No significant changes  noted.  ______________________________________________________________________________    Imaging including outside brain MRI from April 2023 reviewed independently by me.  The [facility] brain MRI (4.26.23) is normal.    ASSESSMENT and PLAN:                                                      Assessment:     ICD-10-CM    1. Syncope, unspecified syncope type  R55 Adult Neurology  Referral          Mr. Gibbs is a pleasant 52-year-old  man here for evaluation regarding syncope.  He describes to similar events,  by about 10 years.  The symptom of premonition/aura does leave some concern for seizure.  We discussed the results of his work-up thus far without any clear cardiac or EEG findings.  I suggest updated head and neck vessel imaging with a CTA.  Could be vascular, he does have hypertension.  I still cannot completely rule out seizure but this is difficult given that the spells are few and far between.  We could do an ambulatory EEG to see if we capture any evidence of propensity for seizure.  He would like to think about this.  I said that we could also do additional cardiac monitoring for arrhythmias going forward.  This still could be vasovagal syncope.    Of note, after our visit I had a chance to review Alen's chart further.  It sounds like there may have been a third spell of syncope in his history.    Low threshold for a trial of an antiepileptic medication if he has another similar event.    Plan:  -- CTA head/neck.  We will notify you of the results.  -- If you have another episode/aura, please let us know. I would push for the ambulatory electroencephalogram if there is recurrence.   -- Additional cardiac monitoring could also be considered.   -- We will follow-up as needed, pending test results.    Total Time: 60 minutes were spent with the patient and in chart review/documentation (as described above in Assessment and Plan) /coordinating the care on date of service.    Zhane Herman MD  Neurology    CC: Mina Garcia MD    Dragon software used in the dictation of this note.        Again, thank you for allowing me to participate in the care of your patient.        Sincerely,        Zhane Herman MD     no

## 2023-06-22 NOTE — PATIENT INSTRUCTIONS
Plan:  -- CTA head/neck.  We will notify you of the results.  -- If you have another episode/aura, please let us know. I would push for the ambulatory electroencephalogram if there is recurrence.   -- Additional cardiac monitoring could also be considered.   -- We will follow-up as needed, pending test results.

## 2023-06-30 ENCOUNTER — HOSPITAL ENCOUNTER (OUTPATIENT)
Dept: CT IMAGING | Facility: CLINIC | Age: 53
Discharge: HOME | End: 2023-06-30
Attending: PSYCHIATRY & NEUROLOGY | Admitting: PSYCHIATRY & NEUROLOGY
Payer: COMMERCIAL

## 2023-06-30 DIAGNOSIS — R55 SYNCOPE, UNSPECIFIED SYNCOPE TYPE: ICD-10-CM

## 2023-06-30 PROCEDURE — 250N000011 HC RX IP 250 OP 636: Performed by: RADIOLOGY

## 2023-06-30 PROCEDURE — 250N000009 HC RX 250: Performed by: RADIOLOGY

## 2023-06-30 PROCEDURE — 70496 CT ANGIOGRAPHY HEAD: CPT

## 2023-06-30 PROCEDURE — 70498 CT ANGIOGRAPHY NECK: CPT

## 2023-06-30 RX ORDER — IOPAMIDOL 755 MG/ML
68 INJECTION, SOLUTION INTRAVASCULAR ONCE
Status: COMPLETED | OUTPATIENT
Start: 2023-06-30 | End: 2023-06-30

## 2023-06-30 RX ADMIN — IOPAMIDOL 68 ML: 755 INJECTION, SOLUTION INTRAVENOUS at 08:04

## 2023-06-30 RX ADMIN — SODIUM CHLORIDE 100 ML: 9 INJECTION, SOLUTION INTRAVENOUS at 08:04

## 2023-07-01 ENCOUNTER — HEALTH MAINTENANCE LETTER (OUTPATIENT)
Age: 53
End: 2023-07-01

## 2023-07-10 ENCOUNTER — MYC MEDICAL ADVICE (OUTPATIENT)
Dept: NEUROLOGY | Facility: CLINIC | Age: 53
End: 2023-07-10
Payer: COMMERCIAL

## 2023-07-10 NOTE — LETTER
July 17, 2023      Alen Gibbs  [address] [address] [facility]   [place] [address]        Dear ,      We are writing to inform you of your test results.       Your vessel imaging is normal.      If you have any questions or concerns, please call the clinic at the number listed above.       Sincerely,      Dr. Zhane Herman MD

## 2023-08-26 DIAGNOSIS — I10 ESSENTIAL HYPERTENSION WITH GOAL BLOOD PRESSURE LESS THAN 130/80: ICD-10-CM

## 2023-08-26 DIAGNOSIS — M10.9 ACUTE GOUTY ARTHRITIS: ICD-10-CM

## 2023-08-28 RX ORDER — LISINOPRIL 10 MG/1
10 TABLET ORAL DAILY
Qty: 90 TABLET | Refills: 3 | Status: SHIPPED | OUTPATIENT
Start: 2023-08-28 | End: 2024-10-03

## 2023-08-28 RX ORDER — ALLOPURINOL 300 MG/1
1 TABLET ORAL DAILY
Qty: 90 TABLET | Refills: 3 | Status: SHIPPED | OUTPATIENT
Start: 2023-08-28

## 2023-08-28 NOTE — TELEPHONE ENCOUNTER
"Requested Prescriptions   Pending Prescriptions Disp Refills    lisinopril (ZESTRIL) 10 MG tablet [Pharmacy Med Name: LISINOPRIL 10MG TABS] 90 tablet 3     Sig: TAKE 1 TABLET (10 MG) BY MOUTH DAILY       ACE Inhibitors (Including Combos) Protocol Passed - 8/26/2023 12:34 PM        Passed - Blood pressure under 140/90 in past 12 months     BP Readings from Last 3 Encounters:   06/22/23 126/67   06/06/23 100/70   04/19/23 112/68                 Passed - Recent (12 mo) or future (30 days) visit within the authorizing provider's specialty     Patient has had an office visit with the authorizing provider or a provider within the authorizing providers department within the previous 12 mos or has a future within next 30 days. See \"Patient Info\" tab in inbasket, or \"Choose Columns\" in Meds & Orders section of the refill encounter.              Passed - Medication is active on med list        Passed - Patient is age 18 or older        Passed - Normal serum creatinine on file in past 12 months     Recent Labs   Lab Test 04/15/23  2049   CR 1.02       Ok to refill medication if creatinine is low          Passed - Normal serum potassium on file in past 12 months     Recent Labs   Lab Test 04/15/23  2049   POTASSIUM 3.8               allopurinol (ZYLOPRIM) 300 MG tablet [Pharmacy Med Name: ALLOPURINOL 300MG TABS] 90 tablet 3     Sig: TAKE 1 TABLET (300 MG) BY MOUTH DAILY       Gout Agents Protocol Failed - 8/26/2023 12:34 PM        Failed - ALT on file in past 12 months     Recent Labs   Lab Test 11/25/19  1330   ALT 52             Failed - Has Uric Acid on file in past 12 months and value is less than 6     Recent Labs   Lab Test 05/10/17  1239   URIC 8.0*     If level is 6mg/dL or greater, ok to refill one time and refer to provider.           Passed - CBC on file in past 12 months     Recent Labs   Lab Test 04/15/23  2049   WBC 8.3   RBC 4.91   HGB 15.0   HCT 44.4                    Passed - Recent (12 mo) or future " "(30 days) visit within the authorizing provider's specialty     Patient has had an office visit with the authorizing provider or a provider within the authorizing providers department within the previous 12 mos or has a future within next 30 days. See \"Patient Info\" tab in inbasket, or \"Choose Columns\" in Meds & Orders section of the refill encounter.              Passed - Medication is active on med list        Passed - Patient is age 18 or older        Passed - Normal serum creatinine on file in the past 12 months     Recent Labs   Lab Test 04/15/23  2049   CR 1.02       Ok to refill medication if creatinine is low               "

## 2023-12-11 ENCOUNTER — TELEPHONE (OUTPATIENT)
Dept: FAMILY MEDICINE | Facility: CLINIC | Age: 53
End: 2023-12-11
Payer: COMMERCIAL

## 2023-12-11 NOTE — TELEPHONE ENCOUNTER
Alen Gibbs was identified as being non-adherent to his/her Lisinopril (medication name) in the last 30-90 days.  Reason(s) identified for non-adherence: other was unable to get a hold of patient  Intervention(s) offered to assist patient with adherence:  Offered mscripts - pharmacy mobile reminders to patient.    Recommendation to Provider: follow up on BP  Completed by: Tramaine Woods, Pharm D  [facility]  [phone]

## 2024-01-16 ENCOUNTER — HOSPITAL ENCOUNTER (EMERGENCY)
Facility: CLINIC | Age: 54
Discharge: LEFT BEFORE DISCHARGE | End: 2024-01-16
Admitting: PHYSICIAN ASSISTANT
Payer: COMMERCIAL

## 2024-01-16 LAB — GROUP A STREP BY PCR: NOT DETECTED

## 2024-01-16 PROCEDURE — 87651 STREP A DNA AMP PROBE: CPT | Performed by: PHYSICIAN ASSISTANT

## 2024-01-16 PROCEDURE — 99281 EMR DPT VST MAYX REQ PHY/QHP: CPT

## 2024-01-16 PROCEDURE — 999N000064 HC STATISTIC EXAM NO CHARGES: Performed by: PHYSICIAN ASSISTANT

## 2024-03-21 ENCOUNTER — OFFICE VISIT (OUTPATIENT)
Dept: FAMILY MEDICINE | Facility: CLINIC | Age: 54
End: 2024-03-21
Payer: COMMERCIAL

## 2024-03-21 VITALS
OXYGEN SATURATION: 94 % | HEART RATE: 56 BPM | WEIGHT: 260.8 LBS | SYSTOLIC BLOOD PRESSURE: 112 MMHG | HEIGHT: 72 IN | TEMPERATURE: 95.9 F | BODY MASS INDEX: 35.33 KG/M2 | DIASTOLIC BLOOD PRESSURE: 68 MMHG

## 2024-03-21 DIAGNOSIS — R53.83 FATIGUE, UNSPECIFIED TYPE: Primary | ICD-10-CM

## 2024-03-21 PROBLEM — Z20.822 COVID-19 RULED OUT: Status: RESOLVED | Noted: 2021-05-06 | Resolved: 2024-03-21

## 2024-03-21 PROBLEM — E86.0 DEHYDRATION: Status: RESOLVED | Noted: 2019-07-03 | Resolved: 2024-03-21

## 2024-03-21 LAB
ALBUMIN SERPL BCG-MCNC: 4.2 G/DL (ref 3.5–5.2)
ALP SERPL-CCNC: 71 U/L (ref 40–150)
ALT SERPL W P-5'-P-CCNC: 21 U/L (ref 0–70)
ANION GAP SERPL CALCULATED.3IONS-SCNC: 13 MMOL/L (ref 7–15)
AST SERPL W P-5'-P-CCNC: 15 U/L (ref 0–45)
BILIRUB SERPL-MCNC: 0.5 MG/DL
BUN SERPL-MCNC: 17.9 MG/DL (ref 6–20)
CALCIUM SERPL-MCNC: 9.4 MG/DL (ref 8.6–10)
CHLORIDE SERPL-SCNC: 107 MMOL/L (ref 98–107)
CREAT SERPL-MCNC: 1.17 MG/DL (ref 0.67–1.17)
DEPRECATED HCO3 PLAS-SCNC: 22 MMOL/L (ref 22–29)
EGFRCR SERPLBLD CKD-EPI 2021: 74 ML/MIN/1.73M2
ERYTHROCYTE [DISTWIDTH] IN BLOOD BY AUTOMATED COUNT: 13.1 % (ref 10–15)
GLUCOSE SERPL-MCNC: 94 MG/DL (ref 70–99)
HCT VFR BLD AUTO: 47.7 % (ref 40–53)
HGB BLD-MCNC: 15.4 G/DL (ref 13.3–17.7)
MCH RBC QN AUTO: 29.4 PG (ref 26.5–33)
MCHC RBC AUTO-ENTMCNC: 32.3 G/DL (ref 31.5–36.5)
MCV RBC AUTO: 91 FL (ref 78–100)
PLATELET # BLD AUTO: 180 10E3/UL (ref 150–450)
POTASSIUM SERPL-SCNC: 4.2 MMOL/L (ref 3.4–5.3)
PROT SERPL-MCNC: 6.8 G/DL (ref 6.4–8.3)
RBC # BLD AUTO: 5.24 10E6/UL (ref 4.4–5.9)
SODIUM SERPL-SCNC: 142 MMOL/L (ref 135–145)
TSH SERPL DL<=0.005 MIU/L-ACNC: 1.59 UIU/ML (ref 0.3–4.2)
WBC # BLD AUTO: 6.6 10E3/UL (ref 4–11)

## 2024-03-21 PROCEDURE — 99213 OFFICE O/P EST LOW 20 MIN: CPT | Performed by: NURSE PRACTITIONER

## 2024-03-21 PROCEDURE — 84443 ASSAY THYROID STIM HORMONE: CPT | Performed by: NURSE PRACTITIONER

## 2024-03-21 PROCEDURE — 85027 COMPLETE CBC AUTOMATED: CPT | Performed by: NURSE PRACTITIONER

## 2024-03-21 PROCEDURE — 36415 COLL VENOUS BLD VENIPUNCTURE: CPT | Performed by: NURSE PRACTITIONER

## 2024-03-21 PROCEDURE — 80053 COMPREHEN METABOLIC PANEL: CPT | Performed by: NURSE PRACTITIONER

## 2024-03-21 PROCEDURE — 84403 ASSAY OF TOTAL TESTOSTERONE: CPT | Performed by: NURSE PRACTITIONER

## 2024-03-21 ASSESSMENT — PAIN SCALES - GENERAL: PAINLEVEL: NO PAIN (0)

## 2024-03-21 NOTE — PROGRESS NOTES
Assessment & Plan     Fatigue, unspecified type  Patient's fatigue symptoms are very nonspecific.  He has a lot going on in his life which may contribute.  However, I am getting orders placed for labs for testosterone, CBC, TSH and CMP just to make sure that everything looks okay and that there is not something underlying causing symptoms.  Patient will continue with counseling and I have advised to follow-up if he feels that medication is needed to help him with his mood.  I will notify him of labs when they are back.  - Testosterone, total; Future  - CBC with platelets; Future  - TSH with free T4 reflex; Future  - Comprehensive metabolic panel (BMP + Alb, Alk Phos, ALT, AST, Total. Bili, TP); Future  - Comprehensive metabolic panel (BMP + Alb, Alk Phos, ALT, AST, Total. Bili, TP)  - TSH with free T4 reflex  - CBC with platelets  - Testosterone, total    See Patient Instructions    Manuelito Lowry is a 53 year old, presenting for the following health issues:  Blood Draw (Wants  testosterone level checked )      3/21/2024     7:28 AM   Additional Questions   Roomed by rmb   Accompanied by self         3/21/2024     7:28 AM   Patient Reported Additional Medications   Patient reports taking the following new medications none     History of Present Illness       Reason for visit:  Check my testosterone    He eats 0-1 servings of fruits and vegetables daily.He consumes 0 sweetened beverage(s) daily.He exercises with enough effort to increase his heart rate 20 to 29 minutes per day.  He exercises with enough effort to increase his heart rate 6 days per week. He is missing 1 dose(s) of medications per week.    Patient presents today with some fatigue and is concerned about his testosterone levels.  He is undergoing a divorce from his wife which has been hard for him as well.  He states that he is seeing a counselor at his [facility] through work.  Currently he does not feel that his depression plays a role in this and  "declines medication at this time.  He is overall a healthy 53-year-old with normal blood pressure on his lisinopril.  He does have gout and takes the allopurinol for prevention.  He denies any other symptoms besides the fatigue and not having a lot of energy.      Review of Systems  CONSTITUTIONAL: NEGATIVE for fever, chills, change in weight  RESP: NEGATIVE for significant cough or SOB  CV: NEGATIVE for chest pain, palpitations or peripheral edema  PSYCHIATRIC: POSITIVE for going through divorce currently  ROS otherwise negative      Objective    /68   Pulse 56   Temp (!) 95.9  F (35.5  C) (Tympanic)   Ht 1.838 m (6' 0.36\")   Wt 118.3 kg (260 lb 12.8 oz)   SpO2 94%   BMI 35.02 kg/m    Body mass index is 35.02 kg/m .  Physical Exam   GENERAL: alert and no distress  RESP: lungs clear to auscultation - no rales, rhonchi or wheezes  CV: regular rate and rhythm, normal S1 S2, no S3 or S4, no murmur, click or rub, no peripheral edema  SKIN: no suspicious lesions or rashes; does not appear pale  PSYCH: mentation appears flat today    Signed Electronically by: Selena Grove NP    "

## 2024-03-24 LAB — TESTOST SERPL-MCNC: 314 NG/DL (ref 240–950)

## 2024-08-24 ENCOUNTER — HEALTH MAINTENANCE LETTER (OUTPATIENT)
Age: 54
End: 2024-08-24

## 2024-10-03 DIAGNOSIS — I10 ESSENTIAL HYPERTENSION WITH GOAL BLOOD PRESSURE LESS THAN 130/80: ICD-10-CM

## 2024-10-03 RX ORDER — LISINOPRIL 10 MG/1
10 TABLET ORAL DAILY
Qty: 90 TABLET | Refills: 3 | Status: SHIPPED | OUTPATIENT
Start: 2024-10-03

## 2024-11-26 ENCOUNTER — OFFICE VISIT (OUTPATIENT)
Dept: FAMILY MEDICINE | Facility: CLINIC | Age: 54
End: 2024-11-26
Payer: COMMERCIAL

## 2024-11-26 ENCOUNTER — ANCILLARY PROCEDURE (OUTPATIENT)
Dept: GENERAL RADIOLOGY | Facility: CLINIC | Age: 54
End: 2024-11-26
Attending: FAMILY MEDICINE
Payer: COMMERCIAL

## 2024-11-26 VITALS
WEIGHT: 263 LBS | BODY MASS INDEX: 36.82 KG/M2 | SYSTOLIC BLOOD PRESSURE: 108 MMHG | HEART RATE: 75 BPM | RESPIRATION RATE: 16 BRPM | OXYGEN SATURATION: 97 % | TEMPERATURE: 96.7 F | HEIGHT: 71 IN | DIASTOLIC BLOOD PRESSURE: 78 MMHG

## 2024-11-26 DIAGNOSIS — Z12.5 SCREENING FOR PROSTATE CANCER: ICD-10-CM

## 2024-11-26 DIAGNOSIS — M25.562 LEFT KNEE PAIN, UNSPECIFIED CHRONICITY: ICD-10-CM

## 2024-11-26 DIAGNOSIS — M17.12 PRIMARY OSTEOARTHRITIS OF LEFT KNEE: ICD-10-CM

## 2024-11-26 DIAGNOSIS — Z11.59 NEED FOR HEPATITIS C SCREENING TEST: ICD-10-CM

## 2024-11-26 DIAGNOSIS — Z11.4 SCREENING FOR HIV (HUMAN IMMUNODEFICIENCY VIRUS): ICD-10-CM

## 2024-11-26 DIAGNOSIS — E78.2 MIXED HYPERLIPIDEMIA: ICD-10-CM

## 2024-11-26 DIAGNOSIS — M25.462 KNEE EFFUSION, LEFT: ICD-10-CM

## 2024-11-26 DIAGNOSIS — R39.15 URINARY URGENCY: Primary | ICD-10-CM

## 2024-11-26 LAB
ALBUMIN UR-MCNC: NEGATIVE MG/DL
APPEARANCE UR: CLEAR
BILIRUB UR QL STRIP: NEGATIVE
CHOLEST SERPL-MCNC: 247 MG/DL
COLOR UR AUTO: YELLOW
FASTING STATUS PATIENT QL REPORTED: YES
GLUCOSE UR STRIP-MCNC: NEGATIVE MG/DL
HDLC SERPL-MCNC: 55 MG/DL
HGB UR QL STRIP: NEGATIVE
KETONES UR STRIP-MCNC: NEGATIVE MG/DL
LDLC SERPL CALC-MCNC: 162 MG/DL
LEUKOCYTE ESTERASE UR QL STRIP: NEGATIVE
NITRATE UR QL: NEGATIVE
NONHDLC SERPL-MCNC: 192 MG/DL
PH UR STRIP: 6 [PH] (ref 5–7)
PSA SERPL DL<=0.01 NG/ML-MCNC: 0.86 NG/ML (ref 0–3.5)
SP GR UR STRIP: 1.02 (ref 1–1.03)
TRIGL SERPL-MCNC: 152 MG/DL
UROBILINOGEN UR STRIP-ACNC: 0.2 E.U./DL

## 2024-11-26 PROCEDURE — 90750 HZV VACC RECOMBINANT IM: CPT | Performed by: FAMILY MEDICINE

## 2024-11-26 PROCEDURE — 80061 LIPID PANEL: CPT | Performed by: FAMILY MEDICINE

## 2024-11-26 PROCEDURE — 91320 SARSCV2 VAC 30MCG TRS-SUC IM: CPT | Performed by: FAMILY MEDICINE

## 2024-11-26 PROCEDURE — 36415 COLL VENOUS BLD VENIPUNCTURE: CPT | Performed by: FAMILY MEDICINE

## 2024-11-26 PROCEDURE — 90471 IMMUNIZATION ADMIN: CPT | Performed by: FAMILY MEDICINE

## 2024-11-26 PROCEDURE — 81003 URINALYSIS AUTO W/O SCOPE: CPT | Performed by: FAMILY MEDICINE

## 2024-11-26 PROCEDURE — 99214 OFFICE O/P EST MOD 30 MIN: CPT | Mod: 25 | Performed by: FAMILY MEDICINE

## 2024-11-26 PROCEDURE — 73560 X-RAY EXAM OF KNEE 1 OR 2: CPT | Mod: TC | Performed by: STUDENT IN AN ORGANIZED HEALTH CARE EDUCATION/TRAINING PROGRAM

## 2024-11-26 PROCEDURE — 90480 ADMN SARSCOV2 VAC 1/ONLY CMP: CPT | Performed by: FAMILY MEDICINE

## 2024-11-26 PROCEDURE — G0103 PSA SCREENING: HCPCS | Performed by: FAMILY MEDICINE

## 2024-11-26 RX ORDER — TAMSULOSIN HYDROCHLORIDE 0.4 MG/1
0.4 CAPSULE ORAL DAILY
Qty: 30 CAPSULE | Refills: 0 | Status: SHIPPED | OUTPATIENT
Start: 2024-11-26

## 2024-11-26 ASSESSMENT — PAIN SCALES - GENERAL: PAINLEVEL_OUTOF10: MILD PAIN (3)

## 2024-11-26 NOTE — PROGRESS NOTES
Assessment & Plan     Urinary urgency  Suspect at least due to BPH.   Patient was advised of findings on exam and its association with his symptoms.  Discussed possible treatments; patient concurred with the above.  Patient was advised on possible ADR to med - literature printed in AVS.  Return precautions discussed and given to patient.  Refer to urology if worsening symptoms inspite of med, and/or if PSA is abnormal.  - Urine Macroscopic with reflex to Microscopic  - tamsulosin (FLOMAX) 0.4 MG capsule  Dispense: 30 capsule; Refill: 0    Left knee pain, unspecified chronicity  No red flags.  DDx: mild strain, DJD, not highly suspect for ligament tears  Start with XR to assess joint.   Refer to ortho of with significant OA  Refer to physical therapy if stable XR.  Activity as tolerated.  Return precautions discussed and given to patient.   - XR Knee Standing Left 2 Views    Mixed hyperlipidemia  Patient is fasting today. He would like his FLP measured today.  - Lipid panel reflex to direct LDL Fasting    Screening for prostate cancer  - Prostate Specific Antigen Screen        Patient Instructions   Urinary symptoms likely due to prostate enlargement.  Trial of tamsulosin to improve bladder emptying and relieve urgent sensation to void.  Watch for headaches, dizziness, or fainting with this medication.    You will be contacted in 1-2 days for results of your lab tests and Xray.  Further recommendations thereafter.    Acetaminophen 500 mg orally 1-2 tabs every 4-6 hrs as needed for pain     Subjective   Alen is a 53 year old, presenting for the following health issues:  Urinary Problem (Urgency, no hx of UTI) and Knee Pain (X1 month, left knee pain, NKI, 3/10 average pain scale)        11/26/2024    11:23 AM   Additional Questions   Roomed by Crystal SAWYER MA   Accompanied by self         11/26/2024    11:23 AM   Patient Reported Additional Medications   Patient reports taking the following new medications xenia  daily, Vitamin D3     History of Present Illness       Reason for visit:  Urinary urgency and knee pain  Symptom onset:  3-4 weeks ago  Symptoms include:  Pain in my forward stride and urgency  Symptom intensity:  Moderate  Symptom progression:  Improving  Had these symptoms before:  Yes  Has tried/received treatment for these symptoms:  Yes  Previous treatment was successful:  Yes  Prior treatment description:  Total knee replacement of right knee  What makes it worse:  Walking  What makes it better:  Not walking He is missing 1 dose(s) of medications per week.  He is not taking prescribed medications regularly due to remembering to take.         Genitourinary - Male  Onset/Duration: 1 year  Description:   Urinary urgency - moderate intensity  Dysuria (painful urination): No}  Hematuria (blood in urine): No  Frequency: No  Waking at night to urinate: YES- 1-2 per night  Hesitancy (delay in urine): No  Retention (unable to empty): sometimes feels he has not emptied bladder completely  Decrease in urinary flow: YES- depends at time  Incontinence: No  Progression of Symptoms:  waxing and waning  Accompanying Signs & Symptoms:  Fever: No  Back/Flank pain: No  Urethral discharge: No  Testicle lumps/masses/pain: No  Nausea and/or vomiting: No  Abdominal pain: No  History:   History of frequent UTI s: No  History of kidney stones: YES- 2 months ago  History of hernias: No  Personal or Family history of Prostate problems: none known to pt  Sexually active: YES  Precipitating or alleviating factors: None  Therapies tried and outcome: none    Musculoskeletal problem/pain    Duration: 1 month  Description  Location: left knee - medial and lateral aspects  Intensity:  mild, moderate  Accompanying signs and symptoms: knee feels tight  History  Previous similar problem: no   Previous evaluation:  none  Precipitating or alleviating factors:  Trauma or overuse: no   Aggravating factors include: forward strides; after sitting for  "long periods, knee feels tight; lateral movements of the leg  Therapies tried and outcome: Ibuprofen         Review of Systems  Constitutional, HEENT, cardiovascular, pulmonary, GI, , musculoskeletal, neuro, skin, endocrine and psych systems are negative, except as otherwise noted.      Objective    /78 (BP Location: Right arm, Patient Position: Sitting, Cuff Size: Adult Large)   Pulse 75   Temp (!) 96.7  F (35.9  C) (Tympanic)   Resp 16   Ht 1.803 m (5' 11\")   Wt 119.3 kg (263 lb)   SpO2 97%   BMI 36.68 kg/m    Body mass index is 36.68 kg/m .  Physical Exam   GENERAL: healthy, alert and no distress, ambulatory w/o assist  SKIN: no suspicious lesions, no rashes  BACK: no CVA tenderness  ABD: protuberant, soft, no tenderness but patient reported feeling of mild urge to urinate on palpation of suprapubic area, no guarding, no mass palpable on exam   DIRECT RECTAL EXAM: good sphincter tone, intact vault, prostate enlarged but smooth and nontender, no mass to reach of exam finger, no blood per exam glove.     Knee Exam LEFT:  Inspection: AP/lateral alignment normal, No effusion, No quad atrophy  Tender: none  Active Range of Motion: full flexion, pain with flexion, full extension, no pain with extension, mild patellofemoral crepitus with extension  Strength: preserved strength overall  Special tests: normal Valgus stress test, normal Varus, negative posterior drawer, no apprehension with lateral stress of the patella    Also examined: hip full range of motion      No results found for any visits on 11/26/24.        Signed Electronically by: Pietro Mccormick MD    "

## 2024-11-26 NOTE — RESULT ENCOUNTER NOTE
The 10-year ASCVD risk score (John SOTO, et al., 2019) is: 5.2%    Values used to calculate the score:      Age: 53 years      Sex: Male      Is Non- : No      Diabetic: No      Tobacco smoker: No      Systolic Blood Pressure: 108 mmHg      Is BP treated: Yes      HDL Cholesterol: 55 mg/dL      Total Cholesterol: 247 mg/dL

## 2024-11-26 NOTE — PATIENT INSTRUCTIONS
Urinary symptoms likely due to prostate enlargement.  Trial of tamsulosin to improve bladder emptying and relieve urgent sensation to void.  Watch for headaches, dizziness, or fainting with this medication.    You will be contacted in 1-2 days for results of your lab tests and Xray.  Further recommendations thereafter.    Acetaminophen 500 mg orally 1-2 tabs every 4-6 hrs as needed for pain

## 2024-11-27 ENCOUNTER — PATIENT OUTREACH (OUTPATIENT)
Dept: CARE COORDINATION | Facility: CLINIC | Age: 54
End: 2024-11-27
Payer: COMMERCIAL

## 2024-12-02 ENCOUNTER — ANCILLARY PROCEDURE (OUTPATIENT)
Dept: GENERAL RADIOLOGY | Facility: CLINIC | Age: 54
End: 2024-12-02
Attending: ORTHOPAEDIC SURGERY
Payer: COMMERCIAL

## 2024-12-02 ENCOUNTER — OFFICE VISIT (OUTPATIENT)
Dept: ORTHOPEDICS | Facility: CLINIC | Age: 54
End: 2024-12-02
Attending: FAMILY MEDICINE
Payer: COMMERCIAL

## 2024-12-02 VITALS
HEIGHT: 71 IN | BODY MASS INDEX: 37.56 KG/M2 | DIASTOLIC BLOOD PRESSURE: 97 MMHG | HEART RATE: 86 BPM | SYSTOLIC BLOOD PRESSURE: 133 MMHG | WEIGHT: 268.3 LBS

## 2024-12-02 DIAGNOSIS — M25.562 LEFT KNEE PAIN, UNSPECIFIED CHRONICITY: ICD-10-CM

## 2024-12-02 DIAGNOSIS — M25.562 ACUTE PAIN OF LEFT KNEE: ICD-10-CM

## 2024-12-02 DIAGNOSIS — M17.12 PRIMARY OSTEOARTHRITIS OF LEFT KNEE: Primary | ICD-10-CM

## 2024-12-02 PROCEDURE — 73560 X-RAY EXAM OF KNEE 1 OR 2: CPT | Mod: TC | Performed by: RADIOLOGY

## 2024-12-02 PROCEDURE — 99203 OFFICE O/P NEW LOW 30 MIN: CPT | Performed by: ORTHOPAEDIC SURGERY

## 2024-12-02 ASSESSMENT — KOOS JR
HOW SEVERE IS YOUR KNEE STIFFNESS AFTER FIRST WAKING IN MORNING: MODERATE
GOING UP OR DOWN STAIRS: MILD
KOOS JR SCORING: 61.58
TWISING OR PIVOTING ON KNEE: MODERATE
STANDING UPRIGHT: MILD
BENDING TO THE FLOOR TO PICK UP OBJECT: MODERATE
STRAIGHTENING KNEE FULLY: MILD
RISING FROM SITTING: MILD

## 2024-12-02 ASSESSMENT — PAIN SCALES - GENERAL: PAINLEVEL_OUTOF10: NO PAIN (1)

## 2024-12-02 NOTE — LETTER
12/2/2024      Alen Gibbs  [address]  [facility] [place] [facility] [address]      Dear Colleague,    Thank you for referring your patient, Alen Gibbs, to the [facility]. Please see a copy of my visit note below.    CHIEF COMPLAINT:   Chief Complaint   Patient presents with     Left Knee - Pain     Onset: about a month. NKI. Pain just came on and can get worse with walking. He was seen and told he had an effusion. Pain is on medial side of knee. He has some tenderness. Pain is worse with walking, walking can cause it to flare. He has been resting, elevating, and using ibuprofen as needed. He has had a right Total knee arthroplasty. No tx. He has been doing his own physical therapy.          Alen Gibbs is seen today in the [facility] for evaluation of left knee pain at the request of Dr. Pietro Mccormick       HISTORY OF PRESENT ILLNESS    Alen Gibbs is a 53 year old male seen for evaluation of ongoing left knee pain with no known injury.   Pain has been present for 1 months. He locates pain along the inner aspect (points medially), aggravated with walking, twisting, lateral movements. Constant, nagging, aching pain. Can feel at rest, occasional at night depending on the days' activities. Some swelling. No locking, catching , giving way.    Treatment with rest, elevation, ibuprofen as needed. Doing home exercise program, no formal therapy.    History right total knee arthroplasty 5/6/2021 with Dr Juan Manuel Landin, [facility]. This has done very well.    Does have some right hip on occasional.    Left foot little toe numbness, constant, for a few years.    Present symptoms: pain medially , pain dull/achy , mild pain, mild swelling.    Pain severity: 1/10  Frequency of symptoms: frequently  Exacerbating Factors: weight bearing, twisting  Relieving Factors: rest, sitting, medications: ibuprofen.  Night Pain: Yes  Pain  while at rest: Yes   Numbness or tingling:  unrelated    Patient has tried:     NSAIDS: Yes      Acetaminophen:  occasional.     Opioids: No     Physical Therapy: No      Home Exercise Program / Resistance training: Yes      Activity modification: No      Bracing: No      Assistive device:  No     Injections: No       Ice: Yes      Heat: No      Topicals: Yes, doesn't help much.        Other PMH:  has a past medical history of HTN (hypertension), Hyperlipidemia, and Syncope and collapse.    He has no past medical history of Basal cell carcinoma, Malignant melanoma (H), or Squamous cell carcinoma.  Patient Active Problem List   Diagnosis     CARDIOVASCULAR SCREENING; LDL GOAL LESS THAN 130     Tinea versicolor     Essential hypertension with goal blood pressure less than 130/80     Chondromalacia of patella, right     Obesity (BMI 35.0-39.9) with comorbidity (H)     Tinea corporis     Mixed hyperlipidemia     Status post total knee replacement, right      Right knee DJD     Gout       Surgical Hx:  has a past surgical history that includes Bone marrow aspiration only; Arthrotomy wrist (Right, 6/10/2016); Colonoscopy (N/A, 4/16/2021); and Arthroplasty knee (Right, 5/6/2021).    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   reports that he has never smoked. He has never been exposed to tobacco smoke. He has never used smokeless tobacco. He reports current alcohol use. He reports that he does not use  "drugs.    Family Hx: family history includes Alzheimer Disease in his maternal grandmother; Blood Disease (age of onset: 4) in his brother; Breast Cancer in his mother; Cardiovascular in his father; Diabetes in his daughter; Diabetes (age of onset: 66) in his father; Respiratory in his paternal grandfather.    REVIEW OF SYSTEMS: 10 point ROS neg other than the symptoms noted above in the HPI and PMH. Notables include  CONSTITUTIONAL:NEGATIVE for fever, chills, change in weight  INTEGUMENTARY/SKIN: NEGATIVE for worrisome rashes, moles or lesions  MUSCULOSKELETAL:See HPI above  NEURO: NEGATIVE for weakness, dizziness or paresthesias    PHYSICAL EXAM:  BP (!) 133/97   Pulse 86   Ht 1.803 m (5' 11\")   Wt 121.7 kg (268 lb 4.8 oz)   BMI 37.42 kg/m     GENERAL APPEARANCE: healthy, alert, no distress  SKIN: no suspicious lesions or rashes  NEURO: Normal strength and tone, mentation intact and speech normal  PSYCH:  mentation appears normal and affect normal, not anxious  RESPIRATORY: No increased work of breathing.  HANDS: no clubbing, nail pitting  LYMPH: no palpable popliteal lymphadenopathy.    BILATERAL LOWER EXTREMITIES:  Gait: normal  Alignment: varus  No gross deformities or masses.  No Quad atrophy, strength normal.  Intact sensation deep peroneal nerve, superficial peroneal nerve, med/lat tibial nerve, sural nerve, saphenous nerve  Intact EHL, EDL, TA, FHL, GS, quadriceps hamstrings and hip flexors  Toes warm and well perfused, brisk capillary refill. Palpable 2+ dp pulses.  Bilateral calf soft and nttp or squeeze.  DTRs: achilles 2+, patella 2+.  Edema: trace  Bilateral lower extremity varicose veins.    LEFT KNEE EXAM:    Skin: intact, no ecchymosis or erythema   ROM: full extension to full flexion  Tight hamstrings on straight leg raise.  Effusion: trace  Tender: medial joint line  NTTP lat joint line, anterior or posterior knee  Posterior fullness.  McMurrays: negative    MCL: stable, and non-painful at " "both 0 and 30 degrees knee flexion  Varus stress: stable, and non-painful at both 0 and 30 degrees knee flexion  Lachmans: neg, firm endpoint  Posterior Drawer stable  Patellofemoral joint:                Apprehension: negative              Crepitations: mild   Grind: mild positive.    RIGHT KNEE EXAM:    Skin: intact, no ecchymosis or erythema ; midline surgical scar.  ROM: full extension to 120+ flexion   Effusion: trace  Tender: none    X-RAY: 2 views left knee from 11/26/2024, sunrise view 12/2/2024  were reviewed in clinic today. On my review, no obvious fractures or dislocations.  Mild -moderate medial and patellofemoral compartment degenerative change with joint space narrowing and small marginal osteophytes. Small well-corticated fragment along the superior aspect of the patella which which is may be sequelae remote injury or a fragmented osteophyte. Probable trace left knee effusion.  Consistent with Kellgren Stage 2 osteoarthritis.          ASSESSMENT/PLAN: Alen Gibbs is a 53 year old male with acute left knee pain, primary osteoarthritis .     * reviewed imaging studies with patient, showing arthritic changes, or wearing of the cartilage in the knee. This can be caused by normal \"wear and tear\" over the years or following prior injury to the knee.  Treatment typically starts nonsurgically. Surgical indication for total knee arthroplasty  when nonsurgical management is no longer effective.    Non-surgical treatment for knee arthritis includes:    * rest, sitting  * Activity modification - avoid impact activities or activities that aggravate symptoms.  * NSAIDS (non-steroidal anti-inflammatory medications; e.g. Aleve, advil, motrin, ibuprofen) - regular use for inflammation ( twice daily or three times daily), with food, as long as no contra-indications Please discuss with primary care doctor if needed  * ice, 15-20 minutes at a time several times a day or as needed.  * Strengthening of quadriceps " "muscles  * Physical Therapy for strengthening, stretching and range of motion exercises of legs  * Tylenol as needed for pain, consider Tylenol arthritis or similar  * Weight loss: Weight loss:  Body mass index is 37.42 kg/m .. weight loss benefits, not only for the current pain symptoms, but also overall health. Recommend a good diet plan that works for the patient, with the assistance of a dietician or primary care doctor as needed. Also, a good, low-impact exercise program for at least 20 minutes per day, 3 times per week, such as exercise bike, elliptical , or pool.  * Exercise: low impact such as stationary bike, elliptical, pool.  * Injections: cortisone versus viscosupplementation (hyaluronic acid, \"rooster comb\", \"gel shots\"); risks and perceived benefits discussed today. Patient elects NOT to proceed.  * Bracing: bracing the knee may offer some relief of symptoms when worn and provide some stability.  * over the counter supplements such as glucosamine and chondroitin sulfate may help with joint pain.  * topical ointments may help as well    * return to clinic as needed.         Frederick Hwang M.D., M.S.  Dept. of Orthopaedic Surgery  [facility]           Again, thank you for allowing me to participate in the care of your patient.        Sincerely,        Frederick Hwang MD  "

## 2024-12-02 NOTE — PROGRESS NOTES
CHIEF COMPLAINT:   Chief Complaint   Patient presents with    Left Knee - Pain     Onset: about a month. NKI. Pain just came on and can get worse with walking. He was seen and told he had an effusion. Pain is on medial side of knee. He has some tenderness. Pain is worse with walking, walking can cause it to flare. He has been resting, elevating, and using ibuprofen as needed. He has had a right Total knee arthroplasty. No tx. He has been doing his own physical therapy.          Alen Gibbs is seen today in the [facility] for evaluation of left knee pain at the request of Dr. Pietro Mccormick       HISTORY OF PRESENT ILLNESS    Alen Gibbs is a 53 year old male seen for evaluation of ongoing left knee pain with no known injury.   Pain has been present for 1 months. He locates pain along the inner aspect (points medially), aggravated with walking, twisting, lateral movements. Constant, nagging, aching pain. Can feel at rest, occasional at night depending on the days' activities. Some swelling. No locking, catching , giving way.    Treatment with rest, elevation, ibuprofen as needed. Doing home exercise program, no formal therapy.    History right total knee arthroplasty 5/6/2021 with Dr Juan Manuel Landin, [facility]. This has done very well.    Does have some right hip on occasional.    Left foot little toe numbness, constant, for a few years.    Present symptoms: pain medially , pain dull/achy , mild pain, mild swelling.    Pain severity: 1/10  Frequency of symptoms: frequently  Exacerbating Factors: weight bearing, twisting  Relieving Factors: rest, sitting, medications: ibuprofen.  Night Pain: Yes  Pain while at rest: Yes   Numbness or tingling:  unrelated    Patient has tried:     NSAIDS: Yes      Acetaminophen:  occasional.     Opioids: No     Physical Therapy: No      Home Exercise Program / Resistance training: Yes      Activity modification: No       Bracing: No      Assistive device:  No     Injections: No       Ice: Yes      Heat: No      Topicals: Yes, doesn't help much.        Other PMH:  has a past medical history of HTN (hypertension), Hyperlipidemia, and Syncope and collapse.    He has no past medical history of Basal cell carcinoma, Malignant melanoma (H), or Squamous cell carcinoma.  Patient Active Problem List   Diagnosis    CARDIOVASCULAR SCREENING; LDL GOAL LESS THAN 130    Tinea versicolor    Essential hypertension with goal blood pressure less than 130/80    Chondromalacia of patella, right    Obesity (BMI 35.0-39.9) with comorbidity (H)    Tinea corporis    Mixed hyperlipidemia    Status post total knee replacement, right     Right knee DJD    Gout       Surgical Hx:  has a past surgical history that includes Bone marrow aspiration only; Arthrotomy wrist (Right, 6/10/2016); Colonoscopy (N/A, 4/16/2021); and Arthroplasty knee (Right, 5/6/2021).    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   reports that he has never smoked. He has never been exposed to tobacco smoke. He has never used smokeless tobacco. He reports current alcohol use. He reports that he does not use drugs.    Family Hx: family history includes Alzheimer Disease in his maternal grandmother; Blood Disease (age of onset: 4) in his brother; Breast Cancer in his mother; Cardiovascular in his father; Diabetes in his daughter; Diabetes (age of onset: 66) in his father; Respiratory in  "his paternal grandfather.    REVIEW OF SYSTEMS: 10 point ROS neg other than the symptoms noted above in the HPI and PMH. Notables include  CONSTITUTIONAL:NEGATIVE for fever, chills, change in weight  INTEGUMENTARY/SKIN: NEGATIVE for worrisome rashes, moles or lesions  MUSCULOSKELETAL:See HPI above  NEURO: NEGATIVE for weakness, dizziness or paresthesias    PHYSICAL EXAM:  BP (!) 133/97   Pulse 86   Ht 1.803 m (5' 11\")   Wt 121.7 kg (268 lb 4.8 oz)   BMI 37.42 kg/m     GENERAL APPEARANCE: healthy, alert, no distress  SKIN: no suspicious lesions or rashes  NEURO: Normal strength and tone, mentation intact and speech normal  PSYCH:  mentation appears normal and affect normal, not anxious  RESPIRATORY: No increased work of breathing.  HANDS: no clubbing, nail pitting  LYMPH: no palpable popliteal lymphadenopathy.    BILATERAL LOWER EXTREMITIES:  Gait: normal  Alignment: varus  No gross deformities or masses.  No Quad atrophy, strength normal.  Intact sensation deep peroneal nerve, superficial peroneal nerve, med/lat tibial nerve, sural nerve, saphenous nerve  Intact EHL, EDL, TA, FHL, GS, quadriceps hamstrings and hip flexors  Toes warm and well perfused, brisk capillary refill. Palpable 2+ dp pulses.  Bilateral calf soft and nttp or squeeze.  DTRs: achilles 2+, patella 2+.  Edema: trace  Bilateral lower extremity varicose veins.    LEFT KNEE EXAM:    Skin: intact, no ecchymosis or erythema   ROM: full extension to full flexion  Tight hamstrings on straight leg raise.  Effusion: trace  Tender: medial joint line  NTTP lat joint line, anterior or posterior knee  Posterior fullness.  McMurrays: negative    MCL: stable, and non-painful at both 0 and 30 degrees knee flexion  Varus stress: stable, and non-painful at both 0 and 30 degrees knee flexion  Lachmans: neg, firm endpoint  Posterior Drawer stable  Patellofemoral joint:                Apprehension: negative              Crepitations: mild   Grind: mild " "positive.    RIGHT KNEE EXAM:    Skin: intact, no ecchymosis or erythema ; midline surgical scar.  ROM: full extension to 120+ flexion   Effusion: trace  Tender: none    X-RAY: 2 views left knee from 11/26/2024, sunrise view 12/2/2024  were reviewed in clinic today. On my review, no obvious fractures or dislocations.  Mild -moderate medial and patellofemoral compartment degenerative change with joint space narrowing and small marginal osteophytes. Small well-corticated fragment along the superior aspect of the patella which which is may be sequelae remote injury or a fragmented osteophyte. Probable trace left knee effusion.  Consistent with Kellgren Stage 2 osteoarthritis.          ASSESSMENT/PLAN: Alen Gibbs is a 53 year old male with acute left knee pain, primary osteoarthritis .     * reviewed imaging studies with patient, showing arthritic changes, or wearing of the cartilage in the knee. This can be caused by normal \"wear and tear\" over the years or following prior injury to the knee.  Treatment typically starts nonsurgically. Surgical indication for total knee arthroplasty  when nonsurgical management is no longer effective.    Non-surgical treatment for knee arthritis includes:    * rest, sitting  * Activity modification - avoid impact activities or activities that aggravate symptoms.  * NSAIDS (non-steroidal anti-inflammatory medications; e.g. Aleve, advil, motrin, ibuprofen) - regular use for inflammation ( twice daily or three times daily), with food, as long as no contra-indications Please discuss with primary care doctor if needed  * ice, 15-20 minutes at a time several times a day or as needed.  * Strengthening of quadriceps muscles  * Physical Therapy for strengthening, stretching and range of motion exercises of legs  * Tylenol as needed for pain, consider Tylenol arthritis or similar  * Weight loss: Weight loss:  Body mass index is 37.42 kg/m .. weight loss benefits, not only for the current pain " "symptoms, but also overall health. Recommend a good diet plan that works for the patient, with the assistance of a dietician or primary care doctor as needed. Also, a good, low-impact exercise program for at least 20 minutes per day, 3 times per week, such as exercise bike, elliptical , or pool.  * Exercise: low impact such as stationary bike, elliptical, pool.  * Injections: cortisone versus viscosupplementation (hyaluronic acid, \"rooster comb\", \"gel shots\"); risks and perceived benefits discussed today. Patient elects NOT to proceed.  * Bracing: bracing the knee may offer some relief of symptoms when worn and provide some stability.  * over the counter supplements such as glucosamine and chondroitin sulfate may help with joint pain.  * topical ointments may help as well    * return to clinic as needed.         Frederick Hwang M.D., M.S.  Dept. of Orthopaedic Surgery  [facility]       "

## 2025-01-11 DIAGNOSIS — R39.15 URINARY URGENCY: ICD-10-CM

## 2025-01-11 DIAGNOSIS — M10.9 ACUTE GOUTY ARTHRITIS: ICD-10-CM

## 2025-01-13 RX ORDER — TAMSULOSIN HYDROCHLORIDE 0.4 MG/1
0.4 CAPSULE ORAL DAILY
Qty: 30 CAPSULE | Refills: 0 | Status: SHIPPED | OUTPATIENT
Start: 2025-01-13

## 2025-01-13 NOTE — TELEPHONE ENCOUNTER
Break in medication. Rebecca Weathers RN on 1/13/2025 at 12:16 PM  Last office visit: 11/26/2024 ; last virtual visit: 5/1/2023   Future Office Visit:

## 2025-01-14 RX ORDER — ALLOPURINOL 300 MG/1
1 TABLET ORAL DAILY
Qty: 90 TABLET | Refills: 3 | Status: SHIPPED | OUTPATIENT
Start: 2025-01-14

## 2025-02-18 ENCOUNTER — HOSPITAL ENCOUNTER (EMERGENCY)
Facility: CLINIC | Age: 55
Discharge: HOME | End: 2025-02-18
Attending: NURSE PRACTITIONER | Admitting: NURSE PRACTITIONER
Payer: COMMERCIAL

## 2025-02-18 VITALS
TEMPERATURE: 98.1 F | HEART RATE: 99 BPM | RESPIRATION RATE: 15 BRPM | SYSTOLIC BLOOD PRESSURE: 117 MMHG | DIASTOLIC BLOOD PRESSURE: 79 MMHG | OXYGEN SATURATION: 100 %

## 2025-02-18 DIAGNOSIS — J02.9 VIRAL PHARYNGITIS: ICD-10-CM

## 2025-02-18 LAB
FLUAV RNA SPEC QL NAA+PROBE: NEGATIVE
FLUBV RNA RESP QL NAA+PROBE: NEGATIVE
RSV RNA SPEC NAA+PROBE: NEGATIVE
S PYO DNA THROAT QL NAA+PROBE: NOT DETECTED
SARS-COV-2 RNA RESP QL NAA+PROBE: NEGATIVE

## 2025-02-18 PROCEDURE — 87637 SARSCOV2&INF A&B&RSV AMP PRB: CPT | Performed by: NURSE PRACTITIONER

## 2025-02-18 PROCEDURE — 99213 OFFICE O/P EST LOW 20 MIN: CPT | Performed by: NURSE PRACTITIONER

## 2025-02-18 PROCEDURE — 87651 STREP A DNA AMP PROBE: CPT | Performed by: NURSE PRACTITIONER

## 2025-02-18 PROCEDURE — G0463 HOSPITAL OUTPT CLINIC VISIT: HCPCS | Performed by: NURSE PRACTITIONER

## 2025-02-19 NOTE — ED PROVIDER NOTES
Chief Complaint:   No chief complaint on file.        HPI:     Alen Gibbs is a 54 year old male who presents to the  with a 2 day history of sore throat.  He has also had moderate amount of clear postnasal drainage that began 2 days ago.  He has not had Rhinorrhea, Hoarseness, Nausea, Vomitting, Diarrhea.  He has not tried over-the-counter medications.  He has not had a measured temperature however has reported feeling mildly feverish with chills.  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  Patient is immunized for influenza and COVID.    Medications:   Current Outpatient Medications   Medication Sig Dispense Refill    allopurinol (ZYLOPRIM) 300 MG tablet TAKE 1 TABLET (300 MG) BY MOUTH DAILY 90 tablet 3    lisinopril (ZESTRIL) 10 MG tablet TAKE 1 TABLET (10 MG) BY MOUTH DAILY 90 tablet 3    tamsulosin (FLOMAX) 0.4 MG capsule TAKE 1 CAPSULE (0.4 MG) BY MOUTH DAILY. 30 capsule 0       Allergies:   Allergies   Allergen Reactions    Norco [Hydrocodone-Acetaminophen] Nausea    Simvastatin Other (See Comments)     fasciatus       Medications updated and reviewed.  Past, family and surgical history is updated and reviewed in the record.  Patient Active Problem List    Diagnosis Date Noted    Status post total knee replacement, right  05/06/2021     Priority: Medium    Right knee DJD 05/06/2021     Priority: Medium    Gout 05/06/2021     Priority: Medium    Tinea corporis 06/07/2019     Priority: Medium    Obesity (BMI 35.0-39.9) with comorbidity (H) 03/01/2019     Priority: Medium    Chondromalacia of patella, right 01/08/2018     Priority: Medium    Essential hypertension with goal blood pressure less than 130/80 05/20/2016     Priority: Medium    Tinea versicolor 01/15/2016     Priority: Medium    CARDIOVASCULAR SCREENING; LDL GOAL LESS THAN 130 07/21/2015     Priority: Medium    Mixed hyperlipidemia 01/25/2010      Priority: Medium     Past Medical History:   Diagnosis Date    HTN (hypertension)     Hyperlipidemia     Syncope and collapse          Review of Systems:  General: see HPI  HENT: see HPI  Skin: see HPI    Physical Exam:   There were no vitals taken for this visit.   General: well groomed and in no acute distress, appearing stated age  Ears: negative  Nose: no drainage. and turbinates normal size.  Mouth/Throat: erythematous.  Trismus is not present. Muffled voice is not present. Uvular shift is not present.   Neck: Neck supple. No adenopathy. Thyroid symmetric, normal size,  Chest/Pulmonary: clear to auscultation  Cardiac: S1S2, RRR, No murmur      Medical Decision Making:  Sore throat with no exam findings to suggest peritonsillar abscess.  Strep testing by PCR is negative and RSV, COVID, influenza testing is negative.    Assessment:  Viral pharyngitis    Plan:   We will treat symptoms of pharyngitis and antibiotics are not indicated.    He was advised to gargle with warm salt water 4 times a day and try to drink as much fluid as possible. Use acetaminophen, ibuprofen for discomfort. Return to the ER with increased pain, inability to swallow fluids, fever, rash or any concerns.     Condition on disposition: Stable    Disclaimer: This note consists of symbols derived from keyboarding, dictation, and/or voice recognition software. As a result, there may be errors in the script that have gone undetected.  Please consider this when interpreting information found in the chart.       Erum Paniagua APRN CNP  02/18/25 1912       Erum Paniagua APRN CNP  02/19/25 1151

## 2025-02-19 NOTE — DISCHARGE INSTRUCTIONS
Negative strep and RSV, COVID and Influenza testing today.  Increase oral fluid intake manage fevers or pain with Tylenol or ibuprofen.

## 2025-04-13 DIAGNOSIS — R39.15 URINARY URGENCY: ICD-10-CM

## 2025-04-15 RX ORDER — TAMSULOSIN HYDROCHLORIDE 0.4 MG/1
0.4 CAPSULE ORAL DAILY
Qty: 30 CAPSULE | Refills: 0 | Status: SHIPPED | OUTPATIENT
Start: 2025-04-15

## 2025-06-04 ENCOUNTER — OFFICE VISIT (OUTPATIENT)
Dept: FAMILY MEDICINE | Facility: CLINIC | Age: 55
End: 2025-06-04
Payer: COMMERCIAL

## 2025-06-04 VITALS
HEART RATE: 95 BPM | SYSTOLIC BLOOD PRESSURE: 126 MMHG | WEIGHT: 274.4 LBS | BODY MASS INDEX: 37.17 KG/M2 | DIASTOLIC BLOOD PRESSURE: 82 MMHG | RESPIRATION RATE: 20 BRPM | OXYGEN SATURATION: 95 % | HEIGHT: 72 IN | TEMPERATURE: 98.9 F

## 2025-06-04 DIAGNOSIS — K52.9 ACUTE GASTROENTERITIS: Primary | ICD-10-CM

## 2025-06-04 PROCEDURE — 1125F AMNT PAIN NOTED PAIN PRSNT: CPT | Performed by: FAMILY MEDICINE

## 2025-06-04 PROCEDURE — 3074F SYST BP LT 130 MM HG: CPT | Performed by: FAMILY MEDICINE

## 2025-06-04 PROCEDURE — 3079F DIAST BP 80-89 MM HG: CPT | Performed by: FAMILY MEDICINE

## 2025-06-04 PROCEDURE — 99213 OFFICE O/P EST LOW 20 MIN: CPT | Performed by: FAMILY MEDICINE

## 2025-06-04 ASSESSMENT — PAIN SCALES - GENERAL: PAINLEVEL_OUTOF10: MILD PAIN (1)

## 2025-06-04 ASSESSMENT — ENCOUNTER SYMPTOMS: DIARRHEA: 1

## 2025-06-04 NOTE — PROGRESS NOTES
Assessment & Plan     Acute gastroenteritis  No sign of dehydration or acute abdomen.  Advised most consistent with post-AGE IBS or symptoms, expected to continue to improve and resolve in another 1-2 weeks.  Advised oral hydration and modified diet.  Reassess if still with diarrhea after 2 weeks.  Return precautions discussed and given to patient.     Patient asked about use of supplements. Advised in general, dietary supplements do not have clear evidence of benefit of advantage in daily/routine use.  Advised no need to take any supplements at this time. Advised probiotics pills benefits, if any, are short lived and are only while one continuously takes it.  Advised ways to support gut health.    Follow-up   Return in about 2 weeks (around 6/18/2025) for In-clinic visit for if with persistent diarrhea.        Manuelito Lowry is a 54 year old, presenting for the following health issues:  Diarrhea        6/4/2025    10:36 AM   Additional Questions   Roomed by Lisseth WINTER   Accompanied by self         6/4/2025    10:36 AM   Patient Reported Additional Medications   Patient reports taking the following new medications omeprazole     Diarrhea    History of Present Illness       Reason for visit:  Abdominal pain, diarrhea  Symptom onset:  1-2 weeks ago  Symptoms include:  Diarrhea and abdominal pain  Symptom intensity:  Moderate  Symptom progression:  Improving  Had these symptoms before:  No He is missing 1 dose(s) of medications per week.  He is not taking prescribed medications regularly due to remembering to take.        Diarrhea  Onset/Duration: 1-2 weeks  Description:       Consistency of stool: loose , not watery       Blood in stool: not in the stool, on toilet paper       Number of loose stools past 24 hours: 2  Progression of Symptoms: improving  Accompanying signs and symptoms:       Fever: initial but not currently       Nausea/Vomiting: initial but not currently       Abdominal pain: YES - just  "discomfort       Weight loss: No       Episodes of constipation: YES  History   Ill contacts: works in the ED  Recent use of antibiotics: No  Recent travels: YES- [place] - just visiting with friends; did not karlo in water  Recent medication-new or changes(Rx or OTC): YES- added omeprazole, recently stopped turmeric supplement  Precipitating or alleviating factors: ate at [employer], symptoms started after that; patient denies eating undercooked or raw food otherwise.  Therapies tried and outcome: PeptoBismol, ibuprofen, tylenol - feels the peptobismol has helped reduce diarrhea    Patient denies other household members with diarrhea.  Patient was the only one who had the food from [employer] at the time.  No recent use of abx.  Has not had recent hospitalization.    Review of Systems  Constitutional, HEENT, cardiovascular, pulmonary, GI, , musculoskeletal, neuro, skin, endocrine and psych systems are negative, except as otherwise noted.      Objective    /82 (BP Location: Right arm, Patient Position: Chair, Cuff Size: Adult Large)   Pulse 95   Temp 98.9  F (37.2  C) (Tympanic)   Resp 20   Ht 1.835 m (6' 0.25\")   Wt 124.5 kg (274 lb 6.4 oz)   SpO2 95%   BMI 36.96 kg/m    Body mass index is 36.96 kg/m .  Physical Exam   GENERAL: obese , alert and no distress  EYES: no icterus  RESP: lungs clear to auscultation - no rales, no rhonchi, no wheezes  CV: regular rates and rhythm, normal S1 S2, no S3 or S4 and no murmur, no click or rub  ABD: protuberant abdomen, no skin changes, mild hypogastric TTP, no palpable mass, no guarding, no Jeffrey's sign, no palpable organomegaly  MS: extremities- no gross deformities noted, no edema  SKIN: good turgor, no jaundice or rash     No results found for any visits on 06/04/25.        Signed Electronically by: Pietro Mccormick MD    "

## 2025-06-04 NOTE — PATIENT INSTRUCTIONS
Likely residual symptoms from recent gastroenteritis.    May take antidiarrheal medication only if needing to go out and not have easy access to restrooms.    Drink plenty of water/fhuids a day.    Eat balanced, simpler/clean diet everyday to sustain good gut bacteria.  May eat yogurt a day as well.

## 2025-07-10 ENCOUNTER — OFFICE VISIT (OUTPATIENT)
Dept: FAMILY MEDICINE | Facility: CLINIC | Age: 55
End: 2025-07-10
Payer: COMMERCIAL

## 2025-07-10 VITALS
SYSTOLIC BLOOD PRESSURE: 102 MMHG | HEIGHT: 71 IN | BODY MASS INDEX: 38.84 KG/M2 | HEART RATE: 111 BPM | WEIGHT: 277.4 LBS | RESPIRATION RATE: 20 BRPM | TEMPERATURE: 99.3 F | DIASTOLIC BLOOD PRESSURE: 74 MMHG | OXYGEN SATURATION: 95 %

## 2025-07-10 DIAGNOSIS — R07.0 THROAT PAIN: ICD-10-CM

## 2025-07-10 DIAGNOSIS — R05.1 ACUTE COUGH: Primary | ICD-10-CM

## 2025-07-10 LAB
DEPRECATED S PYO AG THROAT QL EIA: NEGATIVE
S PYO DNA THROAT QL NAA+PROBE: NOT DETECTED
SARS-COV-2 RNA RESP QL NAA+PROBE: NEGATIVE

## 2025-07-10 ASSESSMENT — PAIN SCALES - GENERAL: PAINLEVEL_OUTOF10: MODERATE PAIN (5)

## 2025-07-10 NOTE — PROGRESS NOTES
"  Assessment & Plan     Cough  Mild. Lungs clear. Not hypoxic. Suspect viral etiology, COVID pending. Does not want antivirals if positive. Symptomatic care advised.  - COVID-19 Virus (Coronavirus) by PCR Nose    Throat pain  Negative strep, PCR pending. Suspect viral etiology.  - Streptococcus A Rapid Screen w/Reflex to PCR  - Group A Streptococcus PCR Throat Swab    BMI  Estimated body mass index is 38.42 kg/m  as calculated from the following:    Height as of this encounter: 1.81 m (5' 11.25\").    Weight as of this encounter: 125.8 kg (277 lb 6.4 oz).       Follow-up  Return in about 1 week (around 7/17/2025) for worsening or continued symptoms.    Manuelito Lwory is a 54 year old, presenting for the following health issues:  Cough        7/10/2025    12:31 PM   Additional Questions   Roomed by Kalli WINTER   Accompanied by self         7/10/2025    12:31 PM   Patient Reported Additional Medications   Patient reports taking the following new medications none     History of Present Illness       Reason for visit:  Sore throat and cough  Symptom onset:  1-3 days ago  Symptom intensity:  Moderate  Symptom progression:  Staying the same  Had these symptoms before:  Yes  Has tried/received treatment for these symptoms:  Yes  Previous treatment was successful:  Yes  Prior treatment description:  Antibiotics for strep  What makes it worse:  Physical activity  What makes it better:  Resting He is missing 1 dose(s) of medications per week.          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      Objective    /74   Pulse 111   Temp 99.3  F (37.4  C) (Tympanic)   Resp 20   Ht 1.81 m (5' 11.25\")   Wt 125.8 kg (277 lb 6.4 oz)   SpO2 95%   BMI 38.42 kg/m    Body mass index is 38.42 kg/m .  Physical Exam  Vitals and nursing note reviewed.   Constitutional:       Appearance: Normal appearance.   HENT:      Head: Normocephalic and atraumatic.      Right Ear: Tympanic membrane and ear canal normal.      Left Ear: Tympanic membrane and ear canal normal.      Nose: Nose normal. No rhinorrhea.      Right Sinus: No maxillary sinus tenderness or frontal sinus tenderness.      Left Sinus: No maxillary sinus tenderness or frontal sinus tenderness.      Mouth/Throat:      Lips: Pink.      Mouth: Mucous membranes are moist.      Pharynx: Oropharynx is clear. Posterior oropharyngeal erythema present. No oropharyngeal exudate.   Eyes:      General: Lids are normal.      Conjunctiva/sclera: Conjunctivae normal.      Comments: Non icteric   Cardiovascular:      Rate and Rhythm: Normal rate and regular rhythm.      Pulses: Normal pulses.      Heart sounds: Normal heart sounds, S1 normal and S2 normal.   Pulmonary:      Effort: Pulmonary effort is normal.      Breath sounds: Normal breath sounds and air entry.   Musculoskeletal:      Cervical back: Neck supple.   Lymphadenopathy:      Cervical: No cervical adenopathy.   Skin:     General: Skin is warm and dry.   Neurological:      General: No focal deficit present.      Mental Status: He is alert and oriented to person, place, and time.   Psychiatric:         Mood and Affect: Mood normal.         Behavior: Behavior normal.         Thought Content: Thought content normal.         Judgment: Judgment normal. "            Recent Results (from the past 24 hours)   Streptococcus A Rapid Screen w/Reflex to PCR    Specimen: Throat; Swab   Result Value Ref Range    Group A Strep antigen Negative Negative           Signed Electronically by: MACY Huang CNP

## 2025-07-28 DIAGNOSIS — R39.15 URINARY URGENCY: ICD-10-CM

## 2025-07-29 RX ORDER — TAMSULOSIN HYDROCHLORIDE 0.4 MG/1
0.4 CAPSULE ORAL DAILY
Qty: 30 CAPSULE | Refills: 0 | Status: SHIPPED | OUTPATIENT
Start: 2025-07-29

## 2025-08-09 ENCOUNTER — TRANSFERRED RECORDS (OUTPATIENT)
Dept: HEALTH INFORMATION MANAGEMENT | Facility: CLINIC | Age: 55
End: 2025-08-09
Payer: COMMERCIAL

## 2025-08-13 ENCOUNTER — OFFICE VISIT (OUTPATIENT)
Dept: ORTHOPEDICS | Facility: CLINIC | Age: 55
End: 2025-08-13
Payer: COMMERCIAL

## 2025-08-13 ENCOUNTER — TELEPHONE (OUTPATIENT)
Dept: ORTHOPEDICS | Facility: CLINIC | Age: 55
End: 2025-08-13

## 2025-08-13 DIAGNOSIS — M17.12 PRIMARY OSTEOARTHRITIS OF LEFT KNEE: Primary | ICD-10-CM

## 2025-08-13 PROCEDURE — 99203 OFFICE O/P NEW LOW 30 MIN: CPT | Mod: 25 | Performed by: STUDENT IN AN ORGANIZED HEALTH CARE EDUCATION/TRAINING PROGRAM

## 2025-08-13 PROCEDURE — 20611 DRAIN/INJ JOINT/BURSA W/US: CPT | Mod: LT | Performed by: STUDENT IN AN ORGANIZED HEALTH CARE EDUCATION/TRAINING PROGRAM

## 2025-08-13 RX ORDER — BETAMETHASONE SODIUM PHOSPHATE AND BETAMETHASONE ACETATE 3; 3 MG/ML; MG/ML
6 INJECTION, SUSPENSION INTRA-ARTICULAR; INTRALESIONAL; INTRAMUSCULAR; SOFT TISSUE
Status: COMPLETED | OUTPATIENT
Start: 2025-08-13 | End: 2025-08-13

## 2025-08-13 RX ORDER — ROPIVACAINE HYDROCHLORIDE 5 MG/ML
2 INJECTION, SOLUTION EPIDURAL; INFILTRATION; PERINEURAL
Status: COMPLETED | OUTPATIENT
Start: 2025-08-13 | End: 2025-08-13

## 2025-08-13 RX ORDER — LIDOCAINE HYDROCHLORIDE 10 MG/ML
3 INJECTION, SOLUTION INFILTRATION; PERINEURAL
Status: COMPLETED | OUTPATIENT
Start: 2025-08-13 | End: 2025-08-13

## 2025-08-13 RX ORDER — LIDOCAINE HYDROCHLORIDE 10 MG/ML
2 INJECTION, SOLUTION INFILTRATION; PERINEURAL
Status: COMPLETED | OUTPATIENT
Start: 2025-08-13 | End: 2025-08-13

## 2025-08-13 RX ADMIN — LIDOCAINE HYDROCHLORIDE 2 ML: 10 INJECTION, SOLUTION INFILTRATION; PERINEURAL at 14:44

## 2025-08-13 RX ADMIN — BETAMETHASONE SODIUM PHOSPHATE AND BETAMETHASONE ACETATE 6 MG: 3; 3 INJECTION, SUSPENSION INTRA-ARTICULAR; INTRALESIONAL; INTRAMUSCULAR; SOFT TISSUE at 14:44

## 2025-08-13 RX ADMIN — LIDOCAINE HYDROCHLORIDE 3 ML: 10 INJECTION, SOLUTION INFILTRATION; PERINEURAL at 14:44

## 2025-08-13 RX ADMIN — ROPIVACAINE HYDROCHLORIDE 2 ML: 5 INJECTION, SOLUTION EPIDURAL; INFILTRATION; PERINEURAL at 14:44

## 2025-08-15 ENCOUNTER — THERAPY VISIT (OUTPATIENT)
Dept: PHYSICAL THERAPY | Facility: REHABILITATION | Age: 55
End: 2025-08-15
Attending: STUDENT IN AN ORGANIZED HEALTH CARE EDUCATION/TRAINING PROGRAM
Payer: COMMERCIAL

## 2025-08-15 DIAGNOSIS — M62.81 WEAKNESS OF LEFT QUADRICEPS MUSCLE: ICD-10-CM

## 2025-08-15 DIAGNOSIS — M25.562 ACUTE PAIN OF LEFT KNEE: ICD-10-CM

## 2025-08-15 DIAGNOSIS — M17.12 PRIMARY OSTEOARTHRITIS OF LEFT KNEE: Primary | ICD-10-CM

## 2025-08-15 PROCEDURE — 97161 PT EVAL LOW COMPLEX 20 MIN: CPT | Mod: GP

## 2025-08-15 PROCEDURE — 97110 THERAPEUTIC EXERCISES: CPT | Mod: GP

## 2025-08-15 ASSESSMENT — ACTIVITIES OF DAILY LIVING (ADL)
GIVING WAY, BUCKLING OR SHIFTING OF KNEE: THE SYMPTOM AFFECTS MY ACTIVITY MODERATELY
RISE FROM A CHAIR: ACTIVITY IS MINIMALLY DIFFICULT
SIT WITH YOUR KNEE BENT: ACTIVITY IS VERY DIFFICULT
LIMPING: THE SYMPTOM AFFECTS MY ACTIVITY MODERATELY
KNEE_ACTIVITY_OF_DAILY_LIVING_SCORE: 48.57
SWELLING: THE SYMPTOM AFFECTS MY ACTIVITY MODERATELY
WALK: ACTIVITY IS SOMEWHAT DIFFICULT
KNEEL ON THE FRONT OF YOUR KNEE: ACTIVITY IS FAIRLY DIFFICULT
GO UP STAIRS: ACTIVITY IS SOMEWHAT DIFFICULT
WALK: ACTIVITY IS SOMEWHAT DIFFICULT
GO UP STAIRS: ACTIVITY IS SOMEWHAT DIFFICULT
WEAKNESS: I DO NOT HAVE THE SYMPTOM
HOW_WOULD_YOU_RATE_THE_CURRENT_FUNCTION_OF_YOUR_KNEE_DURING_YOUR_USUAL_DAILY_ACTIVITIES_ON_A_SCALE_FROM_0_TO_100_WITH_100_BEING_YOUR_LEVEL_OF_KNEE_FUNCTION_PRIOR_TO_YOUR_INJURY_AND_0_BEING_THE_INABILITY_TO_PERFORM_ANY_OF_YOUR_USUAL_DAILY_ACTIVITIES?: 60
HOW_WOULD_YOU_RATE_THE_OVERALL_FUNCTION_OF_YOUR_KNEE_DURING_YOUR_USUAL_DAILY_ACTIVITIES?: ABNORMAL
AS_A_RESULT_OF_YOUR_KNEE_INJURY,_HOW_WOULD_YOU_RATE_YOUR_CURRENT_LEVEL_OF_DAILY_ACTIVITY?: ABNORMAL
STIFFNESS: THE SYMPTOM AFFECTS MY ACTIVITY MODERATELY
PAIN: THE SYMPTOM AFFECTS MY ACTIVITY MODERATELY
HOW_WOULD_YOU_RATE_THE_CURRENT_FUNCTION_OF_YOUR_KNEE_DURING_YOUR_USUAL_DAILY_ACTIVITIES_ON_A_SCALE_FROM_0_TO_100_WITH_100_BEING_YOUR_LEVEL_OF_KNEE_FUNCTION_PRIOR_TO_YOUR_INJURY_AND_0_BEING_THE_INABILITY_TO_PERFORM_ANY_OF_YOUR_USUAL_DAILY_ACTIVITIES?: 60
KNEE_ACTIVITY_OF_DAILY_LIVING_SUM: 34
LIMPING: THE SYMPTOM AFFECTS MY ACTIVITY MODERATELY
GO DOWN STAIRS: ACTIVITY IS SOMEWHAT DIFFICULT
KNEEL ON THE FRONT OF YOUR KNEE: ACTIVITY IS FAIRLY DIFFICULT
PLEASE_INDICATE_YOR_PRIMARY_REASON_FOR_REFERRAL_TO_THERAPY:: KNEE
PAIN: THE SYMPTOM AFFECTS MY ACTIVITY MODERATELY
SWELLING: THE SYMPTOM AFFECTS MY ACTIVITY MODERATELY
SQUAT: I AM UNABLE TO DO THE ACTIVITY
RAW_SCORE: 34
RISE FROM A CHAIR: ACTIVITY IS MINIMALLY DIFFICULT
AS_A_RESULT_OF_YOUR_KNEE_INJURY,_HOW_WOULD_YOU_RATE_YOUR_CURRENT_LEVEL_OF_DAILY_ACTIVITY?: ABNORMAL
SIT WITH YOUR KNEE BENT: ACTIVITY IS VERY DIFFICULT
GO DOWN STAIRS: ACTIVITY IS SOMEWHAT DIFFICULT
WEAKNESS: I DO NOT HAVE THE SYMPTOM
GIVING WAY, BUCKLING OR SHIFTING OF KNEE: THE SYMPTOM AFFECTS MY ACTIVITY MODERATELY
HOW_WOULD_YOU_RATE_THE_OVERALL_FUNCTION_OF_YOUR_KNEE_DURING_YOUR_USUAL_DAILY_ACTIVITIES?: ABNORMAL
STIFFNESS: THE SYMPTOM AFFECTS MY ACTIVITY MODERATELY
SQUAT: I AM UNABLE TO DO THE ACTIVITY
STAND: ACTIVITY IS SOMEWHAT DIFFICULT
STAND: ACTIVITY IS SOMEWHAT DIFFICULT

## 2025-08-18 PROBLEM — M17.12 PRIMARY OSTEOARTHRITIS OF LEFT KNEE: Status: ACTIVE | Noted: 2025-08-18

## 2025-08-18 PROBLEM — M62.81 WEAKNESS OF LEFT QUADRICEPS MUSCLE: Status: ACTIVE | Noted: 2025-08-18

## 2025-08-18 PROBLEM — M25.562 ACUTE PAIN OF LEFT KNEE: Status: ACTIVE | Noted: 2025-08-18

## (undated) DEVICE — BONE CLEANING TIP INTERPULSE  0210-010-000

## (undated) DEVICE — DRAPE SHEET REV FOLD 3/4 9349

## (undated) DEVICE — SUCTION IRR SYSTEM W/TIP INTERPULSE

## (undated) DEVICE — GLOVE PROTEXIS BLUE W/NEU-THERA 7.0  2D73EB70

## (undated) DEVICE — BLADE SAW OSCIL/SAG STRK 11X90X1.19MM 4/2000 4111-119-090

## (undated) DEVICE — ESU PENCIL SMOKE EVAC W/ROCKER SWITCH 0703-047-000

## (undated) DEVICE — GLOVE PROTEXIS W/NEU-THERA 8.0  2D73TE80

## (undated) DEVICE — GLOVE PROTEXIS MICRO 7.0  2D73PM70

## (undated) DEVICE — SU MONOCRYL 2-0 CT-1 36" UND Y945H

## (undated) DEVICE — SU MONOCRYL 3-0 PS-2 18" UND Y497G

## (undated) DEVICE — GOWN XLG DISP 9545

## (undated) DEVICE — GLOVE PROTEXIS BLUE W/NEU-THERA 6.5  2D73EB65

## (undated) DEVICE — BNDG COBAN 6"X5YDS STERILE

## (undated) DEVICE — DRSG AQUACEL AG 3.5X9.75" HYDROFIBER 412011

## (undated) DEVICE — SOL NACL 0.9% IRRIG 3000ML BAG 07972-08

## (undated) DEVICE — SU PDO 1 STRATAFIX 36X36CM CTX TAPERPOINT SXPD2B405

## (undated) DEVICE — SOL WATER IRRIG 1000ML BOTTLE 07139-09

## (undated) DEVICE — BLADE SAW SAGITTAL STRK 21X90X1.19MM HD SYS 6 6221-119-090

## (undated) DEVICE — PREP CHLORAPREP 26ML TINTED ORANGE  260815

## (undated) DEVICE — GLOVE PROTEXIS W/NEU-THERA 7.0  2D73TE70

## (undated) DEVICE — SU VICRYL 1 CT-1 36" UND J947H

## (undated) DEVICE — STOCKING SLEEVE COMPRESSION CALF MED

## (undated) DEVICE — GLOVE PROTEXIS W/NEU-THERA 6.5  2D73TE65

## (undated) DEVICE — GOWN IMPERVIOUS SPECIALTY XLG/XLONG 32474

## (undated) DEVICE — Device

## (undated) DEVICE — GOWN LG DISP 9515

## (undated) DEVICE — BONE CEMENT KIT BOWL AND SPATULA STRK 6201-3-410

## (undated) RX ORDER — GABAPENTIN 300 MG/1
CAPSULE ORAL
Status: DISPENSED
Start: 2021-05-06

## (undated) RX ORDER — ONDANSETRON 2 MG/ML
INJECTION INTRAMUSCULAR; INTRAVENOUS
Status: DISPENSED
Start: 2021-05-06

## (undated) RX ORDER — PROPOFOL 10 MG/ML
INJECTION, EMULSION INTRAVENOUS
Status: DISPENSED
Start: 2021-04-16

## (undated) RX ORDER — DEXAMETHASONE SODIUM PHOSPHATE 10 MG/ML
INJECTION, SOLUTION INTRAMUSCULAR; INTRAVENOUS
Status: DISPENSED
Start: 2021-05-06

## (undated) RX ORDER — TRANEXAMIC ACID 650 MG/1
TABLET ORAL
Status: DISPENSED
Start: 2021-05-06

## (undated) RX ORDER — ACETAMINOPHEN 325 MG/1
TABLET ORAL
Status: DISPENSED
Start: 2021-05-06

## (undated) RX ORDER — MAGNESIUM SULFATE HEPTAHYDRATE 40 MG/ML
INJECTION, SOLUTION INTRAVENOUS
Status: DISPENSED
Start: 2021-05-06

## (undated) RX ORDER — OXYCODONE HYDROCHLORIDE 5 MG/1
TABLET ORAL
Status: DISPENSED
Start: 2021-05-06

## (undated) RX ORDER — ROPIVACAINE HYDROCHLORIDE 5 MG/ML
INJECTION, SOLUTION EPIDURAL; INFILTRATION; PERINEURAL
Status: DISPENSED
Start: 2021-05-06

## (undated) RX ORDER — HYDROXYZINE HYDROCHLORIDE 50 MG/1
TABLET, FILM COATED ORAL
Status: DISPENSED
Start: 2021-05-06

## (undated) RX ORDER — PROPOFOL 10 MG/ML
INJECTION, EMULSION INTRAVENOUS
Status: DISPENSED
Start: 2021-05-06

## (undated) RX ORDER — FENTANYL CITRATE 50 UG/ML
INJECTION, SOLUTION INTRAMUSCULAR; INTRAVENOUS
Status: DISPENSED
Start: 2021-05-06

## (undated) RX ORDER — LIDOCAINE HCL/EPINEPHRINE/PF 2%-1:200K
VIAL (ML) INJECTION
Status: DISPENSED
Start: 2021-05-06